# Patient Record
Sex: FEMALE | Race: WHITE | NOT HISPANIC OR LATINO | URBAN - METROPOLITAN AREA
[De-identification: names, ages, dates, MRNs, and addresses within clinical notes are randomized per-mention and may not be internally consistent; named-entity substitution may affect disease eponyms.]

---

## 2018-08-21 NOTE — ASU PATIENT PROFILE, ADULT - PMH
Abnormal serum cholesterol    Acquired cataract    Generalized OA    H/O: HTN (hypertension)    Mildly obese

## 2018-08-22 ENCOUNTER — OUTPATIENT (OUTPATIENT)
Dept: OUTPATIENT SERVICES | Facility: HOSPITAL | Age: 83
LOS: 1 days | Discharge: HOME | End: 2018-08-22

## 2018-08-22 VITALS
HEIGHT: 64 IN | SYSTOLIC BLOOD PRESSURE: 130 MMHG | OXYGEN SATURATION: 96 % | WEIGHT: 207.9 LBS | HEART RATE: 77 BPM | DIASTOLIC BLOOD PRESSURE: 57 MMHG | RESPIRATION RATE: 17 BRPM | TEMPERATURE: 96 F

## 2018-08-22 VITALS
RESPIRATION RATE: 18 BRPM | SYSTOLIC BLOOD PRESSURE: 129 MMHG | DIASTOLIC BLOOD PRESSURE: 56 MMHG | OXYGEN SATURATION: 96 % | HEART RATE: 66 BPM

## 2018-08-22 RX ORDER — ACETAMINOPHEN 500 MG
325 TABLET ORAL ONCE
Qty: 0 | Refills: 0 | Status: DISCONTINUED | OUTPATIENT
Start: 2018-08-22 | End: 2018-09-06

## 2018-08-22 RX ORDER — ONDANSETRON 8 MG/1
4 TABLET, FILM COATED ORAL ONCE
Qty: 0 | Refills: 0 | Status: DISCONTINUED | OUTPATIENT
Start: 2018-08-22 | End: 2018-09-06

## 2018-08-22 NOTE — PRE-ANESTHESIA EVALUATION ADULT - NSANTHOSAYNRD_GEN_A_CORE
No. SUSIE screening performed.  STOP BANG Legend: 0-2 = LOW Risk; 3-4 = INTERMEDIATE Risk; 5-8 = HIGH Risk

## 2018-08-28 DIAGNOSIS — M19.90 UNSPECIFIED OSTEOARTHRITIS, UNSPECIFIED SITE: ICD-10-CM

## 2018-08-28 DIAGNOSIS — I10 ESSENTIAL (PRIMARY) HYPERTENSION: ICD-10-CM

## 2018-08-28 DIAGNOSIS — E66.9 OBESITY, UNSPECIFIED: ICD-10-CM

## 2018-08-28 DIAGNOSIS — H26.9 UNSPECIFIED CATARACT: ICD-10-CM

## 2018-08-28 DIAGNOSIS — Z90.49 ACQUIRED ABSENCE OF OTHER SPECIFIED PARTS OF DIGESTIVE TRACT: ICD-10-CM

## 2018-08-28 DIAGNOSIS — Z80.0 FAMILY HISTORY OF MALIGNANT NEOPLASM OF DIGESTIVE ORGANS: ICD-10-CM

## 2018-08-28 DIAGNOSIS — H25.12 AGE-RELATED NUCLEAR CATARACT, LEFT EYE: ICD-10-CM

## 2019-09-18 PROBLEM — H26.9 UNSPECIFIED CATARACT: Chronic | Status: ACTIVE | Noted: 2018-08-22

## 2019-09-18 PROBLEM — Z86.79 PERSONAL HISTORY OF OTHER DISEASES OF THE CIRCULATORY SYSTEM: Chronic | Status: ACTIVE | Noted: 2018-08-22

## 2019-09-18 PROBLEM — M15.9 POLYOSTEOARTHRITIS, UNSPECIFIED: Chronic | Status: ACTIVE | Noted: 2018-08-22

## 2019-09-18 PROBLEM — E78.9 DISORDER OF LIPOPROTEIN METABOLISM, UNSPECIFIED: Chronic | Status: ACTIVE | Noted: 2018-08-22

## 2019-09-18 PROBLEM — E66.9 OBESITY, UNSPECIFIED: Chronic | Status: ACTIVE | Noted: 2018-08-22

## 2019-09-24 ENCOUNTER — TRANSCRIPTION ENCOUNTER (OUTPATIENT)
Age: 84
End: 2019-09-24

## 2019-09-24 ENCOUNTER — RESULT REVIEW (OUTPATIENT)
Age: 84
End: 2019-09-24

## 2019-09-24 ENCOUNTER — OUTPATIENT (OUTPATIENT)
Dept: OUTPATIENT SERVICES | Facility: HOSPITAL | Age: 84
LOS: 1 days | Discharge: HOME | End: 2019-09-24
Payer: MEDICARE

## 2019-09-24 VITALS — RESPIRATION RATE: 18 BRPM | SYSTOLIC BLOOD PRESSURE: 119 MMHG | HEART RATE: 70 BPM | DIASTOLIC BLOOD PRESSURE: 76 MMHG

## 2019-09-24 VITALS
HEART RATE: 80 BPM | WEIGHT: 164.91 LBS | RESPIRATION RATE: 18 BRPM | HEIGHT: 64 IN | SYSTOLIC BLOOD PRESSURE: 137 MMHG | TEMPERATURE: 98 F | DIASTOLIC BLOOD PRESSURE: 87 MMHG

## 2019-09-24 DIAGNOSIS — Z98.890 OTHER SPECIFIED POSTPROCEDURAL STATES: Chronic | ICD-10-CM

## 2019-09-24 DIAGNOSIS — Z90.49 ACQUIRED ABSENCE OF OTHER SPECIFIED PARTS OF DIGESTIVE TRACT: Chronic | ICD-10-CM

## 2019-09-24 DIAGNOSIS — Z98.49 CATARACT EXTRACTION STATUS, UNSPECIFIED EYE: Chronic | ICD-10-CM

## 2019-09-24 PROCEDURE — 88307 TISSUE EXAM BY PATHOLOGIST: CPT | Mod: 26

## 2019-09-24 PROCEDURE — 88173 CYTOPATH EVAL FNA REPORT: CPT | Mod: 26

## 2019-09-24 RX ORDER — DILTIAZEM HCL 120 MG
1 CAPSULE, EXT RELEASE 24 HR ORAL
Qty: 0 | Refills: 0 | DISCHARGE

## 2019-09-24 RX ORDER — ATORVASTATIN CALCIUM 80 MG/1
1 TABLET, FILM COATED ORAL
Qty: 0 | Refills: 0 | DISCHARGE

## 2019-09-24 RX ORDER — RIVAROXABAN 15 MG-20MG
1 KIT ORAL
Qty: 0 | Refills: 0 | DISCHARGE

## 2019-09-24 NOTE — ASU DISCHARGE PLAN (ADULT/PEDIATRIC) - CALL YOUR DOCTOR IF YOU HAVE ANY OF THE FOLLOWING:
Nausea and vomiting that does not stop/Fever greater than (need to indicate Fahrenheit or Celsius)/Inability to tolerate liquids or foods/Bleeding that does not stop

## 2019-09-24 NOTE — ASU PATIENT PROFILE, ADULT - PMH
Abnormal serum cholesterol    Acquired cataract    Afib    DM (diabetes mellitus)    Generalized OA    H/O: HTN (hypertension)    Mildly obese

## 2019-09-24 NOTE — ASU PATIENT PROFILE, ADULT - PSH
H/O hernia repair  abdomen  History of cataract surgery    History of cholecystectomy    History of colon resection

## 2019-09-25 LAB
NON-GYNECOLOGICAL CYTOLOGY STUDY: SIGNIFICANT CHANGE UP
NON-GYNECOLOGICAL CYTOLOGY STUDY: SIGNIFICANT CHANGE UP

## 2019-09-27 LAB — SURGICAL PATHOLOGY STUDY: SIGNIFICANT CHANGE UP

## 2019-09-30 DIAGNOSIS — K86.9 DISEASE OF PANCREAS, UNSPECIFIED: ICD-10-CM

## 2019-10-08 ENCOUNTER — OUTPATIENT (OUTPATIENT)
Dept: OUTPATIENT SERVICES | Facility: HOSPITAL | Age: 84
LOS: 1 days | Discharge: HOME | End: 2019-10-08
Payer: MEDICARE

## 2019-10-08 ENCOUNTER — TRANSCRIPTION ENCOUNTER (OUTPATIENT)
Age: 84
End: 2019-10-08

## 2019-10-08 ENCOUNTER — RESULT REVIEW (OUTPATIENT)
Age: 84
End: 2019-10-08

## 2019-10-08 VITALS
TEMPERATURE: 96 F | RESPIRATION RATE: 18 BRPM | WEIGHT: 160.06 LBS | HEART RATE: 63 BPM | DIASTOLIC BLOOD PRESSURE: 59 MMHG | SYSTOLIC BLOOD PRESSURE: 110 MMHG | HEIGHT: 63 IN

## 2019-10-08 VITALS — HEART RATE: 64 BPM | SYSTOLIC BLOOD PRESSURE: 112 MMHG | RESPIRATION RATE: 18 BRPM | DIASTOLIC BLOOD PRESSURE: 69 MMHG

## 2019-10-08 DIAGNOSIS — Z98.49 CATARACT EXTRACTION STATUS, UNSPECIFIED EYE: Chronic | ICD-10-CM

## 2019-10-08 DIAGNOSIS — Z90.89 ACQUIRED ABSENCE OF OTHER ORGANS: Chronic | ICD-10-CM

## 2019-10-08 DIAGNOSIS — Z98.890 OTHER SPECIFIED POSTPROCEDURAL STATES: Chronic | ICD-10-CM

## 2019-10-08 DIAGNOSIS — Z90.49 ACQUIRED ABSENCE OF OTHER SPECIFIED PARTS OF DIGESTIVE TRACT: Chronic | ICD-10-CM

## 2019-10-08 PROBLEM — E11.9 TYPE 2 DIABETES MELLITUS WITHOUT COMPLICATIONS: Chronic | Status: ACTIVE | Noted: 2019-09-24

## 2019-10-08 PROBLEM — I48.91 UNSPECIFIED ATRIAL FIBRILLATION: Chronic | Status: ACTIVE | Noted: 2019-09-24

## 2019-10-08 LAB — GLUCOSE BLDC GLUCOMTR-MCNC: 140 MG/DL — HIGH (ref 70–99)

## 2019-10-08 PROCEDURE — 88305 TISSUE EXAM BY PATHOLOGIST: CPT | Mod: 26

## 2019-10-08 NOTE — ASU DISCHARGE PLAN (ADULT/PEDIATRIC) - CALL YOUR DOCTOR IF YOU HAVE ANY OF THE FOLLOWING:
Fever greater than (need to indicate Fahrenheit or Celsius)/Unable to urinate/Inability to tolerate liquids or foods/Wound/Surgical Site with redness, or foul smelling discharge or pus/Numbness, tingling, color or temperature change to extremity/Excessive diarrhea/Nausea and vomiting that does not stop/Pain not relieved by Medications/Increased irritability or sluggishness/Bleeding that does not stop

## 2019-10-08 NOTE — ASU PATIENT PROFILE, ADULT - PMH
Abnormal serum cholesterol    Acquired cataract    Afib    Anxiety    DM (diabetes mellitus)    Generalized OA    H/O: HTN (hypertension)    Hypercholesteremia    Mildly obese

## 2019-10-08 NOTE — ASU PATIENT PROFILE, ADULT - PSH
H/O hernia repair  abdomen  History of cataract surgery    History of cholecystectomy    History of colon resection    History of tonsillectomy

## 2019-10-11 LAB — SURGICAL PATHOLOGY STUDY: SIGNIFICANT CHANGE UP

## 2019-10-15 DIAGNOSIS — I48.91 UNSPECIFIED ATRIAL FIBRILLATION: ICD-10-CM

## 2019-10-15 DIAGNOSIS — E66.9 OBESITY, UNSPECIFIED: ICD-10-CM

## 2019-10-15 DIAGNOSIS — E11.9 TYPE 2 DIABETES MELLITUS WITHOUT COMPLICATIONS: ICD-10-CM

## 2019-10-15 DIAGNOSIS — E78.00 PURE HYPERCHOLESTEROLEMIA, UNSPECIFIED: ICD-10-CM

## 2019-10-15 DIAGNOSIS — Z79.84 LONG TERM (CURRENT) USE OF ORAL HYPOGLYCEMIC DRUGS: ICD-10-CM

## 2019-10-15 DIAGNOSIS — K86.2 CYST OF PANCREAS: ICD-10-CM

## 2019-10-15 DIAGNOSIS — K29.50 UNSPECIFIED CHRONIC GASTRITIS WITHOUT BLEEDING: ICD-10-CM

## 2019-10-17 PROBLEM — E78.00 PURE HYPERCHOLESTEROLEMIA, UNSPECIFIED: Chronic | Status: ACTIVE | Noted: 2019-10-08

## 2019-10-17 PROBLEM — Z00.00 ENCOUNTER FOR PREVENTIVE HEALTH EXAMINATION: Status: ACTIVE | Noted: 2019-10-17

## 2019-10-17 PROBLEM — F41.9 ANXIETY DISORDER, UNSPECIFIED: Chronic | Status: ACTIVE | Noted: 2019-10-08

## 2019-10-20 NOTE — PHYSICAL EXAM
[Normal] : supple, no neck mass and thyroid not enlarged [Normal Neck Lymph Nodes] : normal neck lymph nodes  [Normal Supraclavicular Lymph Nodes] : normal supraclavicular lymph nodes [Normal] : grossly intact

## 2019-10-21 ENCOUNTER — APPOINTMENT (OUTPATIENT)
Dept: SURGERY | Facility: CLINIC | Age: 84
End: 2019-10-21
Payer: MEDICARE

## 2019-10-21 DIAGNOSIS — Z86.59 PERSONAL HISTORY OF OTHER MENTAL AND BEHAVIORAL DISORDERS: ICD-10-CM

## 2019-10-21 DIAGNOSIS — K86.89 OTHER SPECIFIED DISEASES OF PANCREAS: ICD-10-CM

## 2019-10-21 PROCEDURE — 99204 OFFICE O/P NEW MOD 45 MIN: CPT

## 2019-10-22 ENCOUNTER — OUTPATIENT (OUTPATIENT)
Dept: OUTPATIENT SERVICES | Facility: HOSPITAL | Age: 84
LOS: 1 days | Discharge: HOME | End: 2019-10-22
Payer: MEDICARE

## 2019-10-22 DIAGNOSIS — Z90.49 ACQUIRED ABSENCE OF OTHER SPECIFIED PARTS OF DIGESTIVE TRACT: Chronic | ICD-10-CM

## 2019-10-22 DIAGNOSIS — Z98.49 CATARACT EXTRACTION STATUS, UNSPECIFIED EYE: Chronic | ICD-10-CM

## 2019-10-22 DIAGNOSIS — Z98.890 OTHER SPECIFIED POSTPROCEDURAL STATES: Chronic | ICD-10-CM

## 2019-10-22 DIAGNOSIS — K86.89 OTHER SPECIFIED DISEASES OF PANCREAS: ICD-10-CM

## 2019-10-22 DIAGNOSIS — Z90.89 ACQUIRED ABSENCE OF OTHER ORGANS: Chronic | ICD-10-CM

## 2019-10-22 LAB — GLUCOSE BLDC GLUCOMTR-MCNC: 108 MG/DL — HIGH (ref 70–99)

## 2019-10-22 PROCEDURE — 78815 PET IMAGE W/CT SKULL-THIGH: CPT | Mod: 26,PI

## 2019-10-25 ENCOUNTER — OTHER (OUTPATIENT)
Age: 84
End: 2019-10-25

## 2019-10-30 ENCOUNTER — APPOINTMENT (OUTPATIENT)
Dept: GASTROENTEROLOGY | Facility: CLINIC | Age: 84
End: 2019-10-30
Payer: MEDICARE

## 2019-10-30 VITALS
HEART RATE: 90 BPM | WEIGHT: 157 LBS | HEIGHT: 63 IN | SYSTOLIC BLOOD PRESSURE: 129 MMHG | BODY MASS INDEX: 27.82 KG/M2 | DIASTOLIC BLOOD PRESSURE: 80 MMHG

## 2019-10-30 DIAGNOSIS — Z86.39 PERSONAL HISTORY OF OTHER ENDOCRINE, NUTRITIONAL AND METABOLIC DISEASE: ICD-10-CM

## 2019-10-30 DIAGNOSIS — Z86.79 PERSONAL HISTORY OF OTHER DISEASES OF THE CIRCULATORY SYSTEM: ICD-10-CM

## 2019-10-30 DIAGNOSIS — Z87.09 PERSONAL HISTORY OF OTHER DISEASES OF THE RESPIRATORY SYSTEM: ICD-10-CM

## 2019-10-30 DIAGNOSIS — Z85.038 PERSONAL HISTORY OF OTHER MALIGNANT NEOPLASM OF LARGE INTESTINE: ICD-10-CM

## 2019-10-30 DIAGNOSIS — M19.90 UNSPECIFIED OSTEOARTHRITIS, UNSPECIFIED SITE: ICD-10-CM

## 2019-10-30 DIAGNOSIS — Z86.69 PERSONAL HISTORY OF OTHER DISEASES OF THE NERVOUS SYSTEM AND SENSE ORGANS: ICD-10-CM

## 2019-10-30 DIAGNOSIS — R93.5 ABNORMAL FINDINGS ON DIAGNOSTIC IMAGING OF OTHER ABDOMINAL REGIONS, INCLUDING RETROPERITONEUM: ICD-10-CM

## 2019-10-30 DIAGNOSIS — Z86.718 PERSONAL HISTORY OF OTHER VENOUS THROMBOSIS AND EMBOLISM: ICD-10-CM

## 2019-10-30 PROCEDURE — 99204 OFFICE O/P NEW MOD 45 MIN: CPT

## 2019-10-30 RX ORDER — LATANOPROST/PF 0.005 %
DROPS OPHTHALMIC (EYE)
Refills: 0 | Status: ACTIVE | COMMUNITY

## 2019-10-30 RX ORDER — CHOLECALCIFEROL (VITAMIN D3) 25 MCG
TABLET ORAL
Refills: 0 | Status: ACTIVE | COMMUNITY

## 2019-10-30 RX ORDER — MULTIVIT-MIN/FA/LYCOPEN/LUTEIN .4-300-25
TABLET ORAL
Refills: 0 | Status: ACTIVE | COMMUNITY

## 2019-10-30 RX ORDER — HYDROCHLOROTHIAZIDE 12.5 MG/1
12.5 TABLET ORAL
Refills: 0 | Status: ACTIVE | COMMUNITY

## 2019-10-30 RX ORDER — FLUOXETINE HYDROCHLORIDE 20 MG/1
20 TABLET ORAL
Refills: 0 | Status: ACTIVE | COMMUNITY

## 2019-10-30 RX ORDER — ATORVASTATIN CALCIUM 20 MG/1
20 TABLET, FILM COATED ORAL
Refills: 0 | Status: ACTIVE | COMMUNITY

## 2019-10-30 RX ORDER — ACETAMINOPHEN 325 MG/1
TABLET, FILM COATED ORAL
Refills: 0 | Status: ACTIVE | COMMUNITY

## 2019-10-30 RX ORDER — METFORMIN HYDROCHLORIDE 500 MG/1
500 TABLET, COATED ORAL
Refills: 0 | Status: ACTIVE | COMMUNITY

## 2019-10-30 RX ORDER — ASPIRIN 81 MG
81 TABLET, DELAYED RELEASE (ENTERIC COATED) ORAL
Refills: 0 | Status: ACTIVE | COMMUNITY

## 2019-10-30 RX ORDER — DORZOLAMIDE HYDROCHLORIDE 20 MG/ML
2 SOLUTION OPHTHALMIC
Refills: 0 | Status: ACTIVE | COMMUNITY

## 2019-10-30 RX ORDER — FOSINOPRIL SODIUM 40 MG/1
40 TABLET ORAL
Refills: 0 | Status: ACTIVE | COMMUNITY

## 2019-10-30 RX ORDER — REPAGLINIDE 0.5 MG/1
0.5 TABLET ORAL
Refills: 0 | Status: ACTIVE | COMMUNITY

## 2019-10-30 RX ORDER — APIXABAN 5 MG/1
5 TABLET, FILM COATED ORAL
Refills: 0 | Status: ACTIVE | COMMUNITY

## 2019-10-30 NOTE — PHYSICAL EXAM
[General Appearance - Alert] : alert [General Appearance - In No Acute Distress] : in no acute distress [Sclera] : the sclera and conjunctiva were normal [PERRL With Normal Accommodation] : pupils were equal in size, round, and reactive to light [Extraocular Movements] : extraocular movements were intact [Outer Ear] : the ears and nose were normal in appearance [Oropharynx] : the oropharynx was normal [Neck Appearance] : the appearance of the neck was normal [Neck Cervical Mass (___cm)] : no neck mass was observed [Jugular Venous Distention Increased] : there was no jugular-venous distention [Thyroid Diffuse Enlargement] : the thyroid was not enlarged [Thyroid Nodule] : there were no palpable thyroid nodules [Auscultation Breath Sounds / Voice Sounds] : lungs were clear to auscultation bilaterally [Heart Rate And Rhythm] : heart rate was normal and rhythm regular [Heart Sounds] : normal S1 and S2 [Heart Sounds Gallop] : no gallops [Murmurs] : no murmurs [Heart Sounds Pericardial Friction Rub] : no pericardial rub [Bowel Sounds] : normal bowel sounds [Abdomen Soft] : soft [Abdomen Tenderness] : non-tender [Abdomen Mass (___ Cm)] : no abdominal mass palpated [Involuntary Movements] : no involuntary movements were seen [] : no rash [No Focal Deficits] : no focal deficits [Affect] : the affect was normal [Mood] : the mood was normal

## 2019-10-31 NOTE — ASSESSMENT
[FreeTextEntry1] : Patient is an 84-year-old female with past medical history of hypertension, atrial fibrillation, prior blood clots, referred from surgical oncology for evaluation of a pancreatic head mass.  Patient underwent to recent endoscopic ultrasounds one with FNA and the other with FNB with no yield for pathology.  Patient requires tissue sampling in order to establish care regimen.  She without any signs of obstruction such as nausea, vomiting, abdominal pain, jaundice, or upper GI symptoms.  Patient present with son whom was also present during last endoscopic ultrasound as well as evaluation by surgical oncology.  Concern exists as procedure is being requested quite rapidly.  Patient is actively on anticoagulation as well as antiplatelet agent.\par \par Prior EUS with FNA and FNB x 2 - no yield \par - Hold Eliquis one week\par - Hold ASA one week \par - Will arrange for EUS with sampling \par - Risk and benefit of procedure explained to patient

## 2019-11-02 PROBLEM — Z86.59 HISTORY OF DEPRESSION: Status: RESOLVED | Noted: 2019-11-02 | Resolved: 2019-11-02

## 2019-11-02 NOTE — HISTORY OF PRESENT ILLNESS
[de-identified] : 83 yo female patient with multiple medical problems, underwent EUS / FNA of a 4 x 5 cm mass in the head of the pancreas with benign results. She comes today for evaluation and treatment recommendations.\par \par Imaging with a large mass in he head of the pancreas encasing the PV. Multiple biopsies negative. PET positive lesion. Denies any other symptoms.

## 2019-11-02 NOTE — ASSESSMENT
[FreeTextEntry1] : 85 yo female patient with multiple medical problems, underwent EUS / FNA of a 4 x 5 cm mass in the head of the pancreas with benign results. She comes today for evaluation and treatment recommendations.\par \par Imaging with a large mass in he head of the pancreas encasing the PV. Multiple biopsies negative. PET positive lesion. Denies any other symptoms.\par \par Assessment and Plan:\par \par Pancreas mass, head, biopsies by EUS negative. encasing PV.\par Referred to Dr. Yeboah for EUS and FNA.\par D/w case with Dr. Vela.\par D/w Daughter.\par \par No surgical intervention offered.\par \par All the questions were answered to their satisfaction and I encouraged the patient to call my office at anytime if they had any questions. Plan of care fully discussed with the patient.

## 2019-11-02 NOTE — REASON FOR VISIT
[Initial Consultation] : an initial consultation for [Family Member] : family member [FreeTextEntry2] : pancreas mass, head.

## 2019-12-18 ENCOUNTER — INPATIENT (INPATIENT)
Facility: HOSPITAL | Age: 84
LOS: 8 days | Discharge: NURSING, REHAB OR LONG-TERM CARE | End: 2019-12-27
Attending: INTERNAL MEDICINE | Admitting: INTERNAL MEDICINE
Payer: MEDICARE

## 2019-12-18 VITALS
HEART RATE: 117 BPM | DIASTOLIC BLOOD PRESSURE: 92 MMHG | RESPIRATION RATE: 18 BRPM | TEMPERATURE: 98 F | OXYGEN SATURATION: 99 % | SYSTOLIC BLOOD PRESSURE: 127 MMHG

## 2019-12-18 DIAGNOSIS — Z98.890 OTHER SPECIFIED POSTPROCEDURAL STATES: Chronic | ICD-10-CM

## 2019-12-18 DIAGNOSIS — Z90.49 ACQUIRED ABSENCE OF OTHER SPECIFIED PARTS OF DIGESTIVE TRACT: Chronic | ICD-10-CM

## 2019-12-18 DIAGNOSIS — Z98.49 CATARACT EXTRACTION STATUS, UNSPECIFIED EYE: Chronic | ICD-10-CM

## 2019-12-18 DIAGNOSIS — Z90.89 ACQUIRED ABSENCE OF OTHER ORGANS: Chronic | ICD-10-CM

## 2019-12-18 LAB
ALBUMIN SERPL ELPH-MCNC: 3.2 G/DL — LOW (ref 3.5–5.2)
ALP SERPL-CCNC: 1416 U/L — HIGH (ref 30–115)
ALT FLD-CCNC: 213 U/L — HIGH (ref 0–41)
ANION GAP SERPL CALC-SCNC: 14 MMOL/L — SIGNIFICANT CHANGE UP (ref 7–14)
APTT BLD: 25.6 SEC — LOW (ref 27–39.2)
AST SERPL-CCNC: 500 U/L — HIGH (ref 0–41)
BASOPHILS # BLD AUTO: 0 K/UL — SIGNIFICANT CHANGE UP (ref 0–0.2)
BASOPHILS NFR BLD AUTO: 0 % — SIGNIFICANT CHANGE UP (ref 0–1)
BILIRUB DIRECT SERPL-MCNC: 5.4 MG/DL — HIGH (ref 0–0.2)
BILIRUB INDIRECT FLD-MCNC: 0.5 MG/DL — SIGNIFICANT CHANGE UP (ref 0.2–1.2)
BILIRUB SERPL-MCNC: 5.9 MG/DL — HIGH (ref 0.2–1.2)
BUN SERPL-MCNC: 15 MG/DL — SIGNIFICANT CHANGE UP (ref 10–20)
CALCIUM SERPL-MCNC: 9 MG/DL — SIGNIFICANT CHANGE UP (ref 8.5–10.1)
CHLORIDE SERPL-SCNC: 97 MMOL/L — LOW (ref 98–110)
CO2 SERPL-SCNC: 25 MMOL/L — SIGNIFICANT CHANGE UP (ref 17–32)
CREAT SERPL-MCNC: <0.5 MG/DL — LOW (ref 0.7–1.5)
EOSINOPHIL # BLD AUTO: 0 K/UL — SIGNIFICANT CHANGE UP (ref 0–0.7)
EOSINOPHIL NFR BLD AUTO: 0 % — SIGNIFICANT CHANGE UP (ref 0–8)
GLUCOSE SERPL-MCNC: 192 MG/DL — HIGH (ref 70–99)
HCT VFR BLD CALC: 36.7 % — LOW (ref 37–47)
HGB BLD-MCNC: 12.9 G/DL — SIGNIFICANT CHANGE UP (ref 12–16)
IMM GRANULOCYTES NFR BLD AUTO: 0.4 % — HIGH (ref 0.1–0.3)
INR BLD: 1.27 RATIO — SIGNIFICANT CHANGE UP (ref 0.65–1.3)
LACTATE SERPL-SCNC: 1.5 MMOL/L — SIGNIFICANT CHANGE UP (ref 0.7–2)
LIDOCAIN IGE QN: 6 U/L — LOW (ref 7–60)
LYMPHOCYTES # BLD AUTO: 0.68 K/UL — LOW (ref 1.2–3.4)
LYMPHOCYTES # BLD AUTO: 24.1 % — SIGNIFICANT CHANGE UP (ref 20.5–51.1)
MAGNESIUM SERPL-MCNC: 1.8 MG/DL — SIGNIFICANT CHANGE UP (ref 1.8–2.4)
MCHC RBC-ENTMCNC: 33.2 PG — HIGH (ref 27–31)
MCHC RBC-ENTMCNC: 35.1 G/DL — SIGNIFICANT CHANGE UP (ref 32–37)
MCV RBC AUTO: 94.3 FL — SIGNIFICANT CHANGE UP (ref 81–99)
MONOCYTES # BLD AUTO: 0.04 K/UL — LOW (ref 0.1–0.6)
MONOCYTES NFR BLD AUTO: 1.4 % — LOW (ref 1.7–9.3)
NEUTROPHILS # BLD AUTO: 2.09 K/UL — SIGNIFICANT CHANGE UP (ref 1.4–6.5)
NEUTROPHILS NFR BLD AUTO: 74.1 % — SIGNIFICANT CHANGE UP (ref 42.2–75.2)
NRBC # BLD: 0 /100 WBCS — SIGNIFICANT CHANGE UP (ref 0–0)
PLATELET # BLD AUTO: 221 K/UL — SIGNIFICANT CHANGE UP (ref 130–400)
POTASSIUM SERPL-MCNC: 4 MMOL/L — SIGNIFICANT CHANGE UP (ref 3.5–5)
POTASSIUM SERPL-SCNC: 4 MMOL/L — SIGNIFICANT CHANGE UP (ref 3.5–5)
PROT SERPL-MCNC: 5.7 G/DL — LOW (ref 6–8)
PROTHROM AB SERPL-ACNC: 14.6 SEC — HIGH (ref 9.95–12.87)
RBC # BLD: 3.89 M/UL — LOW (ref 4.2–5.4)
RBC # FLD: 15 % — HIGH (ref 11.5–14.5)
SODIUM SERPL-SCNC: 136 MMOL/L — SIGNIFICANT CHANGE UP (ref 135–146)
TROPONIN T SERPL-MCNC: <0.01 NG/ML — SIGNIFICANT CHANGE UP
WBC # BLD: 2.82 K/UL — LOW (ref 4.8–10.8)
WBC # FLD AUTO: 2.82 K/UL — LOW (ref 4.8–10.8)

## 2019-12-18 PROCEDURE — 93010 ELECTROCARDIOGRAM REPORT: CPT

## 2019-12-18 PROCEDURE — 99285 EMERGENCY DEPT VISIT HI MDM: CPT

## 2019-12-18 PROCEDURE — 71045 X-RAY EXAM CHEST 1 VIEW: CPT | Mod: 26

## 2019-12-18 PROCEDURE — 74177 CT ABD & PELVIS W/CONTRAST: CPT | Mod: 26

## 2019-12-18 RX ORDER — SODIUM CHLORIDE 9 MG/ML
1000 INJECTION INTRAMUSCULAR; INTRAVENOUS; SUBCUTANEOUS ONCE
Refills: 0 | Status: COMPLETED | OUTPATIENT
Start: 2019-12-18 | End: 2019-12-18

## 2019-12-18 RX ORDER — HYDROMORPHONE HYDROCHLORIDE 2 MG/ML
0.5 INJECTION INTRAMUSCULAR; INTRAVENOUS; SUBCUTANEOUS ONCE
Refills: 0 | Status: DISCONTINUED | OUTPATIENT
Start: 2019-12-18 | End: 2019-12-18

## 2019-12-18 RX ORDER — DILTIAZEM HCL 120 MG
60 CAPSULE, EXT RELEASE 24 HR ORAL ONCE
Refills: 0 | Status: COMPLETED | OUTPATIENT
Start: 2019-12-18 | End: 2019-12-18

## 2019-12-18 RX ORDER — MORPHINE SULFATE 50 MG/1
4 CAPSULE, EXTENDED RELEASE ORAL ONCE
Refills: 0 | Status: DISCONTINUED | OUTPATIENT
Start: 2019-12-18 | End: 2019-12-18

## 2019-12-18 RX ORDER — IOHEXOL 300 MG/ML
30 INJECTION, SOLUTION INTRAVENOUS ONCE
Refills: 0 | Status: COMPLETED | OUTPATIENT
Start: 2019-12-18 | End: 2019-12-18

## 2019-12-18 RX ORDER — PIPERACILLIN AND TAZOBACTAM 4; .5 G/20ML; G/20ML
3.38 INJECTION, POWDER, LYOPHILIZED, FOR SOLUTION INTRAVENOUS ONCE
Refills: 0 | Status: COMPLETED | OUTPATIENT
Start: 2019-12-18 | End: 2019-12-18

## 2019-12-18 RX ORDER — DILTIAZEM HCL 120 MG
10 CAPSULE, EXT RELEASE 24 HR ORAL ONCE
Refills: 0 | Status: COMPLETED | OUTPATIENT
Start: 2019-12-18 | End: 2019-12-18

## 2019-12-18 RX ORDER — ONDANSETRON 8 MG/1
4 TABLET, FILM COATED ORAL ONCE
Refills: 0 | Status: COMPLETED | OUTPATIENT
Start: 2019-12-18 | End: 2019-12-18

## 2019-12-18 RX ADMIN — HYDROMORPHONE HYDROCHLORIDE 0.5 MILLIGRAM(S): 2 INJECTION INTRAMUSCULAR; INTRAVENOUS; SUBCUTANEOUS at 21:36

## 2019-12-18 RX ADMIN — IOHEXOL 30 MILLILITER(S): 300 INJECTION, SOLUTION INTRAVENOUS at 16:00

## 2019-12-18 RX ADMIN — Medication 10 MILLIGRAM(S): at 21:37

## 2019-12-18 RX ADMIN — ONDANSETRON 4 MILLIGRAM(S): 8 TABLET, FILM COATED ORAL at 16:00

## 2019-12-18 RX ADMIN — PIPERACILLIN AND TAZOBACTAM 200 GRAM(S): 4; .5 INJECTION, POWDER, LYOPHILIZED, FOR SOLUTION INTRAVENOUS at 21:36

## 2019-12-18 RX ADMIN — Medication 60 MILLIGRAM(S): at 23:49

## 2019-12-18 RX ADMIN — MORPHINE SULFATE 4 MILLIGRAM(S): 50 CAPSULE, EXTENDED RELEASE ORAL at 16:00

## 2019-12-18 RX ADMIN — SODIUM CHLORIDE 1000 MILLILITER(S): 9 INJECTION INTRAMUSCULAR; INTRAVENOUS; SUBCUTANEOUS at 16:00

## 2019-12-18 NOTE — ED ADULT TRIAGE NOTE - CHIEF COMPLAINT QUOTE
Pt c/o abd pain. Pt diagnosed with pancreatic ca and started chemo on 12/12. Pt sent in by onc for abd pain and elevated liver enzymes.

## 2019-12-18 NOTE — ED PROVIDER NOTE - PHYSICAL EXAMINATION
CONST: Well appearing in NAD  EYES: Sclera and conjunctiva clear.  CARD: + tachycardic, irregularly irregular.   RESP: Equal BS B/L, No wheezes, rhonchi or rales. No distress  GI: Soft, + diffuse abd pain, no rebound or guarding.  non-distended.  MS: Normal ROM in all extremities. No edema of lower extremities, no calf pain, radial pulses 2+ bilaterally  SKIN: Warm, dry, no acute rashes. Good turgor  NEURO: A&Ox3, No focal deficits. Strength 5/5 with no sensory deficits. Steady gait

## 2019-12-18 NOTE — ED ADULT NURSE NOTE - OBJECTIVE STATEMENT
went to pmd for f/u blood work has pancreas cancer, states has abdomen pain, daughter states blood work called and said liver enzymes elevated to come to hospital for stent. pt denies v/f/d/chills. states decrease in po intake appetite

## 2019-12-18 NOTE — ED PROVIDER NOTE - CARE PLAN
Principal Discharge DX:	Transaminitis  Secondary Diagnosis:	Intractable abdominal pain  Secondary Diagnosis:	Atrial fibrillation with RVR Principal Discharge DX:	Transaminitis  Secondary Diagnosis:	Intractable abdominal pain  Secondary Diagnosis:	Atrial fibrillation with RVR  Secondary Diagnosis:	Pancreatic cancer

## 2019-12-18 NOTE — ED ADULT NURSE NOTE - PMH
Abnormal serum cholesterol    Acquired cataract    Afib    Anxiety    DM (diabetes mellitus)    Generalized OA    H/O: HTN (hypertension)    Hypercholesteremia    Mildly obese    Pancreatic cancer

## 2019-12-18 NOTE — ED PROVIDER NOTE - OBJECTIVE STATEMENT
84 y.o female w/ hx of MDD, colon CA s/p resection, pancreatic mass presents to the ED for evaluation of x 3 days.  States she was recently dx with pancreatic ca and started on chemo with last tx On Friday.  Over past 3 days worsening diffuse abd pain, moderate severity, exacerbated with PO intake, no alleviating factors, no radiation of pain.  Admits to nausea and NBNB emesis.  Denies diarrhea, urinary sxs, chest pain, dyspnea, fever, vaginal d/c or bleeding.  had blood work which showed elevated LFTs prompting visit to the ED.

## 2019-12-18 NOTE — ED PROVIDER NOTE - PROGRESS NOTE DETAILS
discussed case with BETY Dubon.  recommends NPO, zosyn, blood cx, IVF.  will see in the morning. On monitor, afib 120's-140's.  pt has h/o afib, is on cardizem.  will give iv dose. repeat 's after cardizem.

## 2019-12-18 NOTE — ED PROVIDER NOTE - ATTENDING CONTRIBUTION TO CARE
85 y/o female with /o HTN, DM, afib on eliquis, recently diagnosed with pancreatic CA, received 1st chemo 5 days ago, sent to ER by her onc, Dr. Vela for increased LFT's, possible biliary obstruction, possible need for stent placement.  Pt states she's been having abd pain for > 1 month 83 y/o female with /o HTN, DM, afib on eliquis, recently diagnosed with pancreatic CA, received 1st chemo 5 days ago, sent to ER by her onc, Dr. Vela for increased LFT's, possible biliary obstruction, possible need for stent placement.  Pt states she's been having abd pain for > 1 month, getting worse.  +N, vomited yesterday.  no diarrhea.  + generalized weakness.  + decreased PO intake. no f/c.  no cp/sob.  no ha/dizziness/loc. had h/o appt with onc today and labs showed increased LFT's.  PE - nad, nc, eomi, perrl, op - clear, mmm, cta b/l, no w/r/r, irreg rhythm, 's, abd - soft, + diffuse tenderness, no guarding/rebound, nabs, from x 4, A&O x 3, no focal neuro deficits.  -check labs, ct abd, admit.

## 2019-12-18 NOTE — ED PROVIDER NOTE - NS ED ROS FT
Constitutional: See HPI.  Eyes: No visual changes, eye pain or discharge.   ENMT: No hearing changes, pain, discharge or infections.   Cardiac: No SOB or edema. No chest pain with exertion.  Respiratory: No cough or respiratory distress.   GI: +N/V, + abd pain. No diarrhea  : No dysuria, frequency or burning. No Discharge  MS: No myalgia, muscle weakness, joint pain or back pain.  Neuro: No headache or weakness.   Skin: No skin rash.  Except as documented in the HPI, all other systems are negative.

## 2019-12-18 NOTE — ED PROVIDER NOTE - CLINICAL SUMMARY MEDICAL DECISION MAKING FREE TEXT BOX
pt with recently diagnosed pancreatic CA, just started on chemo in ER with 1 month worsening abd pain, now with elevated LFT's and bili.  CT scan - increased size of pancreatic head mass, + biliary dilation.  pt given ivf, iv abx, case d/w gi to eval for stent placement.  pt with h/o afib, 's-140's, given dose of iv cardizem - repeat 's on monitor.  pt admitted for continued evaluation and management.

## 2019-12-19 LAB
ALBUMIN SERPL ELPH-MCNC: 3.1 G/DL — LOW (ref 3.5–5.2)
ALP SERPL-CCNC: 1400 U/L — HIGH (ref 30–115)
ALT FLD-CCNC: 205 U/L — HIGH (ref 0–41)
ANION GAP SERPL CALC-SCNC: 16 MMOL/L — HIGH (ref 7–14)
APTT BLD: 26.3 SEC — LOW (ref 27–39.2)
APTT BLD: 37.1 SEC — SIGNIFICANT CHANGE UP (ref 27–39.2)
APTT BLD: 50.6 SEC — HIGH (ref 27–39.2)
AST SERPL-CCNC: 403 U/L — HIGH (ref 0–41)
BILIRUB SERPL-MCNC: 5.4 MG/DL — HIGH (ref 0.2–1.2)
BUN SERPL-MCNC: 12 MG/DL — SIGNIFICANT CHANGE UP (ref 10–20)
CALCIUM SERPL-MCNC: 8.5 MG/DL — SIGNIFICANT CHANGE UP (ref 8.5–10.1)
CHLORIDE SERPL-SCNC: 98 MMOL/L — SIGNIFICANT CHANGE UP (ref 98–110)
CO2 SERPL-SCNC: 22 MMOL/L — SIGNIFICANT CHANGE UP (ref 17–32)
CREAT SERPL-MCNC: <0.5 MG/DL — LOW (ref 0.7–1.5)
GLUCOSE BLDC GLUCOMTR-MCNC: 150 MG/DL — HIGH (ref 70–99)
GLUCOSE BLDC GLUCOMTR-MCNC: 156 MG/DL — HIGH (ref 70–99)
GLUCOSE BLDC GLUCOMTR-MCNC: 164 MG/DL — HIGH (ref 70–99)
GLUCOSE SERPL-MCNC: 192 MG/DL — HIGH (ref 70–99)
HCT VFR BLD CALC: 33.4 % — LOW (ref 37–47)
HGB BLD-MCNC: 11.7 G/DL — LOW (ref 12–16)
INR BLD: 1.31 RATIO — HIGH (ref 0.65–1.3)
MAGNESIUM SERPL-MCNC: 1.8 MG/DL — SIGNIFICANT CHANGE UP (ref 1.8–2.4)
MCHC RBC-ENTMCNC: 32.8 PG — HIGH (ref 27–31)
MCHC RBC-ENTMCNC: 35 G/DL — SIGNIFICANT CHANGE UP (ref 32–37)
MCV RBC AUTO: 93.6 FL — SIGNIFICANT CHANGE UP (ref 81–99)
NRBC # BLD: 0 /100 WBCS — SIGNIFICANT CHANGE UP (ref 0–0)
PLATELET # BLD AUTO: 215 K/UL — SIGNIFICANT CHANGE UP (ref 130–400)
POTASSIUM SERPL-MCNC: 3.9 MMOL/L — SIGNIFICANT CHANGE UP (ref 3.5–5)
POTASSIUM SERPL-SCNC: 3.9 MMOL/L — SIGNIFICANT CHANGE UP (ref 3.5–5)
PROT SERPL-MCNC: 5.2 G/DL — LOW (ref 6–8)
PROTHROM AB SERPL-ACNC: 15 SEC — HIGH (ref 9.95–12.87)
RBC # BLD: 3.57 M/UL — LOW (ref 4.2–5.4)
RBC # FLD: 15.2 % — HIGH (ref 11.5–14.5)
SODIUM SERPL-SCNC: 136 MMOL/L — SIGNIFICANT CHANGE UP (ref 135–146)
WBC # BLD: 3.95 K/UL — LOW (ref 4.8–10.8)
WBC # FLD AUTO: 3.95 K/UL — LOW (ref 4.8–10.8)

## 2019-12-19 PROCEDURE — 99221 1ST HOSP IP/OBS SF/LOW 40: CPT

## 2019-12-19 PROCEDURE — 99283 EMERGENCY DEPT VISIT LOW MDM: CPT

## 2019-12-19 RX ORDER — APIXABAN 2.5 MG/1
1 TABLET, FILM COATED ORAL
Qty: 0 | Refills: 0 | DISCHARGE

## 2019-12-19 RX ORDER — METFORMIN HYDROCHLORIDE 850 MG/1
1 TABLET ORAL
Qty: 0 | Refills: 0 | DISCHARGE

## 2019-12-19 RX ORDER — DILTIAZEM HCL 120 MG
120 CAPSULE, EXT RELEASE 24 HR ORAL THREE TIMES A DAY
Refills: 0 | Status: DISCONTINUED | OUTPATIENT
Start: 2019-12-19 | End: 2019-12-27

## 2019-12-19 RX ORDER — FLUTICASONE PROPIONATE 220 MCG
2 AEROSOL WITH ADAPTER (GRAM) INHALATION
Qty: 0 | Refills: 0 | DISCHARGE

## 2019-12-19 RX ORDER — PIPERACILLIN AND TAZOBACTAM 4; .5 G/20ML; G/20ML
3.38 INJECTION, POWDER, LYOPHILIZED, FOR SOLUTION INTRAVENOUS EVERY 8 HOURS
Refills: 0 | Status: DISCONTINUED | OUTPATIENT
Start: 2019-12-19 | End: 2019-12-23

## 2019-12-19 RX ORDER — REPAGLINIDE 1 MG/1
1 TABLET ORAL
Qty: 0 | Refills: 0 | DISCHARGE

## 2019-12-19 RX ORDER — SODIUM CHLORIDE 9 MG/ML
1000 INJECTION, SOLUTION INTRAVENOUS
Refills: 0 | Status: DISCONTINUED | OUTPATIENT
Start: 2019-12-19 | End: 2019-12-23

## 2019-12-19 RX ORDER — MORPHINE SULFATE 50 MG/1
2 CAPSULE, EXTENDED RELEASE ORAL EVERY 6 HOURS
Refills: 0 | Status: DISCONTINUED | OUTPATIENT
Start: 2019-12-19 | End: 2019-12-26

## 2019-12-19 RX ORDER — FOSINOPRIL SODIUM 10 MG/1
1 TABLET ORAL
Qty: 0 | Refills: 0 | DISCHARGE

## 2019-12-19 RX ORDER — DORZOLAMIDE HYDROCHLORIDE 20 MG/ML
1 SOLUTION/ DROPS OPHTHALMIC THREE TIMES A DAY
Refills: 0 | Status: DISCONTINUED | OUTPATIENT
Start: 2019-12-19 | End: 2019-12-27

## 2019-12-19 RX ORDER — LATANOPROST 0.05 MG/ML
1 SOLUTION/ DROPS OPHTHALMIC; TOPICAL
Qty: 0 | Refills: 0 | DISCHARGE

## 2019-12-19 RX ORDER — LATANOPROST 0.05 MG/ML
1 SOLUTION/ DROPS OPHTHALMIC; TOPICAL AT BEDTIME
Refills: 0 | Status: DISCONTINUED | OUTPATIENT
Start: 2019-12-19 | End: 2019-12-27

## 2019-12-19 RX ORDER — HEPARIN SODIUM 5000 [USP'U]/ML
900 INJECTION INTRAVENOUS; SUBCUTANEOUS
Qty: 25000 | Refills: 0 | Status: DISCONTINUED | OUTPATIENT
Start: 2019-12-19 | End: 2019-12-20

## 2019-12-19 RX ORDER — ATORVASTATIN CALCIUM 80 MG/1
20 TABLET, FILM COATED ORAL AT BEDTIME
Refills: 0 | Status: DISCONTINUED | OUTPATIENT
Start: 2019-12-19 | End: 2019-12-27

## 2019-12-19 RX ORDER — SODIUM CHLORIDE 9 MG/ML
1000 INJECTION INTRAMUSCULAR; INTRAVENOUS; SUBCUTANEOUS
Refills: 0 | Status: DISCONTINUED | OUTPATIENT
Start: 2019-12-19 | End: 2019-12-19

## 2019-12-19 RX ORDER — BRIMONIDINE TARTRATE 2 MG/MG
1 SOLUTION/ DROPS OPHTHALMIC
Qty: 0 | Refills: 0 | DISCHARGE

## 2019-12-19 RX ORDER — ACETAMINOPHEN 500 MG
1 TABLET ORAL
Qty: 0 | Refills: 0 | DISCHARGE

## 2019-12-19 RX ORDER — DILTIAZEM HCL 120 MG
1 CAPSULE, EXT RELEASE 24 HR ORAL
Qty: 0 | Refills: 0 | DISCHARGE

## 2019-12-19 RX ORDER — FLUOXETINE HCL 10 MG
1 CAPSULE ORAL
Qty: 0 | Refills: 0 | DISCHARGE

## 2019-12-19 RX ORDER — FLUOXETINE HCL 10 MG
20 CAPSULE ORAL DAILY
Refills: 0 | Status: DISCONTINUED | OUTPATIENT
Start: 2019-12-19 | End: 2019-12-27

## 2019-12-19 RX ORDER — LISINOPRIL 2.5 MG/1
40 TABLET ORAL DAILY
Refills: 0 | Status: DISCONTINUED | OUTPATIENT
Start: 2019-12-19 | End: 2019-12-27

## 2019-12-19 RX ORDER — APIXABAN 2.5 MG/1
5 TABLET, FILM COATED ORAL
Refills: 0 | Status: DISCONTINUED | OUTPATIENT
Start: 2019-12-19 | End: 2019-12-19

## 2019-12-19 RX ORDER — SENNA PLUS 8.6 MG/1
2 TABLET ORAL DAILY
Refills: 0 | Status: DISCONTINUED | OUTPATIENT
Start: 2019-12-19 | End: 2019-12-27

## 2019-12-19 RX ORDER — ATORVASTATIN CALCIUM 80 MG/1
1 TABLET, FILM COATED ORAL
Qty: 0 | Refills: 0 | DISCHARGE

## 2019-12-19 RX ORDER — POLYETHYLENE GLYCOL 3350 17 G/17G
17 POWDER, FOR SOLUTION ORAL ONCE
Refills: 0 | Status: COMPLETED | OUTPATIENT
Start: 2019-12-19 | End: 2019-12-19

## 2019-12-19 RX ORDER — DORZOLAMIDE HYDROCHLORIDE 20 MG/ML
1 SOLUTION/ DROPS OPHTHALMIC
Qty: 0 | Refills: 0 | DISCHARGE

## 2019-12-19 RX ADMIN — HYDROMORPHONE HYDROCHLORIDE 0.5 MILLIGRAM(S): 2 INJECTION INTRAMUSCULAR; INTRAVENOUS; SUBCUTANEOUS at 02:18

## 2019-12-19 RX ADMIN — HEPARIN SODIUM 11 UNIT(S)/HR: 5000 INJECTION INTRAVENOUS; SUBCUTANEOUS at 19:57

## 2019-12-19 RX ADMIN — MORPHINE SULFATE 2 MILLIGRAM(S): 50 CAPSULE, EXTENDED RELEASE ORAL at 17:49

## 2019-12-19 RX ADMIN — Medication 20 MILLIGRAM(S): at 12:53

## 2019-12-19 RX ADMIN — PIPERACILLIN AND TAZOBACTAM 25 GRAM(S): 4; .5 INJECTION, POWDER, LYOPHILIZED, FOR SOLUTION INTRAVENOUS at 05:22

## 2019-12-19 RX ADMIN — Medication 120 MILLIGRAM(S): at 05:25

## 2019-12-19 RX ADMIN — LATANOPROST 1 DROP(S): 0.05 SOLUTION/ DROPS OPHTHALMIC; TOPICAL at 22:19

## 2019-12-19 RX ADMIN — ATORVASTATIN CALCIUM 20 MILLIGRAM(S): 80 TABLET, FILM COATED ORAL at 22:17

## 2019-12-19 RX ADMIN — PIPERACILLIN AND TAZOBACTAM 25 GRAM(S): 4; .5 INJECTION, POWDER, LYOPHILIZED, FOR SOLUTION INTRAVENOUS at 15:08

## 2019-12-19 RX ADMIN — HEPARIN SODIUM 9 UNIT(S)/HR: 5000 INJECTION INTRAVENOUS; SUBCUTANEOUS at 05:23

## 2019-12-19 RX ADMIN — Medication 120 MILLIGRAM(S): at 22:18

## 2019-12-19 RX ADMIN — MORPHINE SULFATE 2 MILLIGRAM(S): 50 CAPSULE, EXTENDED RELEASE ORAL at 22:18

## 2019-12-19 RX ADMIN — DORZOLAMIDE HYDROCHLORIDE 1 DROP(S): 20 SOLUTION/ DROPS OPHTHALMIC at 16:40

## 2019-12-19 RX ADMIN — SODIUM CHLORIDE 100 MILLILITER(S): 9 INJECTION, SOLUTION INTRAVENOUS at 18:00

## 2019-12-19 RX ADMIN — DORZOLAMIDE HYDROCHLORIDE 1 DROP(S): 20 SOLUTION/ DROPS OPHTHALMIC at 05:23

## 2019-12-19 RX ADMIN — MORPHINE SULFATE 2 MILLIGRAM(S): 50 CAPSULE, EXTENDED RELEASE ORAL at 11:19

## 2019-12-19 RX ADMIN — LISINOPRIL 40 MILLIGRAM(S): 2.5 TABLET ORAL at 05:22

## 2019-12-19 RX ADMIN — PIPERACILLIN AND TAZOBACTAM 25 GRAM(S): 4; .5 INJECTION, POWDER, LYOPHILIZED, FOR SOLUTION INTRAVENOUS at 22:18

## 2019-12-19 RX ADMIN — Medication 120 MILLIGRAM(S): at 15:08

## 2019-12-19 RX ADMIN — DORZOLAMIDE HYDROCHLORIDE 1 DROP(S): 20 SOLUTION/ DROPS OPHTHALMIC at 22:18

## 2019-12-19 RX ADMIN — SODIUM CHLORIDE 75 MILLILITER(S): 9 INJECTION INTRAMUSCULAR; INTRAVENOUS; SUBCUTANEOUS at 02:31

## 2019-12-19 RX ADMIN — Medication 10 MILLIGRAM(S): at 11:19

## 2019-12-19 RX ADMIN — HEPARIN SODIUM 9 UNIT(S)/HR: 5000 INJECTION INTRAVENOUS; SUBCUTANEOUS at 02:29

## 2019-12-19 NOTE — H&P ADULT - HISTORY OF PRESENT ILLNESS
Patient is an 85 y/o F with PMH of Pancreatic cancer diagnosed last month on chemotherapy gemcitabine and abraxane s/p 1st dose on 12/12/19, A fib on eliquis, DM, hypertension, DLD, colon ca s/p resection presented to the hospital with abdominal pain for 1 week. Patient has been having crampy epigastric pain radiating to the back, 8/10 in intensity, constant, progressively worsening for 1 week. Also had multiple episodes of bilious vomiting, associated with decreased PO intake. C/o chills but no subjective fevers at home. Patient noticed 40lbs weight loss in the last 11 months. She was being worked up for pancreatic mass since May 2019 with 2-3 negative biopsy results but recent biopsy came back positive for pancreatic cancer. She was started on chemo, first dose received last Thursday by . She went to see  in her office today as the pain was worsening, she had blood work that showed elevated LFTs so she was sent to the hospital. Patient has family history of pancreatic cancer, in niece. Patient was a smoker for 30yrs, used to smoke 1/2pc/day but stopped 20yrs ago.  In ED, /92mm Hg, /min, 97.9F. LFTs showed , , Alkaline phosphatase 1416, total bilirubin 5.9, lipase 6. CT abdomen w and w/o contrast showed no evidence of intestinal obstruction, mrked intrahepatic bile duct dilatation and dilatation of the common hepatic duct down to the level of the pancreatic mass. Patient was in A fib with RVR, s/p 10mg cardizem IV and 60mg PO cardizem.

## 2019-12-19 NOTE — CONSULT NOTE ADULT - ASSESSMENT
sent to ER  BY dr recinos   for jaundica and abd pain  need ct scan abdomen to r/o obstruction   will follow     mikey simon MD   [phone]
ASSESSMENT  84y F admitted with TRANSAMINITIS;INTRACTABLE ABDOMINAL PAIN;ATRIAL FIBRILLATION WITH RVR    HPI:  Patient is an 83 y/o F with PMH of Pancreatic cancer diagnosed last month on chemotherapy gemcitabine and abraxane s/p 1st dose on 12/12/19, A fib on eliquis, DM, hypertension, DLD, colon ca s/p resection presented to the hospital with abdominal pain for 1 week. Patient has been having crampy epigastric pain radiating to the back, 8/10 in intensity, constant, progressively worsening for 1 week. Also had multiple episodes of bilious vomiting, associated with decreased PO intake. C/o chills but no subjective fevers at home. Patient noticed 40lbs weight loss in the last 11 months. She was being worked up for pancreatic mass since May 2019 with 2-3 negative biopsy results but recent biopsy came back positive for pancreatic cancer. She was started on chemo, first dose received last Thursday by Dr. Vela. She went to see Dr. Vela in her office today as the pain was worsening, she had blood work that showed elevated LFTs so she was sent to the hospital. Patient has family history of pancreatic cancer, in niece. Patient was a smoker for 30yrs, used to smoke 1/2pc/day but stopped 20yrs ago.  In ED, /92mm Hg, /min, 97.9F. LFTs showed , , Alkaline phosphatase 1416, total bilirubin 5.9, lipase 6. CT abdomen w and w/o contrast showed no evidence of intestinal obstruction, marked intrahepatic bile duct dilatation and dilatation of the common hepatic duct down to the level of the pancreatic mass. Patient was in A fib with RVR, s/p 10mg cardizem IV and 60mg PO cardizem.    PROBLEMS  1. SIRS (T<96.8F,  Pulse>90, wbc<4)    New problem with additional W/U   acute illness with systemic symptoms     2.  Suspected cholangitis in pt with pancreatic CA vs tumor related biliary obstruction  AP 1416,   CT: The previously noted mass involving the pancreatic head and body measuring 5.6 x 6.9 cm on the previous PET/CT scan, now measures 10.6 x 8.4 cm. Marked intrahepatic bile duct dilatation and dilatation of the common hepatic duct down to the level of the pancreatic mass.    On piperacillin/tazobactam IVPB.. 3.375 Gram(s) IV Intermittent every 8 hours    2. Malnutrition  40lbs weight loss   BMI<19  BMI (kg/m2): 18.9 (12-18-19 @ 16:18)    PLAN  - Await blood cultures, urine culture  - Continue Zosyn  - Repeat  wbc
Patient is an 83 y/o female with PMHxof Pancreatic cancer ( diagnosed last month on chemotherapy gemcitabine and abraxane s/p 1st dose on 12/12/19- Followed by Dr. Vela- had 2 prior EUS FNA with Dr. Hatch - than referred to [facility] for further evaluation) , A fib on eliquis, DM, hypertension, DLD, colon ca s/p prior resection presented to the hospital with abdominal pain for 1 week. Patient has been having crampy epigastric pain radiating to the back, 8/10 in intensity, constant, progressively worsening for 1 week. Also had multiple episodes of bilious vomiting, associated with decreased PO intake. She notes chills but no subjective fevers at home. Patient noticed 40lbs weight loss in the last 11 months. She was being worked up for Pancreatic mass since May 2019 with 2-3 negative biopsy results but recent biopsy came back positive for pancreatic cancer. She was started on chemo, first dose received last Thursday by . Patient notes to me no bowel movement for almost one week and she has never been constipated this long. She was seen initially in the ED and repeat imaging was obtained. There is a sizeable increase in the Pancreatic Mass ( Head and body). Overall does not appear clinically with Cholangitis with given vitals, WBC, and interview. She has Pancreatic Cancer and the mass has doubled in size since prior imaging. Clinically while on chemotherapy this would be palliative rather than curative. Patient on AC and ERCP for decompression would be very challenging given location and size of tumor.     Abdominal pain/ Constipation/ Pancreatic Cancer ( tumor 10.6cm at level of Head and body)  - Agree with fluids and ABX as per ID for now  - On AC- Heparin Gtt running ( was on Eliquis)  - Consider IR consult if Cholangitis develops  - Would suggest Relistor for constipation secondary to disease and Opiod use  - Consider getting a bedside abdominal plain film  - Will discuss case with Advanced Attending to see if Endoscopic intervention could be considered  - Overall poor prognosis given diagnosis and co morbidities

## 2019-12-19 NOTE — H&P ADULT - NSICDXPASTSURGICALHX_GEN_ALL_CORE_FT
PAST SURGICAL HISTORY:  H/O hernia repair abdomen    History of cataract surgery     History of cholecystectomy     History of colon resection     History of tonsillectomy

## 2019-12-19 NOTE — ED ADULT NURSE REASSESSMENT NOTE - NS ED NURSE REASSESS COMMENT FT1
pt is aaox3, nad, respirations easy and regular, skin is warm, dry, and normal in color, pt is resting and comfortable, no complaints at this time

## 2019-12-19 NOTE — H&P ADULT - ASSESSMENT
Patient is an 85 y/o F with PMH of Pancreatic cancer diagnosed last month on chemotherapy gemcitabine and abraxane s/p 1st dose on 12/12/19, A fib on eliquis, DM, hypertension, DLD, colon ca s/p resection presented to the hospital with abdominal pain, decreased PO intake and vomitings for 1 week. LFTs showed , , Alkaline phosphatase 1416, total bilirubin 5.9, lipase 6. CT abdomen w and w/o contrast showed dilated intrahepatic and common hepatic duct to the level of the pancreatic mass.     ASSESSMENT AND PLAN:    #Sepsis 2/2 Acute cholangitis  -h/o pancreatic cancer recent diagnosis with elevated LFTs, low WBC on admission with chills at home   -CT abdomen showed mass involving the pancreatic head and body measuring 5.6 x 6.9 cm on the previous PET/CT scan, now measuring 10.6 x 8.4 cm. Marked intrahepatic bile duct dilatation and dilatation of the common hepatic duct down to the level of the pancreatic mass  -s/p LR bolus in ER  -Started on Zosyn 3.75 q6h, IV fluids NS @75cc/hr   -GI consult placed  -NPO for possible ERCP with stent placement    #Atrial fibrillation  -Patient did not take any medications at home due to vomiting  -A fib with RVR in ED. s/p cardizem 10mg IV, 60mg PO  -c/w cardizem 120mg TID PO. Hold eliquis for ERCP  -Started on heparin gtt for the procedure   -Monitor on telemetry    #Pancreatic cancer  -On gemcitabine and abraxane  -s/p 1 dose on 12/19. Next dose next week  -Hem onc following,  outpatient    #Hypertension  -c/w lisinopril, cardizem    #DM   -Monitor FS and start on insulin if FS > 200    #DLD  -c/w atorvastatin    #diet: NPO  #DVT ppx: SCD   #GI ppx: protonix  #Dispo:Acute Patient is an 83 y/o F with PMH of Pancreatic cancer diagnosed last month on chemotherapy gemcitabine and abraxane s/p 1st dose on 12/12/19, A fib on eliquis, DM, hypertension, DLD, colon ca s/p resection presented to the hospital with abdominal pain, decreased PO intake and vomitings for 1 week. LFTs showed , , Alkaline phosphatase 1416, total bilirubin 5.9, lipase 6. CT abdomen w and w/o contrast showed dilated intrahepatic and common hepatic duct to the level of the pancreatic mass.     ASSESSMENT AND PLAN:    #Sepsis 2/2 Acute cholangitis  -h/o pancreatic cancer recent diagnosis with elevated LFTs, low WBC on admission with chills at home   -CT abdomen showed mass involving the pancreatic head and body measuring 5.6 x 6.9 cm on the previous PET/CT scan, now measuring 10.6 x 8.4 cm. Marked intrahepatic bile duct dilatation and dilatation of the common hepatic duct down to the level of the pancreatic mass  -s/p LR bolus in ER. Blood cultures ordered  -Started on Zosyn 3.75 q6h, IV fluids NS @75cc/hr   -GI consult placed  -NPO for possible ERCP with stent placement    #Atrial fibrillation  -Patient did not take any medications at home due to vomiting  -A fib with RVR in ED. s/p cardizem 10mg IV, 60mg PO  -c/w cardizem 120mg TID PO. Hold eliquis for ERCP  -Started on heparin gtt for the procedure   -Monitor on telemetry    #Pancreatic cancer  -On gemcitabine and abraxane  -s/p 1 dose on 12/19. Next dose next week  -Hem onc following,  outpatient    #Hypertension  -c/w lisinopril, cardizem    #DM   -Monitor FS and start on insulin if FS > 200    #DLD  -c/w atorvastatin    #diet: NPO  #DVT ppx: SCD   #GI ppx: protonix  #Dispo:Acute Patient is an 85 y/o F with PMH of Pancreatic cancer diagnosed last month on chemotherapy gemcitabine and abraxane s/p 1st dose on 12/12/19, A fib on eliquis, DM, hypertension, DLD, colon ca s/p resection presented to the hospital with abdominal pain, decreased PO intake and vomitings for 1 week. LFTs showed , , Alkaline phosphatase 1416, total bilirubin 5.9, lipase 6. CT abdomen w and w/o contrast showed dilated intrahepatic and common hepatic duct to the level of the pancreatic mass.     ASSESSMENT AND PLAN:    #Sepsis 2/2 Acute cholangitis  -h/o pancreatic cancer recent diagnosis with elevated LFTs, low WBC on admission with chills at home   -Physical exam showed diffuse tenderness more in epigastric region  -CT abdomen showed mass involving the pancreatic head and body measuring 5.6 x 6.9 cm on the previous PET/CT scan, now measuring 10.6 x 8.4 cm. Marked intrahepatic bile duct dilatation and dilatation of the common hepatic duct down to the level of the pancreatic mass  -s/p LR bolus in ER. Blood cultures ordered  -Started on Zosyn 3.75 q6h, IV fluids NS @75cc/hr   -Morphine PRN for pain  -GI consult placed  -NPO for possible ERCP with stent placement    #Atrial fibrillation  -Patient did not take any medications at home due to vomiting  -A fib with RVR in ED. s/p cardizem 10mg IV, 60mg PO  -c/w cardizem 120mg TID PO. Hold eliquis for ERCP  -Started on heparin gtt for the procedure   -Monitor on telemetry    #Pancreatic cancer  -On gemcitabine and abraxane  -s/p 1 dose on 12/19. Next dose next week  -Hem onc following,  outpatient    #Hypertension  -c/w lisinopril, cardizem    #DM   -Monitor FS and start on insulin if FS > 200    #DLD  -c/w atorvastatin    #diet: NPO  #DVT ppx: SCD   #GI ppx: protonix  #Dispo:Acute

## 2019-12-19 NOTE — H&P ADULT - NSHPREVIEWOFSYSTEMS_GEN_ALL_CORE
CONSTITUTIONAL: No weakness, fevers or chills, no weight loss   EYES/ENT: No visual changes;  No vertigo or throat pain   NECK: No pain or stiffness  RESPIRATORY: No cough, wheezing, hemoptysis; No shortness of breath  CARDIOVASCULAR: No chest pain or palpitations  GASTROINTESTINAL: + abdominal pain. + nausea, vomiting, No hematemesis; No diarrhea or constipation. No melena or hematochezia.  GENITOURINARY: No dysuria, frequency or hematuria  NEUROLOGICAL: No numbness or weakness  All other review of systems is negative unless indicated above.

## 2019-12-19 NOTE — PROGRESS NOTE ADULT - ASSESSMENT
started on antibiotics  procedure by IR tomorrow to alleviate the obstruction for palliation   pt also needs aport or pic line for chemo   pl ask IR if they could dfo that also   will follow

## 2019-12-19 NOTE — PROGRESS NOTE ADULT - SUBJECTIVE AND OBJECTIVE BOX
newly diagnosed pancreatic cancer unresectable  1cycle of chemo last weeki  admitted with worsening of LFTS and abd pain  pt seen by GI,id AND IR  CT scan abdomen done, sev biliary dilatation obstruction from the mass

## 2019-12-19 NOTE — PROGRESS NOTE ADULT - SUBJECTIVE AND OBJECTIVE BOX
INTERVENTIONAL RADIOLOGY CONSULT:     Procedure Requested: PTC    HPI:  Patient is an 85 y/o F with PMH of Pancreatic cancer diagnosed last month on chemotherapy gemcitabine and abraxane s/p 1st dose on 12/12/19, A fib on eliquis, DM, hypertension, DLD, colon ca s/p resection presented to the hospital with abdominal pain for 1 week. Patient has been having crampy epigastric pain radiating to the back, 8/10 in intensity, constant, progressively worsening for 1 week. Also had multiple episodes of bilious vomiting, associated with decreased PO intake. C/o chills but no subjective fevers at home. Patient noticed 40lbs weight loss in the last 11 months. She was being worked up for pancreatic mass since May 2019 with 2-3 negative biopsy results but recent biopsy came back positive for pancreatic cancer. She was started on chemo, first dose received last Thursday by . She went to see  in her office today as the pain was worsening, she had blood work that showed elevated LFTs so she was sent to the hospital. Patient has family history of pancreatic cancer, in niece. Patient was a smoker for 30yrs, used to smoke 1/2pc/day but stopped 20yrs ago.  In ED, /92mm Hg, /min, 97.9F. LFTs showed , , Alkaline phosphatase 1416, total bilirubin 5.9, lipase 6. CT abdomen w and w/o contrast showed no evidence of intestinal obstruction, mrked intrahepatic bile duct dilatation and dilatation of the common hepatic duct down to the level of the pancreatic mass. Patient was in A fib with RVR, s/p 10mg cardizem IV and 60mg PO cardizem. (19 Dec 2019 00:04)      PAST MEDICAL & SURGICAL HISTORY:  Pancreatic cancer  Anxiety  Hypercholesteremia  Afib  DM (diabetes mellitus)  Acquired cataract  Abnormal serum cholesterol  Generalized OA  Mildly obese  H/O: HTN (hypertension)  History of tonsillectomy  History of cataract surgery  History of colon resection  History of cholecystectomy  H/O hernia repair: abdomen      MEDICATIONS  (STANDING):  atorvastatin 20 milliGRAM(s) Oral at bedtime  diltiazem    Tablet 120 milliGRAM(s) Oral three times a day  dorzolamide 2% Ophthalmic Solution 1 Drop(s) Left EYE three times a day  FLUoxetine 20 milliGRAM(s) Oral daily  heparin  Infusion 900 Unit(s)/Hr (9 mL/Hr) IV Continuous <Continuous>  latanoprost 0.005% Ophthalmic Solution 1 Drop(s) Right EYE at bedtime  lisinopril 40 milliGRAM(s) Oral daily  piperacillin/tazobactam IVPB.. 3.375 Gram(s) IV Intermittent every 8 hours  polyethylene glycol 3350 17 Gram(s) Oral once  senna 2 Tablet(s) Oral daily  sodium chloride 0.9%. 1000 milliLiter(s) (75 mL/Hr) IV Continuous <Continuous>    MEDICATIONS  (PRN):  morphine  - Injectable 2 milliGRAM(s) IV Push every 6 hours PRN Severe Pain (7 - 10)      Allergies    No Known Allergies    Intolerances        Social History:   Smoking: Yes [ ]  No [ ]   ______pk yrs  ETOH  Yes [ ]  No [ ]  Social [ ]  DRUGS:  Yes [ ]  No [ ]  if so what______________    FAMILY HISTORY:      Physical Exam:   Vital Signs Last 24 Hrs  T(C): 35.9 (19 Dec 2019 07:50), Max: 36.7 (19 Dec 2019 05:26)  T(F): 96.6 (19 Dec 2019 07:50), Max: 98 (19 Dec 2019 05:26)  HR: 99 (19 Dec 2019 07:50) (99 - 116)  BP: 110/60 (19 Dec 2019 07:50) (110/60 - 126/75)  BP(mean): --  RR: 18 (19 Dec 2019 07:50) (18 - 18)  SpO2: 97% (19 Dec 2019 07:50) (97% - 98%)    General:   NAD, A/Ox3  Lungs:  regular, non labored   Cardiovascular:   regular  Abdomen:  soft NTND      Labs:                         11.7   3.95  )-----------( 215      ( 19 Dec 2019 05:48 )             33.4     12-19    136  |  98  |  12  ----------------------------<  192<H>  3.9   |  22  |  <0.5<L>    Ca    8.5      19 Dec 2019 05:48  Mg     1.8     12-19    TPro  5.2<L>  /  Alb  3.1<L>  /  TBili  5.4<H>  /  DBili  x   /  AST  403<H>  /  ALT  205<H>  /  AlkPhos  1400<H>  12-19    PT/INR - ( 19 Dec 2019 05:48 )   PT: 15.00 sec;   INR: 1.31 ratio         PTT - ( 19 Dec 2019 05:48 )  PTT:37.1 sec    Pertinent labs:                      11.7   3.95  )-----------( 215      ( 19 Dec 2019 05:48 )             33.4       12-19    136  |  98  |  12  ----------------------------<  192<H>  3.9   |  22  |  <0.5<L>    Ca    8.5      19 Dec 2019 05:48  Mg     1.8     12-19    TPro  5.2<L>  /  Alb  3.1<L>  /  TBili  5.4<H>  /  DBili  x   /  AST  403<H>  /  ALT  205<H>  /  AlkPhos  1400<H>  12-19      PT/INR - ( 19 Dec 2019 05:48 )   PT: 15.00 sec;   INR: 1.31 ratio         PTT - ( 19 Dec 2019 05:48 )  PTT:37.1 sec    Radiology & Additional Studies:     Radiology imaging reviewed.       ASSESSMENT/ PLAN:   85 yo F with pancreatic mass and biliary obstruction  Discussed with primary, GI unlikely to be able to cross ampullary mass;  Plan for PTC tomorrow in IR under conscious sedation.   NPO PMN      Risks, benefits, and alternatives to treatment discussed. All questions answered with understanding.    Thank you for the courtesy of this consult, please call [phone] with any further questions. INTERVENTIONAL RADIOLOGY CONSULT:     Procedure Requested: PTC    HPI:  Patient is an 85 y/o F with PMH of Pancreatic cancer diagnosed last month on chemotherapy gemcitabine and abraxane s/p 1st dose on 12/12/19, A fib on eliquis, DM, hypertension, DLD, colon ca s/p resection presented to the hospital with abdominal pain for 1 week. Patient has been having crampy epigastric pain radiating to the back, 8/10 in intensity, constant, progressively worsening for 1 week. Also had multiple episodes of bilious vomiting, associated with decreased PO intake. C/o chills but no subjective fevers at home. Patient noticed 40lbs weight loss in the last 11 months. She was being worked up for pancreatic mass since May 2019 with 2-3 negative biopsy results but recent biopsy came back positive for pancreatic cancer. She was started on chemo, first dose received last Thursday by . She went to see  in her office today as the pain was worsening, she had blood work that showed elevated LFTs so she was sent to the hospital. Patient has family history of pancreatic cancer, in niece. Patient was a smoker for 30yrs, used to smoke 1/2pc/day but stopped 20yrs ago.  In ED, /92mm Hg, /min, 97.9F. LFTs showed , , Alkaline phosphatase 1416, total bilirubin 5.9, lipase 6. CT abdomen w and w/o contrast showed no evidence of intestinal obstruction, mrked intrahepatic bile duct dilatation and dilatation of the common hepatic duct down to the level of the pancreatic mass. Patient was in A fib with RVR, s/p 10mg cardizem IV and 60mg PO cardizem. (19 Dec 2019 00:04)      PAST MEDICAL & SURGICAL HISTORY:  Pancreatic cancer  Anxiety  Hypercholesteremia  Afib  DM (diabetes mellitus)  Acquired cataract  Abnormal serum cholesterol  Generalized OA  Mildly obese  H/O: HTN (hypertension)  History of tonsillectomy  History of cataract surgery  History of colon resection  History of cholecystectomy  H/O hernia repair: abdomen      MEDICATIONS  (STANDING):  atorvastatin 20 milliGRAM(s) Oral at bedtime  diltiazem    Tablet 120 milliGRAM(s) Oral three times a day  dorzolamide 2% Ophthalmic Solution 1 Drop(s) Left EYE three times a day  FLUoxetine 20 milliGRAM(s) Oral daily  heparin  Infusion 900 Unit(s)/Hr (9 mL/Hr) IV Continuous <Continuous>  latanoprost 0.005% Ophthalmic Solution 1 Drop(s) Right EYE at bedtime  lisinopril 40 milliGRAM(s) Oral daily  piperacillin/tazobactam IVPB.. 3.375 Gram(s) IV Intermittent every 8 hours  polyethylene glycol 3350 17 Gram(s) Oral once  senna 2 Tablet(s) Oral daily  sodium chloride 0.9%. 1000 milliLiter(s) (75 mL/Hr) IV Continuous <Continuous>    MEDICATIONS  (PRN):  morphine  - Injectable 2 milliGRAM(s) IV Push every 6 hours PRN Severe Pain (7 - 10)      Allergies    No Known Allergies        Physical Exam:   Vital Signs Last 24 Hrs  T(C): 35.9 (19 Dec 2019 07:50), Max: 36.7 (19 Dec 2019 05:26)  T(F): 96.6 (19 Dec 2019 07:50), Max: 98 (19 Dec 2019 05:26)  HR: 99 (19 Dec 2019 07:50) (99 - 116)  BP: 110/60 (19 Dec 2019 07:50) (110/60 - 126/75)  BP(mean): --  RR: 18 (19 Dec 2019 07:50) (18 - 18)  SpO2: 97% (19 Dec 2019 07:50) (97% - 98%)    General:   NAD, A/Ox3  Lungs:  regular, non labored   Cardiovascular:   regular  Abdomen:  soft NTND      Labs:                         11.7   3.95  )-----------( 215      ( 19 Dec 2019 05:48 )             33.4     12-19    136  |  98  |  12  ----------------------------<  192<H>  3.9   |  22  |  <0.5<L>    Ca    8.5      19 Dec 2019 05:48  Mg     1.8     12-19    TPro  5.2<L>  /  Alb  3.1<L>  /  TBili  5.4<H>  /  DBili  x   /  AST  403<H>  /  ALT  205<H>  /  AlkPhos  1400<H>  12-19    PT/INR - ( 19 Dec 2019 05:48 )   PT: 15.00 sec;   INR: 1.31 ratio         PTT - ( 19 Dec 2019 05:48 )  PTT:37.1 sec    Pertinent labs:                      11.7   3.95  )-----------( 215      ( 19 Dec 2019 05:48 )             33.4       12-19    136  |  98  |  12  ----------------------------<  192<H>  3.9   |  22  |  <0.5<L>    Ca    8.5      19 Dec 2019 05:48  Mg     1.8     12-19    TPro  5.2<L>  /  Alb  3.1<L>  /  TBili  5.4<H>  /  DBili  x   /  AST  403<H>  /  ALT  205<H>  /  AlkPhos  1400<H>  12-19      PT/INR - ( 19 Dec 2019 05:48 )   PT: 15.00 sec;   INR: 1.31 ratio         PTT - ( 19 Dec 2019 05:48 )  PTT:37.1 sec    Radiology & Additional Studies:     Radiology imaging reviewed.       ASSESSMENT/ PLAN:   85 yo F with pancreatic mass and biliary obstruction  Discussed with primary and GI, GI unlikely to be able to cross ampullary mass;  Plan for PTC tomorrow in IR under conscious sedation.   NPO PMN      Risks, benefits, and alternatives to treatment discussed. All questions answered with understanding.    Thank you for the courtesy of this consult, please call [phone] with any further questions.

## 2019-12-19 NOTE — CONSULT NOTE ADULT - ATTENDING COMMENTS
85 y/o F with pancreatic cancer and biliary obstruction who presents with abd pain and constipation. Imaging shows elevated LFTs and dilated bile duct from mass obstruction. Pt has no s/sxs infection (afebrile, no leukocytosis).     Would benefit from decompression - either with ERCP or IR drainage. If done with ERCP will need to be off a/c for 2 days for sphincterotomy.

## 2019-12-19 NOTE — H&P ADULT - NSHPPHYSICALEXAM_GEN_ALL_CORE
PHYSICAL EXAM:  GENERAL: NAD, lying in bed comfortably  HEAD:  Atraumatic, Normocephalic  EYES: EOMI, PERRLA, conjunctiva and sclera clear  ENT: Moist mucous membranes  NECK: Supple, No JVD  CHEST/LUNG: Clear to auscultation bilaterally; No rales, rhonchi, wheezing, or rubs. Unlabored respirations  HEART: Regular rate and rhythm; No murmurs, rubs, or gallops  ABDOMEN: Bowel sounds present; Soft, tenderness + epigastric region, Nondistended. No hepatomegaly. Umbilical hernia +  EXTREMITIES:  2+ Peripheral Pulses, brisk capillary refill. No clubbing, cyanosis, or edema  NERVOUS SYSTEM:  Alert & Oriented X3, speech clear. No deficits   MSK: FROM all 4 extremities, full and equal strength  SKIN: No rashes or lesions

## 2019-12-19 NOTE — H&P ADULT - NSHPLABSRESULTS_GEN_ALL_CORE
Complete Blood Count + Automated Diff (12.18.19 @ 16:10)    WBC Count: 2.82 K/uL    RBC Count: 3.89 M/uL    Hemoglobin: 12.9 g/dL    Hematocrit: 36.7 %    Mean Cell Volume: 94.3 fL    Mean Cell Hemoglobin: 33.2 pg    Mean Cell Hemoglobin Conc: 35.1 g/dL    Red Cell Distrib Width: 15.0 %    Platelet Count - Automated: 221 K/uL    Auto Neutrophil #: 2.09 K/uL    Auto Lymphocyte #: 0.68 K/uL    Auto Monocyte #: 0.04 K/uL    Auto Eosinophil #: 0.00 K/uL    Auto Basophil #: 0.00 K/uL    Auto Neutrophil %: 74.1: Differential percentages must be correlated with absolute numbers for  clinical significance. %    Auto Lymphocyte %: 24.1 %    Auto Monocyte %: 1.4 %    Auto Eosinophil %: 0.0 %    Auto Basophil %: 0.0 %    Auto Immature Granulocyte %: 0.4 %    Nucleated RBC: 0 /100 WBCs    Basic Metabolic Panel (12.18.19 @ 16:10)    Sodium, Serum: 136 mmol/L    Potassium, Serum: 4.0 mmol/L    Chloride, Serum: 97 mmol/L    Carbon Dioxide, Serum: 25 mmol/L    Anion Gap, Serum: 14 mmol/L    Blood Urea Nitrogen, Serum: 15 mg/dL    Creatinine, Serum: <0.5: Icteric. Interpret with caution mg/dL    Glucose, Serum: 192 mg/dL    Calcium, Total Serum: 9.0 mg/dL    eGFR if Non : 97: Interpretative comment  The units for eGFR are mL/min/1.73M2 (normalized body surface area). The  eGFR is calculated from a serum creatinine using the CKD-EPI equation.  Other variables required for calculation are race, age and sex. Among  patients with chronic kidney disease (CKD), the eGFR is useful in  determining the stage of disease according to KDOQI CKD classification.  All eGFR results are reported numerically with the following  interpretation.          GFR                    With                 Without     (ml/min/1.73 m2)    Kidney Damage       Kidney Damage        >= 90                    Stage 1                     Normal        60-89                    Stage 2                     Decreased GFR        30-59     Stage 3                     Stage 3        15-29                    Stage 4                     Stage 4        < 15                      Stage 5                     Stage 5      < from: CT Abdomen and Pelvis w/ Oral Cont and w/ IV Cont (12.18.19 @ 17:57) >    IMPRESSION: The previously noted mass involving the pancreatic head and body measuring 5.6 x 6.9 cm on the previous PET/CT scan, now measures 10.6 x 8.4 cm. Marked intrahepatic bile duct dilatation and dilatation of the common hepatic duct down to the level of the pancreatic mass.      < end of copied text >    < from: NM PET/CT Onc FDG Skull to Thigh, Inital (10.22.19 @ 13:49) >    IMPRESSION:   1.  Large 8.0 x 5.6 cm FDG avid pancreatic head/body mass is compatible   with biopsy-proven neoplasm (max SUV 17.2).    2.  No definite evidence of FDG avid metastatic disease in the chest,   abdomen or pelvis.      < end of copied text >

## 2019-12-19 NOTE — H&P ADULT - ATTENDING COMMENTS
Patient seen and examined independently. Agree with resident note/ history / physical exam and plan of care with following exceptions/additions/updates. Case discussed with house-staff, nursing and patient/pt decision maker.     Vital Signs Last 24 Hrs  T(C): 35.9 (19 Dec 2019 07:50), Max: 36.7 (19 Dec 2019 05:26)  T(F): 96.6 (19 Dec 2019 07:50), Max: 98 (19 Dec 2019 05:26)  HR: 99 (19 Dec 2019 07:50) (99 - 116)  BP: 110/60 (19 Dec 2019 07:50) (110/60 - 126/75)  BP(mean): --  RR: 18 (19 Dec 2019 07:50) (18 - 18)  SpO2: 97% (19 Dec 2019 07:50) (97% - 98%)    has no pain, n/v or fever.     Physical exam:   constitutional NAD, AAOX3, Respiratory  lungs CTA, CVS heart RRR, GI: abdomen Soft NT, ND, BS+, skin: intact, jaundiced.   neuro exam non focal.     obstructive jaundice, plan is ERCP. with cholangitis.     #Progress Note Handoff  Pending (specify):  ERCP   Family discussion: plan dw pt, full code. pt is not ready to discuss goals of care at this point.   Disposition: Home

## 2019-12-19 NOTE — CONSULT NOTE ADULT - SUBJECTIVE AND OBJECTIVE BOX
s/p ist cycle of chemo  GEM AND ABRAXANE  LAST WEEK   CANCER LOCALLY ADVANCED AND NOT RESECTABLE
Patient is an 83 y/o female with PMHxof Pancreatic cancer ( diagnosed last month on chemotherapy gemcitabine and abraxane s/p 1st dose on 12/12/19- Followed by Dr. Vela- had 2 prior EUS FNA with Dr. Hatch - than referred to [facility] for further evaluation) , A fib on eliquis, DM, hypertension, DLD, colon ca s/p prior resection presented to the hospital with abdominal pain for 1 week. Patient has been having crampy epigastric pain radiating to the back, 8/10 in intensity, constant, progressively worsening for 1 week. Also had multiple episodes of bilious vomiting, associated with decreased PO intake. She notes chills but no subjective fevers at home. Patient noticed 40lbs weight loss in the last 11 months. She was being worked up for Pancreatic mass since May 2019 with 2-3 negative biopsy results but recent biopsy came back positive for pancreatic cancer. She was started on chemo, first dose received last Thursday by . Patient notes to me no bowel movement for almost one week and she has never been constipated this long. She was seen initially in the ED and repeat imaging was obtained. There is a sizeable increase in the Pancreatic Mass ( Head and body).       PAST MEDICAL & SURGICAL HISTORY:  Pancreatic cancer  Anxiety  Hypercholesteremia  Afib  DM (diabetes mellitus)  Acquired cataract  Abnormal serum cholesterol  Generalized OA  Mildly obese  H/O: HTN (hypertension)  History of tonsillectomy  History of cataract surgery  History of colon resection  History of cholecystectomy  H/O hernia repair: abdomen    Social History  Denies Current Tobacco use  Denies Current ETOH use  Denies Current Illicit Drug use       Family Hx:  Father: Non Contributory   Mother: Non Contributory      MEDICATIONS  (STANDING):  atorvastatin 20 milliGRAM(s) Oral at bedtime  diltiazem    Tablet 120 milliGRAM(s) Oral three times a day  dorzolamide 2% Ophthalmic Solution 1 Drop(s) Left EYE three times a day  FLUoxetine 20 milliGRAM(s) Oral daily  heparin  Infusion 900 Unit(s)/Hr (9 mL/Hr) IV Continuous <Continuous>  latanoprost 0.005% Ophthalmic Solution 1 Drop(s) Right EYE at bedtime  lisinopril 40 milliGRAM(s) Oral daily  piperacillin/tazobactam IVPB.. 3.375 Gram(s) IV Intermittent every 8 hours  polyethylene glycol 3350 17 Gram(s) Oral once  senna 2 Tablet(s) Oral daily  sodium chloride 0.9%. 1000 milliLiter(s) (75 mL/Hr) IV Continuous <Continuous>    MEDICATIONS  (PRN):  morphine  - Injectable 2 milliGRAM(s) IV Push every 6 hours PRN Severe Pain (7 - 10)      Allergies  No Known Allergies      Review of Systems  General:  See HPI  HEENT: Denies Trouble Swallowing ,Denies  Sore Throat , Denies Change in hearing/vision/speech ,Denies Dizziness    Cardio: Denies  Chest Pain , Palpitations    Respiratory: Denies worsening of SOB, Denies Cough  Abdomen: See detailed HPI  Neuro: Denies Headache Denies Dizziness, Denies Paresthesias  MSK: Denies pain in Bones/Joints/Muscles   Psych: Patient denies depression, denies suicidal or homicidal ideations  Integ: Patient Denies rash, or new skin lesions     Vital Signs:  T(F): 96.6 (19 Dec 2019 07:50), Max: 98 (19 Dec 2019 05:26)  HR: 99 (19 Dec 2019 07:50) (99 - 117)  BP: 110/60 (19 Dec 2019 07:50) (110/60 - 127/92)  RR: 18 (19 Dec 2019 07:50) (18 - 18)  SpO2: 97% (19 Dec 2019 07:50) (97% - 99%)  Physical Exam  Gen: NAD  HEENT: NC/AT, Mucosal Membranes Dry  Cardio: S1/S2 Irrgeular  Resp: Anterior CTA B/L  Abdomen: distended, diffuse tenderness on deep palpation, scars noted  Neuro: AAOx3  Extremities: FROM x 4    LABS:                        11.7   3.95  )-----------( 215      ( 19 Dec 2019 05:48 )             33.4       12-19    136  |  98  |  12  ----------------------------<  192<H>  3.9   |  22  |  <0.5<L>    Ca    8.5      19 Dec 2019 05:48  Mg     1.8     12-19    TPro  5.2<L>  /  Alb  3.1<L>  /  TBili  5.4<H>  /  DBili  x   /  AST  403<H>  /  ALT  205<H>  /  AlkPhos  1400<H>  12-19    PT/INR - ( 19 Dec 2019 05:48 )   PT: 15.00 sec;   INR: 1.31 ratio         PTT - ( 19 Dec 2019 05:48 )  PTT:37.1 sec      RADIOLOGY & ADDITIONAL STUDIES:  CT Abdomen and Pelvis w/ Oral Cont and w/ IV Cont 12.18.19  IMPRESSION: The previously noted mass involving the pancreatic head and body measuring 5.6 x 6.9 cm on the previous PET/CT scan, now measures 10.6 x 8.4 cm. Marked intrahepatic bile duct dilatation and dilatation of the common hepatic duct down to the level of the pancreatic mass.
ZHAO KUMAR  84y, Female  Allergy: No Known Allergies      CHIEF COMPLAINT: Abdominal pain (19 Dec 2019 00:04)      HPI:  Patient is an 85 y/o F with PMH of Pancreatic cancer diagnosed last month on chemotherapy gemcitabine and abraxane s/p 1st dose on 12/12/19, A fib on eliquis, DM, hypertension, DLD, colon ca s/p resection presented to the hospital with abdominal pain for 1 week. Patient has been having crampy epigastric pain radiating to the back, 8/10 in intensity, constant, progressively worsening for 1 week. Also had multiple episodes of bilious vomiting, associated with decreased PO intake. C/o chills but no subjective fevers at home. Patient noticed 40lbs weight loss in the last 11 months. She was being worked up for pancreatic mass since May 2019 with 2-3 negative biopsy results but recent biopsy came back positive for pancreatic cancer. She was started on chemo, first dose received last Thursday by . She went to see  in her office today as the pain was worsening, she had blood work that showed elevated LFTs so she was sent to the hospital. Patient has family history of pancreatic cancer, in niece. Patient was a smoker for 30yrs, used to smoke 1/2pc/day but stopped 20yrs ago.  In ED, /92mm Hg, /min, 97.9F. LFTs showed , , Alkaline phosphatase 1416, total bilirubin 5.9, lipase 6. CT abdomen w and w/o contrast showed no evidence of intestinal obstruction, mrked intrahepatic bile duct dilatation and dilatation of the common hepatic duct down to the level of the pancreatic mass. Patient was in A fib with RVR, s/p 10mg cardizem IV and 60mg PO cardizem. (19 Dec 2019 00:04)    FAMILY HISTORY:    PAST MEDICAL & SURGICAL HISTORY:  Pancreatic cancer  Anxiety  Hypercholesteremia  Afib  DM (diabetes mellitus)  Acquired cataract  Abnormal serum cholesterol  Generalized OA  Mildly obese  H/O: HTN (hypertension)  History of tonsillectomy  History of cataract surgery  History of colon resection  History of cholecystectomy  H/O hernia repair: abdomen      Substance Use ( x ) never used  (  ) IVDU (  ) Other:  Tobacco Usage:  (   ) never smoked   (x   ) former smoker   (   ) current smoker   Alcohol Usage: (   ) social  (   ) daily use ( x  ) denies  Sexual History: na      ROS  General: Denies fevers, chills, nightsweats, weight loss  HEENT: Denies headache, rhinorrhea, sore throat, eye pain  CV: Denies CP, palpitations  PULM: Denies SOB, cough  GI: Denies abdominal pain, diarrhea, pancreatic cancer  : Denies dysuria, hematuria  MSK: Denies arthralgias  SKIN: Denies rash   NEURO: Denies paresthesias, weakness  PSYCH: Denies depression    VITALS:  T(F): 96.6, Max: 98 (12-19-19 @ 05:26)  HR: 99  BP: 110/60  RR: 18Vital Signs Last 24 Hrs  T(C): 35.9 (19 Dec 2019 07:50), Max: 36.7 (19 Dec 2019 05:26)  T(F): 96.6 (19 Dec 2019 07:50), Max: 98 (19 Dec 2019 05:26)  HR: 99 (19 Dec 2019 07:50) (99 - 117)  BP: 110/60 (19 Dec 2019 07:50) (110/60 - 127/92)  BP(mean): --  RR: 18 (19 Dec 2019 07:50) (18 - 18)  SpO2: 97% (19 Dec 2019 07:50) (97% - 99%)    PHYSICAL EXAM:  Gen: NAD, resting in bed  HEENT: Normocephalic, atraumatic  Neck: supple, no lymphadenopathy  CV: Regular rate & regular rhythm  Lungs: decreased BS at bases, no fremitus  Abdomen: Soft, BS present  Ext: Warm, well perfused  Neuro: non focal, awake  Skin: no rash, no erythema    TESTS & MEASUREMENTS:                        12.9   2.82  )-----------( 221      ( 18 Dec 2019 16:10 )             36.7     12-18    136  |  97<L>  |  15  ----------------------------<  192<H>  4.0   |  25  |  <0.5<L>    Ca    9.0      18 Dec 2019 16:10  Mg     1.8     12-18    TPro  5.7<L>  /  Alb  3.2<L>  /  TBili  5.9<H>  /  DBili  5.4<H>  /  AST  500<H>  /  ALT  213<H>  /  AlkPhos  1416<H>  12-18    eGFR if Non African American: 97 mL/min/1.73M2 (12-18-19 @ 16:10)  eGFR if : 112 mL/min/1.73M2 (12-18-19 @ 16:10)    LIVER FUNCTIONS - ( 18 Dec 2019 16:10 )  Alb: 3.2 g/dL / Pro: 5.7 g/dL / ALK PHOS: 1416 U/L / ALT: 213 U/L / AST: 500 U/L / GGT: x                 Lactate, Blood: 1.5 mmol/L (12-18-19 @ 16:10)      INFECTIOUS DISEASES TESTING      RADIOLOGY & ADDITIONAL TESTS:  I have personally reviewed the last Chest xray  CXR      CT  CT Abdomen and Pelvis w/ Oral Cont and w/ IV Cont:   EXAM:  CT ABDOMEN AND PELVIS OC IC            PROCEDURE DATE:  12/18/2019            INTERPRETATION:  CLINICAL STATEMENT: Diffuse abdominal pain; jaundice; pancreatic cancer    TECHNIQUE: Contiguous axial CT images were obtained from the lower chest to the pubic symphysis with oral and intravenous contrast.   Reformatted images in the coronal and sagittal planes were acquired.    COMPARISON CT: PET/CT dated 10/22/2019    OTHER STUDIES USED FOR CORRELATION: None.         FINDINGS    LOWER CHEST:The lung bases are clear. No pleural effusion.     HEPATIOBILIARY: There is marked intrahepatic bile duct dilatation and dilatation of the common hepatic duct down to the level of the large mass arising from the head and body of the pancreas.  The gallbladder is not identified.    SPLEEN: Unremarkable.        PANCREAS: The previously noted mass involving the pancreatic head and body measuring 5.6 x 6.9 cm on the previous PET/CT scan, now measures 10.6 x 8.4 cm and displays inhomogeneous contrast enhancement. If adenopathy is noted in the peripancreatic region. The pancreatic duct is dilated in the region of the tail.    The extrahepatic portal vein and splenic vein are not identified, compatible with compression and/or invasion by the pancreatic mass. The bifurcation of the portal vein in the hepatic hilum is visualized but is of small caliber, and may be filling through collaterals.      ADRENAL GLANDS: Unremarkable.        KIDNEYS: No evidence of hydronephrosis, calcified stones, or solid mass.    ABDOMINOPELVIC NODES: Unremarkable.    PELVIC ORGANS: No evidence of pelvic mass, lymphadenopathy, or fluid collection..        PERITONEUM/MESENTERY/BOWEL: An abdominal wall hernia is noted in the right side of the abdomen. Surgical clips,compatible with previous hernia repair, are noted. No evidence of obstruction or ascites within the abdomen or pelvis.     BONES/SOFT TISSUES: Degenerative changes of the spine are noted.        OTHER:   Vascular calcifications are noted with no evidence of abdominal aortic aneurysm.      IMPRESSION: The previously noted mass involving the pancreatic head and body measuring 5.6 x 6.9 cm on the previous PET/CT scan, now measures 10.6 x 8.4 cm. Marked intrahepatic bile duct dilatation and dilatation of the common hepatic duct down to the level of the pancreatic mass.                    DEBBIE BECKER M.D., ATTENDING RADIOLOGIST  This document has been electronically signed. Dec 18 2019  6:34PM             (12-18-19 @ 17:57)      CARDIOLOGY TESTING  12 Lead ECG:   Ventricular Rate 122 BPM    Atrial Rate 122 BPM    QRS Duration 84 ms    Q-T Interval 312 ms    QTC Calculation(Bezet) 444 ms    R Axis 57 degrees    T Axis 60 degrees    Diagnosis Line Atrial fibrillation with rapid ventricular response  ST & T wave abnormality, consider anterolateral ischemia  Abnormal ECG    Confirmed by Jacky Gallegos ([id number]) on 12/18/2019 7:02:49 PM (12-18-19 @ 15:23)      MEDICATIONS  atorvastatin 20  diltiazem    Tablet 120  dorzolamide 2% Ophthalmic Solution 1  FLUoxetine 20  heparin  Infusion 900  latanoprost 0.005% Ophthalmic Solution 1  lisinopril 40  piperacillin/tazobactam IVPB.. 3.375  sodium chloride 0.9%. 1000      ANTIBIOTICS:  piperacillin/tazobactam IVPB.. 3.375 Gram(s) IV Intermittent every 8 hours      All available historical data has been reviewed

## 2019-12-19 NOTE — H&P ADULT - NSICDXPASTMEDICALHX_GEN_ALL_CORE_FT
PAST MEDICAL HISTORY:  Abnormal serum cholesterol     Acquired cataract     Afib     Anxiety     DM (diabetes mellitus)     Generalized OA     H/O: HTN (hypertension)     Hypercholesteremia     Mildly obese     Pancreatic cancer

## 2019-12-20 ENCOUNTER — RESULT REVIEW (OUTPATIENT)
Age: 84
End: 2019-12-20

## 2019-12-20 LAB
ALBUMIN SERPL ELPH-MCNC: 2.8 G/DL — LOW (ref 3.5–5.2)
ALP SERPL-CCNC: 1123 U/L — HIGH (ref 30–115)
ALT FLD-CCNC: 130 U/L — HIGH (ref 0–41)
ANION GAP SERPL CALC-SCNC: 16 MMOL/L — HIGH (ref 7–14)
APTT BLD: 56.3 SEC — HIGH (ref 27–39.2)
APTT BLD: 59.9 SEC — HIGH (ref 27–39.2)
AST SERPL-CCNC: 152 U/L — HIGH (ref 0–41)
BILIRUB SERPL-MCNC: 2.6 MG/DL — HIGH (ref 0.2–1.2)
BUN SERPL-MCNC: 8 MG/DL — LOW (ref 10–20)
CALCIUM SERPL-MCNC: 8.1 MG/DL — LOW (ref 8.5–10.1)
CHLORIDE SERPL-SCNC: 100 MMOL/L — SIGNIFICANT CHANGE UP (ref 98–110)
CO2 SERPL-SCNC: 21 MMOL/L — SIGNIFICANT CHANGE UP (ref 17–32)
CREAT SERPL-MCNC: <0.5 MG/DL — LOW (ref 0.7–1.5)
GLUCOSE BLDC GLUCOMTR-MCNC: 165 MG/DL — HIGH (ref 70–99)
GLUCOSE BLDC GLUCOMTR-MCNC: 173 MG/DL — HIGH (ref 70–99)
GLUCOSE BLDC GLUCOMTR-MCNC: 204 MG/DL — HIGH (ref 70–99)
GLUCOSE SERPL-MCNC: 210 MG/DL — HIGH (ref 70–99)
HCT VFR BLD CALC: 32.4 % — LOW (ref 37–47)
HGB BLD-MCNC: 11.2 G/DL — LOW (ref 12–16)
MCHC RBC-ENTMCNC: 32.3 PG — HIGH (ref 27–31)
MCHC RBC-ENTMCNC: 34.6 G/DL — SIGNIFICANT CHANGE UP (ref 32–37)
MCV RBC AUTO: 93.4 FL — SIGNIFICANT CHANGE UP (ref 81–99)
NRBC # BLD: 0 /100 WBCS — SIGNIFICANT CHANGE UP (ref 0–0)
PLATELET # BLD AUTO: 153 K/UL — SIGNIFICANT CHANGE UP (ref 130–400)
POTASSIUM SERPL-MCNC: 3.2 MMOL/L — LOW (ref 3.5–5)
POTASSIUM SERPL-SCNC: 3.2 MMOL/L — LOW (ref 3.5–5)
PROT SERPL-MCNC: 4.9 G/DL — LOW (ref 6–8)
RBC # BLD: 3.47 M/UL — LOW (ref 4.2–5.4)
RBC # FLD: 15.2 % — HIGH (ref 11.5–14.5)
SODIUM SERPL-SCNC: 137 MMOL/L — SIGNIFICANT CHANGE UP (ref 135–146)
WBC # BLD: 4.8 K/UL — SIGNIFICANT CHANGE UP (ref 4.8–10.8)
WBC # FLD AUTO: 4.8 K/UL — SIGNIFICANT CHANGE UP (ref 4.8–10.8)

## 2019-12-20 PROCEDURE — 47543 ENDOLUMINAL BX BILIARY TREE: CPT

## 2019-12-20 PROCEDURE — 99233 SBSQ HOSP IP/OBS HIGH 50: CPT

## 2019-12-20 PROCEDURE — 47534 PLMT BILIARY DRAINAGE CATH: CPT

## 2019-12-20 PROCEDURE — 88305 TISSUE EXAM BY PATHOLOGIST: CPT | Mod: 26

## 2019-12-20 RX ORDER — CHLORHEXIDINE GLUCONATE 213 G/1000ML
1 SOLUTION TOPICAL
Refills: 0 | Status: DISCONTINUED | OUTPATIENT
Start: 2019-12-20 | End: 2019-12-27

## 2019-12-20 RX ORDER — HEPARIN SODIUM 5000 [USP'U]/ML
1200 INJECTION INTRAVENOUS; SUBCUTANEOUS
Qty: 25000 | Refills: 0 | Status: DISCONTINUED | OUTPATIENT
Start: 2019-12-20 | End: 2019-12-21

## 2019-12-20 RX ORDER — INFLUENZA VIRUS VACCINE 15; 15; 15; 15 UG/.5ML; UG/.5ML; UG/.5ML; UG/.5ML
0.5 SUSPENSION INTRAMUSCULAR ONCE
Refills: 0 | Status: COMPLETED | OUTPATIENT
Start: 2019-12-20 | End: 2019-12-20

## 2019-12-20 RX ADMIN — PIPERACILLIN AND TAZOBACTAM 25 GRAM(S): 4; .5 INJECTION, POWDER, LYOPHILIZED, FOR SOLUTION INTRAVENOUS at 13:21

## 2019-12-20 RX ADMIN — DORZOLAMIDE HYDROCHLORIDE 1 DROP(S): 20 SOLUTION/ DROPS OPHTHALMIC at 21:25

## 2019-12-20 RX ADMIN — PIPERACILLIN AND TAZOBACTAM 25 GRAM(S): 4; .5 INJECTION, POWDER, LYOPHILIZED, FOR SOLUTION INTRAVENOUS at 21:25

## 2019-12-20 RX ADMIN — SODIUM CHLORIDE 100 MILLILITER(S): 9 INJECTION, SOLUTION INTRAVENOUS at 18:01

## 2019-12-20 RX ADMIN — Medication 120 MILLIGRAM(S): at 05:22

## 2019-12-20 RX ADMIN — Medication 20 MILLIGRAM(S): at 13:21

## 2019-12-20 RX ADMIN — PIPERACILLIN AND TAZOBACTAM 25 GRAM(S): 4; .5 INJECTION, POWDER, LYOPHILIZED, FOR SOLUTION INTRAVENOUS at 05:21

## 2019-12-20 RX ADMIN — DORZOLAMIDE HYDROCHLORIDE 1 DROP(S): 20 SOLUTION/ DROPS OPHTHALMIC at 13:21

## 2019-12-20 RX ADMIN — HEPARIN SODIUM 1200 UNIT(S)/HR: 5000 INJECTION INTRAVENOUS; SUBCUTANEOUS at 18:43

## 2019-12-20 RX ADMIN — DORZOLAMIDE HYDROCHLORIDE 1 DROP(S): 20 SOLUTION/ DROPS OPHTHALMIC at 05:22

## 2019-12-20 RX ADMIN — HEPARIN SODIUM 12 UNIT(S)/HR: 5000 INJECTION INTRAVENOUS; SUBCUTANEOUS at 05:20

## 2019-12-20 RX ADMIN — LISINOPRIL 40 MILLIGRAM(S): 2.5 TABLET ORAL at 05:22

## 2019-12-20 RX ADMIN — ATORVASTATIN CALCIUM 20 MILLIGRAM(S): 80 TABLET, FILM COATED ORAL at 21:25

## 2019-12-20 RX ADMIN — CHLORHEXIDINE GLUCONATE 1 APPLICATION(S): 213 SOLUTION TOPICAL at 05:22

## 2019-12-20 RX ADMIN — Medication 120 MILLIGRAM(S): at 21:25

## 2019-12-20 RX ADMIN — SENNA PLUS 2 TABLET(S): 8.6 TABLET ORAL at 13:21

## 2019-12-20 RX ADMIN — MORPHINE SULFATE 2 MILLIGRAM(S): 50 CAPSULE, EXTENDED RELEASE ORAL at 22:08

## 2019-12-20 RX ADMIN — MORPHINE SULFATE 2 MILLIGRAM(S): 50 CAPSULE, EXTENDED RELEASE ORAL at 21:38

## 2019-12-20 RX ADMIN — LATANOPROST 1 DROP(S): 0.05 SOLUTION/ DROPS OPHTHALMIC; TOPICAL at 21:27

## 2019-12-20 NOTE — PROGRESS NOTE ADULT - ASSESSMENT
83 y/o F with pancreatic cancer and biliary obstruction who presents with abd pain and constipation. Imaging shows elevated LFTs and dilated bile duct from mass obstruction. Pt has no s/sxs infection (afebrile, no leukocytosis).     -Pancreatic ca with biliary obstruction s/p IR guided PTC, monitor cbc and lfts, monitor PTC outpatient, c/w abx 83 y/o F with pancreatic cancer and biliary obstruction who presents with abd pain and constipation. Imaging shows elevated LFTs and dilated bile duct from mass obstruction. Pt has no s/sxs infection (afebrile, no leukocytosis).     -Pancreatic ca with biliary obstruction s/p IR guided internal external biliary drain wit brush biopsy 12/20/19, monitor cbc and lfts, monitor drain outpatient, c/w abx, follow up brush result 83 y/o F with pancreatic cancer and biliary obstruction who presents with abd pain and constipation. Imaging shows elevated LFTs and dilated bile duct from mass obstruction. Pt has no s/sxs infection (afebrile, no leukocytosis).     -Pancreatic ca with biliary obstruction s/p IR guided internal external biliary drain wit brush biopsy 12/20/19, monitor cbc and lfts, monitor drain outpatient, c/w abx, follow up brush result     Patient not seen by attending as she was down at IR

## 2019-12-20 NOTE — PROGRESS NOTE ADULT - SUBJECTIVE AND OBJECTIVE BOX
ADVANCED stage pancreatic cancer  obstructive jaundice and had worsening of LFTS  today had ext billiary drain done for palliation by IR   pt tolerated procedure v well   no pain,no vomiting   pt awake and alert   wants to pursue active chemo   abdomen soft nn tender

## 2019-12-20 NOTE — PROGRESS NOTE ADULT - ASSESSMENT
mitted with TRANSAMINITIS;INTRACTABLE ABDOMINAL PAIN;ATRIAL FIBRILLATION WITH RVR    HPI:  Patient is an 85 y/o F with PMH of Pancreatic cancer diagnosed last month on chemotherapy gemcitabine and abraxane s/p 1st dose on 12/12/19, A fib on eliquis, DM, hypertension, DLD, colon ca s/p resection presented to the hospital with abdominal pain for 1 week. Patient has been having crampy epigastric pain radiating to the back, 8/10 in intensity, constant, progressively worsening for 1 week. Also had multiple episodes of bilious vomiting, associated with decreased PO intake. C/o chills but no subjective fevers at home. Patient noticed 40lbs weight loss in the last 11 months. She was being worked up for pancreatic mass since May 2019 with 2-3 negative biopsy results but recent biopsy came back positive for pancreatic cancer. She was started on chemo, first dose received last Thursday by Dr. Vela. She went to see Dr. Vela in her office today as the pain was worsening, she had blood work that showed elevated LFTs so she was sent to the hospital. Patient has family history of pancreatic cancer, in niece. Patient was a smoker for 30yrs, used to smoke 1/2pc/day but stopped 20yrs ago.  In ED, /92mm Hg, /min, 97.9F. LFTs showed , , Alkaline phosphatase 1416, total bilirubin 5.9, lipase 6. CT abdomen w and w/o contrast showed no evidence of intestinal obstruction, marked intrahepatic bile duct dilatation and dilatation of the common hepatic duct down to the level of the pancreatic mass. Patient was in A fib with RVR, s/p 10mg cardizem IV and 60mg PO cardizem.    PROBLEMS  1. SIRS (T<96.8F,  Pulse>90, wbc<4)    New problem with additional W/U   acute illness with systemic symptoms     2.  Suspected cholangitis in pt with pancreatic CA vs tumor related biliary obstruction  AP 1416,  to 1123/152 (improving)  CT: The previously noted mass involving the pancreatic head and body measuring 5.6 x 6.9 cm on the previous PET/CT scan, now measures 10.6 x 8.4 cm. Marked intrahepatic bile duct dilatation and dilatation of the common hepatic duct down to the level of the pancreatic mass.    On piperacillin/tazobactam IVPB.. 3.375 Gram(s) IV Intermittent every 8 hours    3. Malnutrition  40lbs weight loss   BMI<19  BMI (kg/m2): 18.9 (12-18-19 @ 16:18)    4. Leukopenia improved    PLAN  - Await blood culture  Send urine culture  - Continue Zosyn  PTC, biliary catheter placement, and brush biopsy today by IR

## 2019-12-20 NOTE — PROGRESS NOTE ADULT - SUBJECTIVE AND OBJECTIVE BOX
INTERVAL HPI/OVERNIGHT EVENTS:    83 y/o F with pancreatic cancer and biliary obstruction who presents with abd pain and constipation. Imaging shows elevated LFTs and dilated bile duct from mass obstruction. Pt has no s/sxs infection (afebrile, no leukocytosis).      patient wenty to IR for PTC     PAST MEDICAL & SURGICAL HISTORY:  Pancreatic cancer  Anxiety  Hypercholesteremia  Afib  DM (diabetes mellitus)  Acquired cataract  Abnormal serum cholesterol  Generalized OA  Mildly obese  H/O: HTN (hypertension)  History of tonsillectomy  History of cataract surgery  History of colon resection  History of cholecystectomy  H/O hernia repair: abdomen      Home Medications:  atorvastatin 20 mg oral tablet: 1 tab(s) orally once a day (19 Dec 2019 01:13)  dilTIAZem 120 mg oral tablet: 1 tab(s) orally 3 times a day (19 Dec 2019 01:13)  dorzolamide 2% ophthalmic solution: 1 drop(s) to each affected eye 3 times a day (19 Dec 2019 01:13)  Eliquis 5 mg oral tablet: 1 tab(s) orally 2 times a day (19 Dec 2019 01:13)  FLUoxetine 20 mg oral capsule: 1 cap(s) orally once a day (19 Dec 2019 01:13)  fosinopril 40 mg oral tablet: 1 tab(s) orally once a day (19 Dec 2019 01:13)  latanoprost 0.005% ophthalmic solution: 1 drop(s) to each affected eye once a day (in the evening) (19 Dec 2019 01:13)  metFORMIN 500 mg oral tablet: 1 tab(s) orally 2 times a day (19 Dec 2019 01:13)  repaglinide 0.5 mg oral tablet: 1 tab(s) orally 2 times a day (before meals) (19 Dec 2019 01:13)      MEDICATIONS  (STANDING):  atorvastatin 20 milliGRAM(s) Oral at bedtime  chlorhexidine 4% Liquid 1 Application(s) Topical <User Schedule>  dextrose 5% + sodium chloride 0.45%. 1000 milliLiter(s) (100 mL/Hr) IV Continuous <Continuous>  diltiazem    Tablet 120 milliGRAM(s) Oral three times a day  dorzolamide 2% Ophthalmic Solution 1 Drop(s) Left EYE three times a day  FLUoxetine 20 milliGRAM(s) Oral daily  heparin  Infusion 900 Unit(s)/Hr (12 mL/Hr) IV Continuous <Continuous>  influenza   Vaccine 0.5 milliLiter(s) IntraMuscular once  latanoprost 0.005% Ophthalmic Solution 1 Drop(s) Right EYE at bedtime  lisinopril 40 milliGRAM(s) Oral daily  piperacillin/tazobactam IVPB.. 3.375 Gram(s) IV Intermittent every 8 hours  senna 2 Tablet(s) Oral daily    MEDICATIONS  (PRN):  morphine  - Injectable 2 milliGRAM(s) IV Push every 6 hours PRN Severe Pain (7 - 10)      Allergies    No Known Allergies    Intolerances        Review of systems  General: mild fatigue   Skin: no new rash   Ophtalmologic: no new visual changes   Respiratory: no new shortness of breath   Cardiovascular: no new chest pain   Gastrointestinal: as per H&P   Genitourinary: no new dysuria   Neurological: no new weakness   otherwise as described above     PHYSICAL EXAM:   Vital Signs:  Vital Signs Last 24 Hrs  T(C): 35.6 (20 Dec 2019 12:46), Max: 36.1 (19 Dec 2019 16:09)  T(F): 96 (20 Dec 2019 12:46), Max: 97 (19 Dec 2019 16:09)  HR: 91 (20 Dec 2019 12:46) (74 - 106)  BP: 127/63 (20 Dec 2019 12:46) (102/57 - 127/63)  BP(mean): --  RR: 18 (20 Dec 2019 12:46) (17 - 18)  SpO2: 96% (20 Dec 2019 08:33) (94% - 98%)  Daily Height in cm: 160.02 (20 Dec 2019 08:33)    Daily Weight in k.2 (19 Dec 2019 23:54)    GENERAL:  no distress  SKIN: no new changes   HEENT:  NC/AT,  anicteric  CHEST:   no new increased effort, breath sounds clear  HEART:  Regular rhythm  ABDOMEN:  Soft, positive bowel sounds, non-tender, no distension, rigidity, rebound, guarding, ascites   EXTREMITIES:  no new cyanosis  PSYCHIATRIC: no change       LABS:                        11.2   4.80  )-----------( 153      ( 20 Dec 2019 04:30 )             32.4       Hemoglobin: 11.2 g/dL (19 @ 04:30)  Hemoglobin: 11.7 g/dL (19 @ 05:48)  Hemoglobin: 12.9 g/dL (19 @ 16:10)          137  |  100  |  8<L>  ----------------------------<  210<H>  3.2<L>   |  21  |  <0.5<L>    Ca    8.1<L>      20 Dec 2019 04:30  Mg     1.8         TPro  4.9<L>  /  Alb  2.8<L>  /  TBili  2.6<H>  /  DBili  x   /  AST  152<H>  /  ALT  130<H>  /  AlkPhos  1123<H>        INR: 1.31 ratio (19 @ 05:48)  INR: 1.27 ratio (19 @ 16:10)      Alanine Aminotransferase (ALT/SGPT): 130 U/L (19 @ 04:30)  Alkaline Phosphatase, Serum: 1123 U/L (19 @ 04:30)  Aspartate Aminotransferase (AST/SGOT): 152 U/L (19 @ 04:30)  Aspartate Aminotransferase (AST/SGOT): 403 U/L (19 @ 05:48)  Alanine Aminotransferase (ALT/SGPT): 205 U/L (19 @ 05:48)  Alkaline Phosphatase, Serum: 1400 U/L (19 @ 05:48)  Alkaline Phosphatase, Serum: 1416 U/L (19 @ 16:10)  Bilirubin Direct, Serum: 5.4 mg/dL (19 @ 16:10)  Aspartate Aminotransferase (AST/SGOT): 500 U/L (19 @ 16:10)  Alanine Aminotransferase (ALT/SGPT): 213 U/L (19 @ 16:10)            RADIOLOGY & ADDITIONAL TESTS: INTERVAL HPI/OVERNIGHT EVENTS:    85 y/o F with pancreatic cancer and biliary obstruction who presents with abd pain and constipation. Imaging shows elevated LFTs and dilated bile duct from mass obstruction. Pt has no s/sxs infection (afebrile, no leukocytosis).      patient went to IR for ext internal biliary drain and brush biopsy, patient feels significantly better, denies abd pain       PAST MEDICAL & SURGICAL HISTORY:  Pancreatic cancer  Anxiety  Hypercholesteremia  Afib  DM (diabetes mellitus)  Acquired cataract  Abnormal serum cholesterol  Generalized OA  Mildly obese  H/O: HTN (hypertension)  History of tonsillectomy  History of cataract surgery  History of colon resection  History of cholecystectomy  H/O hernia repair: abdomen      Home Medications:  atorvastatin 20 mg oral tablet: 1 tab(s) orally once a day (19 Dec 2019 01:13)  dilTIAZem 120 mg oral tablet: 1 tab(s) orally 3 times a day (19 Dec 2019 01:13)  dorzolamide 2% ophthalmic solution: 1 drop(s) to each affected eye 3 times a day (19 Dec 2019 01:13)  Eliquis 5 mg oral tablet: 1 tab(s) orally 2 times a day (19 Dec 2019 01:13)  FLUoxetine 20 mg oral capsule: 1 cap(s) orally once a day (19 Dec 2019 01:13)  fosinopril 40 mg oral tablet: 1 tab(s) orally once a day (19 Dec 2019 01:13)  latanoprost 0.005% ophthalmic solution: 1 drop(s) to each affected eye once a day (in the evening) (19 Dec 2019 01:13)  metFORMIN 500 mg oral tablet: 1 tab(s) orally 2 times a day (19 Dec 2019 01:13)  repaglinide 0.5 mg oral tablet: 1 tab(s) orally 2 times a day (before meals) (19 Dec 2019 01:13)      MEDICATIONS  (STANDING):  atorvastatin 20 milliGRAM(s) Oral at bedtime  chlorhexidine 4% Liquid 1 Application(s) Topical <User Schedule>  dextrose 5% + sodium chloride 0.45%. 1000 milliLiter(s) (100 mL/Hr) IV Continuous <Continuous>  diltiazem    Tablet 120 milliGRAM(s) Oral three times a day  dorzolamide 2% Ophthalmic Solution 1 Drop(s) Left EYE three times a day  FLUoxetine 20 milliGRAM(s) Oral daily  heparin  Infusion 900 Unit(s)/Hr (12 mL/Hr) IV Continuous <Continuous>  influenza   Vaccine 0.5 milliLiter(s) IntraMuscular once  latanoprost 0.005% Ophthalmic Solution 1 Drop(s) Right EYE at bedtime  lisinopril 40 milliGRAM(s) Oral daily  piperacillin/tazobactam IVPB.. 3.375 Gram(s) IV Intermittent every 8 hours  senna 2 Tablet(s) Oral daily    MEDICATIONS  (PRN):  morphine  - Injectable 2 milliGRAM(s) IV Push every 6 hours PRN Severe Pain (7 - 10)      Allergies    No Known Allergies    Intolerances        Review of systems  General: mild fatigue   Skin: no new rash   Ophtalmologic: no new visual changes   Respiratory: no new shortness of breath   Cardiovascular: no new chest pain   Gastrointestinal: as per H&P   Genitourinary: no new dysuria   Neurological: no new weakness   otherwise as described above     PHYSICAL EXAM:   Vital Signs:  Vital Signs Last 24 Hrs  T(C): 35.6 (20 Dec 2019 12:46), Max: 36.1 (19 Dec 2019 16:09)  T(F): 96 (20 Dec 2019 12:46), Max: 97 (19 Dec 2019 16:09)  HR: 91 (20 Dec 2019 12:46) (74 - 106)  BP: 127/63 (20 Dec 2019 12:46) (102/57 - 127/63)  BP(mean): --  RR: 18 (20 Dec 2019 12:46) (17 - 18)  SpO2: 96% (20 Dec 2019 08:33) (94% - 98%)  Daily Height in cm: 160.02 (20 Dec 2019 08:33)    Daily Weight in k.2 (19 Dec 2019 23:54)    GENERAL:  no distress  SKIN: no new changes   HEENT:  NC/AT,  anicteric  CHEST:   no new increased effort, breath sounds clear  HEART:  Regular rhythm  ABDOMEN:  Soft, positive bowel sounds, right biliary drain, non tender, no distension, rigidity, rebound, guarding, ascites   EXTREMITIES:  no new cyanosis  PSYCHIATRIC: no change       LABS:                        11.2   4.80  )-----------( 153      ( 20 Dec 2019 04:30 )             32.4       Hemoglobin: 11.2 g/dL (19 @ 04:30)  Hemoglobin: 11.7 g/dL (19 @ 05:48)  Hemoglobin: 12.9 g/dL (19 @ 16:10)          137  |  100  |  8<L>  ----------------------------<  210<H>  3.2<L>   |  21  |  <0.5<L>    Ca    8.1<L>      20 Dec 2019 04:30  Mg     1.8         TPro  4.9<L>  /  Alb  2.8<L>  /  TBili  2.6<H>  /  DBili  x   /  AST  152<H>  /  ALT  130<H>  /  AlkPhos  1123<H>        INR: 1.31 ratio (19 @ 05:48)  INR: 1.27 ratio (19 @ 16:10)      Alanine Aminotransferase (ALT/SGPT): 130 U/L (19 @ 04:30)  Alkaline Phosphatase, Serum: 1123 U/L (19 @ 04:30)  Aspartate Aminotransferase (AST/SGOT): 152 U/L (19 @ 04:30)  Aspartate Aminotransferase (AST/SGOT): 403 U/L (19 @ 05:48)  Alanine Aminotransferase (ALT/SGPT): 205 U/L (19 @ 05:48)  Alkaline Phosphatase, Serum: 1400 U/L (19 @ 05:48)  Alkaline Phosphatase, Serum: 1416 U/L (19 @ 16:10)  Bilirubin Direct, Serum: 5.4 mg/dL (19 @ 16:10)  Aspartate Aminotransferase (AST/SGOT): 500 U/L (19 @ 16:10)  Alanine Aminotransferase (ALT/SGPT): 213 U/L (19 @ 16:10)            RADIOLOGY & ADDITIONAL TESTS:

## 2019-12-20 NOTE — PROGRESS NOTE ADULT - SUBJECTIVE AND OBJECTIVE BOX
ZHAO KUMAR  84y, Female  Allergy: No Known Allergies      CHIEF COMPLAINT: Abdominal pain (20 Dec 2019 10:16)      INTERVAL EVENTS/HPI  - No acute events overnight  - T(F): , Max: 97 (12-19-19 @ 16:09)  - Denies any worsening symptoms  - Tolerating medication  - WBC Count: 4.80 K/uL (12-20-19 @ 04:30)      ROS  General: Denies fevers, chills, nightsweats, weight loss  HEENT: Denies headache, rhinorrhea, sore throat, eye pain  CV: Denies CP, palpitations  PULM: Denies SOB, cough  GI: Denies abdominal pain, diarrhea  : Denies dysuria, hematuria  MSK: Denies arthralgias  SKIN: Denies rash   NEURO: Denies paresthesias, weakness  PSYCH: Denies depression    FH non-contributory   Social Hx non-contributory    VITALS:  T(F): 96, Max: 97 (12-19-19 @ 16:09)  HR: 104  BP: 119/58  RR: 17Vital Signs Last 24 Hrs  T(C): 35.6 (20 Dec 2019 08:33), Max: 36.1 (19 Dec 2019 16:09)  T(F): 96 (20 Dec 2019 06:24), Max: 97 (19 Dec 2019 16:09)  HR: 104 (20 Dec 2019 08:33) (74 - 106)  BP: 119/58 (20 Dec 2019 08:33) (102/57 - 119/58)  BP(mean): --  RR: 17 (20 Dec 2019 08:33) (17 - 18)  SpO2: 96% (20 Dec 2019 08:33) (94% - 98%)    PHYSICAL EXAM:  Gen: NAD, resting in bed  HEENT: Normocephalic, atraumatic  Neck: supple, no lymphadenopathy  CV: Regular rate & regular rhythm  Lungs: decreased BS at bases, no fremitus  Abdomen: Soft, BS present  Ext: Warm, well perfused  Neuro: non focal, awake  Skin: no rash, no erythema      TESTS & MEASUREMENTS:                        11.2   4.80  )-----------( 153      ( 20 Dec 2019 04:30 )             32.4     12-20    137  |  100  |  8<L>  ----------------------------<  210<H>  3.2<L>   |  21  |  <0.5<L>    Ca    8.1<L>      20 Dec 2019 04:30  Mg     1.8     12-19    TPro  4.9<L>  /  Alb  2.8<L>  /  TBili  2.6<H>  /  DBili  x   /  AST  152<H>  /  ALT  130<H>  /  AlkPhos  1123<H>  12-20    eGFR if Non : 107 mL/min/1.73M2 (12-20-19 @ 04:30)  eGFR if African American: 124 mL/min/1.73M2 (12-20-19 @ 04:30)    LIVER FUNCTIONS - ( 20 Dec 2019 04:30 )  Alb: 2.8 g/dL / Pro: 4.9 g/dL / ALK PHOS: 1123 U/L / ALT: 130 U/L / AST: 152 U/L / GGT: x                 Lactate, Blood: 1.5 mmol/L (12-18-19 @ 16:10)      INFECTIOUS DISEASES TESTING      RADIOLOGY & ADDITIONAL TESTS:  I have personally reviewed the last Chest xray  CXR  Xray Chest 1 View AP/PA:   EXAM:  XR CHEST FRONTAL 1V            PROCEDURE DATE:  12/18/2019            INTERPRETATION:  Clinical History / Reason for exam: Cough.    Comparison : Chest radiograph 1/7/2012.    Technique/Positioning: AP view the chest..    Findings:    Support devices: None.    Cardiac/mediastinum/hilum: Unremarkable.    Lung parenchyma/Pleura: Within normal limits.    Skeleton/soft tissues: Unremarkable.    Impression:      No radiographic evidence of acute cardiopulmonary disease.                  FERMIN MAYORGA M.D., ATTENDING RADIOLOGIST  This document has been electronically signed. Dec 19 2019  8:53AM             (12-18-19 @ 16:11)      CT  CT Abdomen and Pelvis w/ Oral Cont and w/ IV Cont:   EXAM:  CT ABDOMEN AND PELVIS OC IC            PROCEDURE DATE:  12/18/2019            INTERPRETATION:  CLINICAL STATEMENT: Diffuse abdominal pain; jaundice; pancreatic cancer    TECHNIQUE: Contiguous axial CT images were obtained from the lower chest to the pubic symphysis with oral and intravenous contrast.   Reformatted images in the coronal and sagittal planes were acquired.    COMPARISON CT: PET/CT dated 10/22/2019    OTHER STUDIES USED FOR CORRELATION: None.         FINDINGS    LOWER CHEST:The lung bases are clear. No pleural effusion.     HEPATIOBILIARY: There is marked intrahepatic bile duct dilatation and dilatation of the common hepatic duct down to the level of the large mass arising from the head and body of the pancreas.  The gallbladder is not identified.    SPLEEN: Unremarkable.        PANCREAS: The previously noted mass involving the pancreatic head and body measuring 5.6 x 6.9 cm on the previous PET/CT scan, now measures 10.6 x 8.4 cm and displays inhomogeneous contrast enhancement. If adenopathy is noted in the peripancreatic region. The pancreatic duct is dilated in the region of the tail.    The extrahepatic portal vein and splenic vein are not identified, compatible with compression and/or invasion by the pancreatic mass. The bifurcation of the portal vein in the hepatic hilum is visualized but is of small caliber, and may be filling through collaterals.      ADRENAL GLANDS: Unremarkable.        KIDNEYS: No evidence of hydronephrosis, calcified stones, or solid mass.    ABDOMINOPELVIC NODES: Unremarkable.    PELVIC ORGANS: No evidence of pelvic mass, lymphadenopathy, or fluid collection..        PERITONEUM/MESENTERY/BOWEL: An abdominal wall hernia is noted in the right side of the abdomen. Surgical clips,compatible with previous hernia repair, are noted. No evidence of obstruction or ascites within the abdomen or pelvis.     BONES/SOFT TISSUES: Degenerative changes of the spine are noted.        OTHER:   Vascular calcifications are noted with no evidence of abdominal aortic aneurysm.      IMPRESSION: The previously noted mass involving the pancreatic head and body measuring 5.6 x 6.9 cm on the previous PET/CT scan, now measures 10.6 x 8.4 cm. Marked intrahepatic bile duct dilatation and dilatation of the common hepatic duct down to the level of the pancreatic mass.                    DEBBIE BECKER M.D., ATTENDING RADIOLOGIST  This document has been electronically signed. Dec 18 2019  6:34PM             (12-18-19 @ 17:57)      CARDIOLOGY TESTING  12 Lead ECG:   Ventricular Rate 122 BPM    Atrial Rate 122 BPM    QRS Duration 84 ms    Q-T Interval 312 ms    QTC Calculation(Bezet) 444 ms    R Axis 57 degrees    T Axis 60 degrees    Diagnosis Line Atrial fibrillation with rapid ventricular response  ST & T wave abnormality, consider anterolateral ischemia  Abnormal ECG    Confirmed by Jacky Gallegos ([id number]) on 12/18/2019 7:02:49 PM (12-18-19 @ 15:23)      MEDICATIONS  atorvastatin 20  chlorhexidine 4% Liquid 1  dextrose 5% + sodium chloride 0.45%. 1000  diltiazem    Tablet 120  dorzolamide 2% Ophthalmic Solution 1  FLUoxetine 20  heparin  Infusion 900  influenza   Vaccine 0.5  latanoprost 0.005% Ophthalmic Solution 1  lisinopril 40  piperacillin/tazobactam IVPB.. 3.375  senna 2      ANTIBIOTICS:  piperacillin/tazobactam IVPB.. 3.375 Gram(s) IV Intermittent every 8 hours      All available historical data has been reviewed

## 2019-12-20 NOTE — PROGRESS NOTE ADULT - SUBJECTIVE AND OBJECTIVE BOX
Interventional Radiology Follow- Up Note    84F with pancreatic mass and biliary obstruction. Feels well overall this morning. Endorses mild pruritis. Otherwise no specific complaints at this time.    Vitals: T(F): 96 (12-20-19 @ 06:24), Max: 97 (12-19-19 @ 16:09)  HR: 104 (12-20-19 @ 08:33) (74 - 106)  BP: 119/58 (12-20-19 @ 08:33) (102/57 - 119/58)  RR: 17 (12-20-19 @ 08:33) (17 - 18)  SpO2: 96% (12-20-19 @ 08:33) (94% - 98%)  Wt(kg): --    LABS:                        11.2   4.80  )-----------( 153      ( 20 Dec 2019 04:30 )             32.4     12-20    137  |  100  |  8<L>  ----------------------------<  210<H>  3.2<L>   |  21  |  <0.5<L>    Ca    8.1<L>      20 Dec 2019 04:30  Mg     1.8     12-19    TPro  4.9<L>  /  Alb  2.8<L>  /  TBili  2.6<H>  /  DBili  x   /  AST  152<H>  /  ALT  130<H>  /  AlkPhos  1123<H>  12-20    PT/INR - ( 19 Dec 2019 05:48 )   PT: 15.00 sec;   INR: 1.31 ratio         PTT - ( 20 Dec 2019 04:30 )  PTT:59.9 sec      PHYSICAL EXAM:  General: Nontoxic, pleasant, conversational, mildly jaundiced, in NAD  CV: normal pulse. extremities warm and well-perfused  Lung: breathing comfortably on room air  Abdomen: soft, nontender, nondistended  Extremities: no pedal edema or calf tenderness noted     Assessment and Plan  84F with a large pancreatic head mass and biliary obstruction. Clinically nontoxic and hemodynamically stable.   - PTC and brush biopsy today     Please call Interventional Radiology [phone] with any questions, concerns, or issues regarding above. Interventional Radiology Follow- Up Note    84F with pancreatic mass and biliary obstruction. Feels well overall this morning. Endorses mild pruritis. Otherwise no specific complaints at this time.    Vitals: T(F): 96 (12-20-19 @ 06:24), Max: 97 (12-19-19 @ 16:09)  HR: 104 (12-20-19 @ 08:33) (74 - 106)  BP: 119/58 (12-20-19 @ 08:33) (102/57 - 119/58)  RR: 17 (12-20-19 @ 08:33) (17 - 18)  SpO2: 96% (12-20-19 @ 08:33) (94% - 98%)  Wt(kg): --    LABS:                        11.2   4.80  )-----------( 153      ( 20 Dec 2019 04:30 )             32.4     12-20    137  |  100  |  8<L>  ----------------------------<  210<H>  3.2<L>   |  21  |  <0.5<L>    Ca    8.1<L>      20 Dec 2019 04:30  Mg     1.8     12-19    TPro  4.9<L>  /  Alb  2.8<L>  /  TBili  2.6<H>  /  DBili  x   /  AST  152<H>  /  ALT  130<H>  /  AlkPhos  1123<H>  12-20    PT/INR - ( 19 Dec 2019 05:48 )   PT: 15.00 sec;   INR: 1.31 ratio         PTT - ( 20 Dec 2019 04:30 )  PTT:59.9 sec      PHYSICAL EXAM:  General: Nontoxic, pleasant, conversational, mildly jaundiced, in NAD  CV: normal pulse. extremities warm and well-perfused  Lung: breathing comfortably on room air  Abdomen: soft, nontender, nondistended  Extremities: no pedal edema or calf tenderness noted     Assessment and Plan  84F with a large pancreatic head mass and biliary obstruction. Clinically nontoxic and hemodynamically stable.   - PTC, biliary catheter placement, and brush biopsy today     Please call Interventional Radiology [phone] with any questions, concerns, or issues regarding above.

## 2019-12-20 NOTE — PROGRESS NOTE ADULT - SUBJECTIVE AND OBJECTIVE BOX
HOSPITALIST ATTENDING NOTE    STACY, VIRGINIA  84y Female  [id number]    INTERVAL HPI/OVERNIGHT EVENTS: sp PTC today with relief of pain    T(C): 35.6 (12-20-19 @ 12:46), Max: 36.1 (12-19-19 @ 16:09)  HR: 91 (12-20-19 @ 12:46) (74 - 106)  BP: 127/63 (12-20-19 @ 12:46) (102/57 - 127/63)  RR: 18 (12-20-19 @ 12:46) (17 - 18)  SpO2: 96% (12-20-19 @ 08:33) (94% - 96%)  Wt(kg): --        nad  aaox3  elderly  left perorb ecchy  [id number]  ctabl  +PTC    Consultant(s) Notes Reviewed:  [x ] YES  [ ] NO  Care Discussed with Consultants/Other Providers/ Housestaff [ x] YES  [ ] NO    LABS:                        11.2   4.80  )-----------( 153      ( 20 Dec 2019 04:30 )             32.4     12-20    137  |  100  |  8<L>  ----------------------------<  210<H>  3.2<L>   |  21  |  <0.5<L>    Ca    8.1<L>      20 Dec 2019 04:30  Mg     1.8     12-19    TPro  4.9<L>  /  Alb  2.8<L>  /  TBili  2.6<H>  /  DBili  x   /  AST  152<H>  /  ALT  130<H>  /  AlkPhos  1123<H>  12-20          RADIOLOGY & ADDITIONAL TESTS:    Imaging or report Personally Reviewed:  [ ] YES  [ ] NO    Case discussed with resident    Care discussed with pt/family      HEALTH ISSUES - PROBLEM Dx:

## 2019-12-20 NOTE — PROGRESS NOTE ADULT - ASSESSMENT
pt on antibiotics for cholangitis  lfts to be monitered closely   need picc line or port before discharge for chemo  will follow     mikey simon MD  [phone]

## 2019-12-20 NOTE — PRE-ANESTHESIA EVALUATION ADULT - NSANTHADDINFOFT_GEN_ALL_CORE
General anesthesia. discussed with the patients daughter and the patient all the risks, benefits, alternatives, complications. all questions answered. willing to proceed

## 2019-12-20 NOTE — PROGRESS NOTE ADULT - SUBJECTIVE AND OBJECTIVE BOX
INTERVENTIONAL RADIOLOGY BRIEF-OPERATIVE NOTE    Procedure: PTC with external-internal biliary drain and brush biopsy    Pre-Op Diagnosis: biliary obstruction    Post-Op Diagnosis: none    Attending: Amari Clay MD  Resident: Brett Dash MD    Anesthesia (type):  [x] General Anesthesia  [ ] Sedation  [ ] Spinal Anesthesia  [x] Local/Regional    Contrast: None    Estimated Blood Loss: Minimal, < 5 cc    Condition:   [ ] Critical  [ ] Serious  [ ] Fair   [x] Good    Findings:   - Percutaneous transhepatic cholangiogram demonstrated significant bilobar intrahepatic biliary ductal dilatation and severe narrowing at the proximal common hepatic duct.   - Brush biopsy at site of severe ductal narrowing performed.   - Placement of an 8F external-internal biliary drain.    Specimens Removed: none.    Implants: none.    Complications: none immediate.    Disposition and Plan of Care:   - Return to previous level of care.   - External biliary drain to gravity, monitor output. Transient increase in bilirubinemia is expected post-procedure. Will follow up for possible internalization in the future.   - Follow up brush biopsy.    Please call Interventional Radiology [phone] with any questions, concerns, or issues. INTERVENTIONAL RADIOLOGY BRIEF-OPERATIVE NOTE    Procedure: PTC with 8Fr external-internal biliary drain and brush biopsy    Pre-Op Diagnosis: biliary obstruction    Post-Op Diagnosis: none    Attending: Amari Clay MD  Resident: Brett Dash MD    Anesthesia (type):  [x] General Anesthesia  [ ] Sedation  [ ] Spinal Anesthesia  [x] Local/Regional    Contrast: None    Estimated Blood Loss: Minimal, < 5 cc    Condition:   [ ] Critical  [ ] Serious  [ ] Fair   [x] Good    Findings:   - Percutaneous transhepatic cholangiogram demonstrated significant bilobar intrahepatic biliary ductal dilatation and severe narrowing at the proximal common hepatic duct.   - Brush biopsy at site of severe ductal narrowing performed.   - Placement of an 8F external-internal biliary drain.    Specimens Removed: 20ml bile- sent for c/s and cytology.    Implants: none.    Complications: none immediate.    Disposition and Plan of Care:   - Return to previous level of care.   - External biliary drain to gravity, monitor output. Transient increase in bilirubinemia is expected post-procedure. Will follow up for possible internalization in the future.   - Follow up brush biopsy.    Please call Interventional Radiology [phone] with any questions, concerns, or issues.

## 2019-12-20 NOTE — PROGRESS NOTE ADULT - ASSESSMENT
Patient is an 83 y/o F with PMH of Pancreatic cancer diagnosed last month on chemotherapy gemcitabine and abraxane s/p 1st dose on 12/12/19, A fib on eliquis, DM, hypertension, DLD, colon ca s/p resection presented to the hospital with abdominal pain for 1 week. Patient has been having crampy epigastric pain radiating to the back, 8/10 in intensity, constant, progressively worsening for 1 week. Also had multiple episodes of bilious vomiting, associated with decreased PO intake. C/o chills but no subjective fevers at home. Patient noticed 40lbs weight loss in the last 11 months. She was being worked up for pancreatic mass since May 2019 with 2-3 negative biopsy results but recent biopsy came back positive for pancreatic cancer. She was started on chemo, first dose received last Thursday by . She went to see  in her office today as the pain was worsening, she had blood work that showed elevated LFTs so she was sent to the hospital. Patient has family history of pancreatic cancer, in niece. Patient was a smoker for 30yrs, used to smoke 1/2pc/day but stopped 20yrs ago.  In ED, /92mm Hg, /min, 97.9F. LFTs showed , , Alkaline phosphatase 1416, total bilirubin 5.9, lipase 6. CT abdomen w and w/o contrast showed no evidence of intestinal obstruction, mrked intrahepatic bile duct dilatation and dilatation of the common hepatic duct down to the level of the pancreatic mass. Patient was in A fib with RVR, s/p 10mg cardizem IV and 60mg PO cardizem.      #pancreatic CA sp 1 round of chemo - will need port or picc  #obstructive jaundice due to mass - sp PTC - monitor lfts  #suspected cholangitis - on zosyn per IR, follow up cultures  #afib rate controlled on IV heparin - monitro PTT 50 -80      discussed with son at bedside - aware of tx and plan  GOC discussion done - full code - still wants all active treatment  aware of poor prognosis

## 2019-12-20 NOTE — MEDICAL STUDENT PROGRESS NOTE(EDUCATION) - SUBJECTIVE AND OBJECTIVE BOX
ZHAO KUMAR 84y Female  MRN#: [id number]   Hospital Day: 2d    SUBJECTIVE  Patient is a 84y old Female w/ pmhx of pancreatic CA dx last month on chemotherapy (gemcitabine and abraxane), a.fib on eliquis, DM, HTN, DLD, and colon CA s/p resection who presents with a chief complaint of over 1 week duration worsening, crampy, RUQ, 8/10, constant, RUQ and epigastric abd pain that radiated into her back with associated bilious vomiting, decreased PO intake, and chill, but no fevers. The patient went to see her oncologist, Dr. Vela two days ago and was informed that blood tests showed elevated LFTs. She was sent to the ED and it was discovered that she had AST-500, and ALT- 213, alk phos - 1416, direct bili - 5.4 (total 5.9), and lipase at 6. CT showed no intestinal obstruction, but marked intrahepatic bile duct dilation, and dilation of the common hepatic duct down to the level of the 10.6 x 8.4 cm mass. Pt was also in a.fib with RVR with a HR of 122. She was given 10 mg cardizem IV and 60 mg PO cardizem.     She is currently admitted to medicine with the primary diagnosis of Transaminitis 2/2 to obstruction by pancreatic mass. Today is hospital day 2.     INTERVAL HPI AND OVERNIGHT EVENTS:  Patient was examined and seen at bedside. This morning she is resting comfortably in bed and reports no issues or overnight events. Reports diarrhea last night, but says it has since resolved and denies any other complaints.  Patient was made NPO at midnight for PTC w/ IR this morning.     REVIEW OF SYMPTOMS:  CONSTITUTIONAL: No weakness, fevers or chills; No headaches  EYES: No visual changes, eye pain, or discharge  ENT: No vertigo; No ear pain or change in hearing; No sore throat or difficulty swallowing  NECK: No pain or stiffness  RESPIRATORY: No cough, wheezing, or hemoptysis; No shortness of breath  CARDIOVASCULAR: No chest pain or palpitations  GASTROINTESTINAL: + RUQ abdominal/epigastric pain; No nausea, vomiting, or hematemesis; + diarrhea   GENITOURINARY: No dysuria, frequency or hematuria  MUSCULOSKELETAL: No joint pain, no muscle pain, no weakness  NEUROLOGICAL: No numbness or weakness  SKIN: No itching or rashes    OBJECTIVE  PAST MEDICAL & SURGICAL HISTORY  Pancreatic cancer  Anxiety  Hypercholesteremia  Afib  DM (diabetes mellitus)  Acquired cataract  Abnormal serum cholesterol  Generalized OA  Mildly obese  H/O: HTN (hypertension)  History of tonsillectomy  History of cataract surgery  History of colon resection  History of cholecystectomy  H/O hernia repair: abdomen    ALLERGIES:  No Known Allergies    MEDICATIONS:  STANDING MEDICATIONS  atorvastatin 20 milliGRAM(s) Oral at bedtime  chlorhexidine 4% Liquid 1 Application(s) Topical <User Schedule>  dextrose 5% + sodium chloride 0.45%. 1000 milliLiter(s) IV Continuous <Continuous>  diltiazem    Tablet 120 milliGRAM(s) Oral three times a day  dorzolamide 2% Ophthalmic Solution 1 Drop(s) Left EYE three times a day  FLUoxetine 20 milliGRAM(s) Oral daily  heparin  Infusion 900 Unit(s)/Hr IV Continuous <Continuous>  influenza   Vaccine 0.5 milliLiter(s) IntraMuscular once  latanoprost 0.005% Ophthalmic Solution 1 Drop(s) Right EYE at bedtime  lisinopril 40 milliGRAM(s) Oral daily  piperacillin/tazobactam IVPB.. 3.375 Gram(s) IV Intermittent every 8 hours  senna 2 Tablet(s) Oral daily    PRN MEDICATIONS  morphine  - Injectable 2 milliGRAM(s) IV Push every 6 hours PRN      VITAL SIGNS: Last 24 Hours  T(C): 35.6 (20 Dec 2019 08:33), Max: 36.1 (19 Dec 2019 16:09)  T(F): 96 (20 Dec 2019 06:24), Max: 97 (19 Dec 2019 16:09)  HR: 104 (20 Dec 2019 08:33) (74 - 106)  BP: 119/58 (20 Dec 2019 08:33) (102/57 - 119/58)  BP(mean): --  RR: 17 (20 Dec 2019 08:33) (17 - 18)  SpO2: 96% (20 Dec 2019 08:33) (94% - 98%)    LABS:                        11.2   4.80  )-----------( 153      ( 20 Dec 2019 04:30 )             32.4     12-19    136  |  98  |  12  ----------------------------<  192<H>  3.9   |  22  |  <0.5<L>    Ca    8.5      19 Dec 2019 05:48  Mg     1.8     12-19    TPro  5.2<L>  /  Alb  3.1<L>  /  TBili  5.4<H>  /  DBili  x   /  AST  403<H>  /  ALT  205<H>  /  AlkPhos  1400<H>  12-19    PT/INR - ( 19 Dec 2019 05:48 )   PT: 15.00 sec;   INR: 1.31 ratio         PTT - ( 20 Dec 2019 04:30 )  PTT:59.9 sec          CARDIAC MARKERS ( 18 Dec 2019 22:30 )  x     / <0.01 ng/mL / x     / x     / x          RADIOLOGY:  12/18 CT Abd/Pelvis - IMPRESSION: The previously noted mass involving the pancreatic head and body measuring 5.6 x 6.9 cm on the previous PET/CT scan, now measures 10.6 x 8.4 cm. Marked intrahepatic bile duct dilatation and dilatation of the common hepatic duct down to the level of the pancreatic mass.    PHYSICAL EXAM:  CONSTITUTIONAL: No acute distress, well-developed, well-groomed, AAOx3  HEAD: Atraumatic, normocephalic  EYES: EOM intact, conjunctiva and sclera clear, no sclera icterus  ENT: Supple, no masses, no JVD; moist mucous membranes  PULMONARY: Clear to auscultation bilaterally; no wheezes, rales, or rhonchi  CARDIOVASCULAR: Regular rate and rhythm; no murmurs, rubs, or gallops  GASTROINTESTINAL: Soft, mildly TTP at RUQ, but all other quadrants non-tender, non-distended; bowel sounds present. Incision scars noted  MUSCULOSKELETAL: 2+ peripheral pulses; no clubbing, no cyanosis, no edema  NEUROLOGY: non-focal  SKIN: No rashes or lesions; warm and dry    ASSESSMENT & PLAN  ASSESSMENT AND PLAN:  Patient is a 84y old Female w/ pmhx of pancreatic CA dx last month on chemotherapy (gemcitabine and abraxane), a.fib on eliquis, DM, HTN, DLD, and colon CA s/p resection who presents with a chief complaint of over 1 week duration of worsening, crampy, RUQ, 8/10, constant, RUQ and epigastric abd pain that radiated into her back with associated bilious vomiting, decreased PO intake, and chill, but no fevers. She is currently admitted to medicine with the primary diagnosis of Transaminitis 2/2 to obstruction by pancreatic mass.    #Transaminitis 2/2 to obstruction  - pancreatic cancer diagnosis last month with elevated LFTs at outpt onc office  - LFTs(12/18 in ED), AST - 500 , ALT - 213, alk phos - 1416, improved today (12/20)  AST - 152 , ALT - 130, alk phos - 1123  -CT abdomen showed 10.6 x 8.4 cm mass involving the 10.6 x 8.4 cm pancreatic head and body w/marked intrahepatic bile duct dilatation and dilatation of the common hepatic duct down to the level of the pancreatic mass  -Sent for PTC w/ IR this morning  - Low WBC, but wnl today - 4.80   -Pending blood cultures  -Started on Zosyn 3.75 q6h, and IV fluids NS @75cc/hr   -c/w Morphine PRN for pain  -GI rec bedside abd plain film    #Atrial fibrillation  -c/w cardizem 120mg TID PO  - IR stopped Heparin gtt, will restart following procedure  - Monitor w/telemetry    #Pancreatic cancer  -On gemcitabine and abraxane  - Rec for picc or port to be placed with IR  -s/p 1 cycle of chemo on 12/12  -Hem onc following,  outpatient    #Hypertension  -c/w lisinopril, cardizem    #DM   -Monitor FS and start on insulin if FS > 200    #DLD  -c/w atorvastatin    #diet: NPO  #DVT ppx: SCD   #GI ppx: protonix  #Dispo: D/C following PTC w/ outpt f/u and chemo with Dr. Vela ZHAO KUMAR 84y Female  MRN#: [id number]   Hospital Day: 2d    SUBJECTIVE  Patient is a 84y old Female w/ pmhx of pancreatic CA dx last month on chemotherapy (gemcitabine and abraxane), a.fib on eliquis, DM, HTN, DLD, and colon CA s/p resection who presents with a chief complaint of over 1 week duration worsening, crampy, RUQ, 8/10, constant, RUQ and epigastric abd pain that radiated into her back with associated bilious vomiting, decreased PO intake, and chill, but no fevers. The patient went to see her oncologist, Dr. Vela two days ago and was informed that blood tests showed elevated LFTs. She was sent to the ED and it was discovered that she had AST-500, and ALT- 213, alk phos - 1416, direct bili - 5.4 (total 5.9), and lipase at 6. CT showed no intestinal obstruction, but marked intrahepatic bile duct dilation, and dilation of the common hepatic duct down to the level of the 10.6 x 8.4 cm mass. Pt was also in a.fib with RVR with a HR of 122. She was given 10 mg cardizem IV and 60 mg PO cardizem.     She is currently admitted to medicine with the primary diagnosis of Transaminitis 2/2 to obstruction by pancreatic mass. Today is hospital day 2.     INTERVAL HPI AND OVERNIGHT EVENTS:  Patient was examined and seen at bedside. This morning she is resting comfortably in bed and reports no issues or overnight events. Reports diarrhea last night, but says it has since resolved and denies any other complaints.  Patient was made NPO at midnight for PTC w/ IR this morning.     REVIEW OF SYMPTOMS:  CONSTITUTIONAL: No weakness, fevers or chills; No headaches  EYES: No visual changes, eye pain, or discharge  ENT: No vertigo; No ear pain or change in hearing; No sore throat or difficulty swallowing  NECK: No pain or stiffness  RESPIRATORY: No cough, wheezing, or hemoptysis; No shortness of breath  CARDIOVASCULAR: No chest pain or palpitations  GASTROINTESTINAL: + RUQ abdominal/epigastric pain; No nausea, vomiting, or hematemesis; + diarrhea   GENITOURINARY: No dysuria, frequency or hematuria  MUSCULOSKELETAL: No joint pain, no muscle pain, no weakness  NEUROLOGICAL: No numbness or weakness  SKIN: No itching or rashes    OBJECTIVE  PAST MEDICAL & SURGICAL HISTORY  Pancreatic cancer  Anxiety  Hypercholesteremia  Afib  DM (diabetes mellitus)  Acquired cataract  Abnormal serum cholesterol  Generalized OA  Mildly obese  H/O: HTN (hypertension)  History of tonsillectomy  History of cataract surgery  History of colon resection  History of cholecystectomy  H/O hernia repair: abdomen    ALLERGIES:  No Known Allergies    MEDICATIONS:  STANDING MEDICATIONS  atorvastatin 20 milliGRAM(s) Oral at bedtime  chlorhexidine 4% Liquid 1 Application(s) Topical <User Schedule>  dextrose 5% + sodium chloride 0.45%. 1000 milliLiter(s) IV Continuous <Continuous>  diltiazem    Tablet 120 milliGRAM(s) Oral three times a day  dorzolamide 2% Ophthalmic Solution 1 Drop(s) Left EYE three times a day  FLUoxetine 20 milliGRAM(s) Oral daily  heparin  Infusion 900 Unit(s)/Hr IV Continuous <Continuous>  influenza   Vaccine 0.5 milliLiter(s) IntraMuscular once  latanoprost 0.005% Ophthalmic Solution 1 Drop(s) Right EYE at bedtime  lisinopril 40 milliGRAM(s) Oral daily  piperacillin/tazobactam IVPB.. 3.375 Gram(s) IV Intermittent every 8 hours  senna 2 Tablet(s) Oral daily    PRN MEDICATIONS  morphine  - Injectable 2 milliGRAM(s) IV Push every 6 hours PRN      VITAL SIGNS: Last 24 Hours  T(C): 35.6 (20 Dec 2019 08:33), Max: 36.1 (19 Dec 2019 16:09)  T(F): 96 (20 Dec 2019 06:24), Max: 97 (19 Dec 2019 16:09)  HR: 104 (20 Dec 2019 08:33) (74 - 106)  BP: 119/58 (20 Dec 2019 08:33) (102/57 - 119/58)  BP(mean): --  RR: 17 (20 Dec 2019 08:33) (17 - 18)  SpO2: 96% (20 Dec 2019 08:33) (94% - 98%)    LABS:                        11.2   4.80  )-----------( 153      ( 20 Dec 2019 04:30 )             32.4     12-19    136  |  98  |  12  ----------------------------<  192<H>  3.9   |  22  |  <0.5<L>    Ca    8.5      19 Dec 2019 05:48  Mg     1.8     12-19    TPro  5.2<L>  /  Alb  3.1<L>  /  TBili  5.4<H>  /  DBili  x   /  AST  403<H>  /  ALT  205<H>  /  AlkPhos  1400<H>  12-19    PT/INR - ( 19 Dec 2019 05:48 )   PT: 15.00 sec;   INR: 1.31 ratio         PTT - ( 20 Dec 2019 04:30 )  PTT:59.9 sec          CARDIAC MARKERS ( 18 Dec 2019 22:30 )  x     / <0.01 ng/mL / x     / x     / x          RADIOLOGY:  12/18 CT Abd/Pelvis - IMPRESSION: The previously noted mass involving the pancreatic head and body measuring 5.6 x 6.9 cm on the previous PET/CT scan, now measures 10.6 x 8.4 cm. Marked intrahepatic bile duct dilatation and dilatation of the common hepatic duct down to the level of the pancreatic mass.    PHYSICAL EXAM:  CONSTITUTIONAL: No acute distress, well-developed, well-groomed, AAOx3  HEAD: Atraumatic, normocephalic  EYES: EOM intact, conjunctiva and sclera clear, no sclera icterus  ENT: Supple, no masses, no JVD; moist mucous membranes  PULMONARY: Clear to auscultation bilaterally; no wheezes, rales, or rhonchi  CARDIOVASCULAR: Regular rate and rhythm; no murmurs, rubs, or gallops  GASTROINTESTINAL: Soft, mildly TTP at RUQ, but all other quadrants non-tender, non-distended; bowel sounds present. Incision scars noted  MUSCULOSKELETAL: 2+ peripheral pulses; no clubbing, no cyanosis, no edema  NEUROLOGY: non-focal  SKIN: No rashes or lesions; warm and dry    ASSESSMENT & PLAN  ASSESSMENT AND PLAN:  Patient is a 84y old Female w/ pmhx of pancreatic CA dx last month on chemotherapy (gemcitabine and abraxane), a.fib on eliquis, DM, HTN, DLD, and colon CA s/p resection who presents with a chief complaint of over 1 week duration of worsening, crampy, RUQ, 8/10, constant, RUQ and epigastric abd pain that radiated into her back with associated bilious vomiting, decreased PO intake, and chill, but no fevers. She is currently admitted to medicine with the primary diagnosis of Transaminitis 2/2 to obstruction by pancreatic mass.    #Transaminitis 2/2 to obstruction  - pancreatic cancer diagnosis last month with elevated LFTs at outpt onc office  - LFTs(12/18 in ED), AST - 500 , ALT - 213, alk phos - 1416, improved today (12/20)  AST - 152 , ALT - 130, alk phos - 1123  -CT abdomen showed 10.6 x 8.4 cm mass involving the 10.6 x 8.4 cm pancreatic head and body w/marked intrahepatic bile duct dilatation and dilatation of the common hepatic duct down to the level of the pancreatic mass  -PTC w/ IR this morning, biliary catheter placement, and brush biopsy   -c/w Morphine PRN for pain  -GI rec bedside abd plain film    #) Suspected sepsis - SIRS criteria met  - Low WBC, but wnl today - 4.80   -Pending blood cultures  -Started on Zosyn 3.75 q6h, and IV fluids NS @75cc/hr     #Atrial fibrillation  -c/w cardizem 120mg TID PO  - Heparin gtt  - Monitor w/telemetry    #Pancreatic cancer  -On gemcitabine and abraxane  - Rec for picc or port to be placed with IR  -s/p 1 cycle of chemo on 12/12  -Hem onc following,  outpatient    #Hypertension  -c/w lisinopril, cardizem    #DM   -Monitor FS and start on insulin if FS > 200    #DLD  -c/w atorvastatin    #diet: NPO  #DVT ppx: SCD   #GI ppx: none  #Dispo: D/C following PTC w/ outpt f/u and chemo with Dr. Vela ZHAO KUMAR 84y Female  MRN#: [id number]   Hospital Day: 2d    SUBJECTIVE  Patient is a 84y old Female w/ pmhx of pancreatic CA dx last month on chemotherapy (gemcitabine and abraxane), a.fib on eliquis, DM, HTN, DLD, and colon CA s/p resection who presents with a chief complaint of over 1 week duration worsening, crampy, RUQ, 8/10, constant, RUQ and epigastric abd pain that radiated into her back with associated bilious vomiting, decreased PO intake, and chill, but no fevers. The patient went to see her oncologist, Dr. Vela two days ago and was informed that blood tests showed elevated LFTs. She was sent to the ED and it was discovered that she had AST-500, and ALT- 213, alk phos - 1416, direct bili - 5.4 (total 5.9), and lipase at 6. CT showed no intestinal obstruction, but marked intrahepatic bile duct dilation, and dilation of the common hepatic duct down to the level of the 10.6 x 8.4 cm mass. Pt was also in a.fib with RVR with a HR of 122. She was given 10 mg cardizem IV and 60 mg PO cardizem.     She is currently admitted to medicine with the primary diagnosis of Transaminitis 2/2 to obstruction by pancreatic mass. Today is hospital day 2.     INTERVAL HPI AND OVERNIGHT EVENTS:  Patient was examined and seen at bedside. This morning she is resting comfortably in bed and reports no issues or overnight events. Reports diarrhea last night, but says it has since resolved and denies any other complaints.  Patient was made NPO at midnight for PTC w/ IR this morning.     REVIEW OF SYMPTOMS:  CONSTITUTIONAL: No weakness, fevers or chills; No headaches  EYES: No visual changes, eye pain, or discharge  ENT: No vertigo; No ear pain or change in hearing; No sore throat or difficulty swallowing  NECK: No pain or stiffness  RESPIRATORY: No cough, wheezing, or hemoptysis; No shortness of breath  CARDIOVASCULAR: No chest pain or palpitations  GASTROINTESTINAL: + RUQ abdominal/epigastric pain; No nausea, vomiting, or hematemesis; + diarrhea   GENITOURINARY: No dysuria, frequency or hematuria  MUSCULOSKELETAL: No joint pain, no muscle pain, no weakness  NEUROLOGICAL: No numbness or weakness  SKIN: No itching or rashes    OBJECTIVE  PAST MEDICAL & SURGICAL HISTORY  Pancreatic cancer  Anxiety  Hypercholesteremia  Afib  DM (diabetes mellitus)  Acquired cataract  Abnormal serum cholesterol  Generalized OA  Mildly obese  H/O: HTN (hypertension)  History of tonsillectomy  History of cataract surgery  History of colon resection  History of cholecystectomy  H/O hernia repair: abdomen    ALLERGIES:  No Known Allergies    MEDICATIONS:  STANDING MEDICATIONS  atorvastatin 20 milliGRAM(s) Oral at bedtime  chlorhexidine 4% Liquid 1 Application(s) Topical <User Schedule>  dextrose 5% + sodium chloride 0.45%. 1000 milliLiter(s) IV Continuous <Continuous>  diltiazem    Tablet 120 milliGRAM(s) Oral three times a day  dorzolamide 2% Ophthalmic Solution 1 Drop(s) Left EYE three times a day  FLUoxetine 20 milliGRAM(s) Oral daily  heparin  Infusion 900 Unit(s)/Hr IV Continuous <Continuous>  influenza   Vaccine 0.5 milliLiter(s) IntraMuscular once  latanoprost 0.005% Ophthalmic Solution 1 Drop(s) Right EYE at bedtime  lisinopril 40 milliGRAM(s) Oral daily  piperacillin/tazobactam IVPB.. 3.375 Gram(s) IV Intermittent every 8 hours  senna 2 Tablet(s) Oral daily    PRN MEDICATIONS  morphine  - Injectable 2 milliGRAM(s) IV Push every 6 hours PRN      VITAL SIGNS: Last 24 Hours  T(C): 35.6 (20 Dec 2019 08:33), Max: 36.1 (19 Dec 2019 16:09)  T(F): 96 (20 Dec 2019 06:24), Max: 97 (19 Dec 2019 16:09)  HR: 104 (20 Dec 2019 08:33) (74 - 106)  BP: 119/58 (20 Dec 2019 08:33) (102/57 - 119/58)  BP(mean): --  RR: 17 (20 Dec 2019 08:33) (17 - 18)  SpO2: 96% (20 Dec 2019 08:33) (94% - 98%)    LABS:                        11.2   4.80  )-----------( 153      ( 20 Dec 2019 04:30 )             32.4     12-19    136  |  98  |  12  ----------------------------<  192<H>  3.9   |  22  |  <0.5<L>    Ca    8.5      19 Dec 2019 05:48  Mg     1.8     12-19    TPro  5.2<L>  /  Alb  3.1<L>  /  TBili  5.4<H>  /  DBili  x   /  AST  403<H>  /  ALT  205<H>  /  AlkPhos  1400<H>  12-19    PT/INR - ( 19 Dec 2019 05:48 )   PT: 15.00 sec;   INR: 1.31 ratio         PTT - ( 20 Dec 2019 04:30 )  PTT:59.9 sec          CARDIAC MARKERS ( 18 Dec 2019 22:30 )  x     / <0.01 ng/mL / x     / x     / x          RADIOLOGY:  12/18 CT Abd/Pelvis - IMPRESSION: The previously noted mass involving the pancreatic head and body measuring 5.6 x 6.9 cm on the previous PET/CT scan, now measures 10.6 x 8.4 cm. Marked intrahepatic bile duct dilatation and dilatation of the common hepatic duct down to the level of the pancreatic mass.    PHYSICAL EXAM:  CONSTITUTIONAL: No acute distress, well-developed, well-groomed, AAOx3  HEAD: Atraumatic, normocephalic  EYES: EOM intact, conjunctiva and sclera clear, no sclera icterus  ENT: Supple, no masses, no JVD; moist mucous membranes  PULMONARY: Clear to auscultation bilaterally; no wheezes, rales, or rhonchi  CARDIOVASCULAR: Regular rate and rhythm; no murmurs, rubs, or gallops  GASTROINTESTINAL: Soft, mildly TTP at RUQ, but all other quadrants non-tender, non-distended; bowel sounds present. Incision scars noted  MUSCULOSKELETAL: 2+ peripheral pulses; no clubbing, no cyanosis, no edema  NEUROLOGY: non-focal  SKIN: No rashes or lesions; warm and dry    ASSESSMENT & PLAN  ASSESSMENT AND PLAN:  Patient is a 84y old Female w/ pmhx of pancreatic CA dx last month on chemotherapy (gemcitabine and abraxane), a.fib on eliquis, DM, HTN, DLD, and colon CA s/p resection who presents with a chief complaint of over 1 week duration of worsening, crampy, RUQ, 8/10, constant, RUQ and epigastric abd pain that radiated into her back with associated bilious vomiting, decreased PO intake, and chill, but no fevers. She is currently admitted to medicine with the primary diagnosis of Transaminitis 2/2 to obstruction by pancreatic mass.    #Transaminitis 2/2 to obstruction  - pancreatic cancer diagnosis last month with elevated LFTs at outpt onc office  - LFTs(12/18 in ED), AST - 500 , ALT - 213, alk phos - 1416, improved today (12/20)  AST - 152 , ALT - 130, alk phos - 1123  -CT abdomen showed 10.6 x 8.4 cm mass involving the 10.6 x 8.4 cm pancreatic head and body w/marked intrahepatic bile duct dilatation and dilatation of the common hepatic duct down to the level of the pancreatic mass  -PTC w/ IR this morning, biliary catheter placement, and brush biopsy   -c/w Morphine PRN for pain  -GI rec bedside abd plain film    #) Suspected sepsis likely 2/2 to cholangitis   - SIRS criteria met on admission  - Currently afebrile, WBC - 4.80 today, vitals wnl  - Still pending blood cultures  -Per ID: c/w Zosyn 3.75 q6h, and IV fluids NS @75cc/hr     #Atrial fibrillation  -c/w cardizem 120mg TID PO  - Heparin gtt  - Monitor w/telemetry    #Pancreatic cancer  -On gemcitabine and abraxane  - Rec for picc or port to be placed with IR  -s/p 1 cycle of chemo on 12/12  -Hem onc following,  outpatient    #Hypertension  -c/w lisinopril, cardizem    #DM   -Monitor FS and start on insulin if FS > 200    #DLD  -c/w atorvastatin    #diet: NPO  #DVT ppx: SCD   #GI ppx: none  #Dispo: D/C following PTC w/ outpt f/u and chemo with Dr. Vela ZHAO KUMAR 84y Female  MRN#: [id number]   Hospital Day: 2d    SUBJECTIVE  Patient is a 84y old Female w/ pmhx of pancreatic CA dx last month on chemotherapy (gemcitabine and abraxane), a.fib on eliquis, DM, HTN, DLD, and colon CA s/p resection who presents with a chief complaint of over 1 week duration worsening, crampy, RUQ, 8/10, constant, RUQ and epigastric abd pain that radiated into her back with associated bilious vomiting, decreased PO intake, and chill, but no fevers. The patient went to see her oncologist, Dr. Vela two days ago and was informed that blood tests showed elevated LFTs. She was sent to the ED and it was discovered that she had AST-500, and ALT- 213, alk phos - 1416, direct bili - 5.4 (total 5.9), and lipase at 6. CT showed no intestinal obstruction, but marked intrahepatic bile duct dilation, and dilation of the common hepatic duct down to the level of the 10.6 x 8.4 cm mass. Pt was also in a.fib with RVR with a HR of 122. She was given 10 mg cardizem IV and 60 mg PO cardizem.     She is currently admitted to medicine with the primary diagnosis of Transaminitis 2/2 to obstruction by pancreatic mass. Today is hospital day 2.     INTERVAL HPI AND OVERNIGHT EVENTS:  Patient was examined and seen at bedside. This morning she is resting comfortably in bed and reports no issues or overnight events. Reports diarrhea last night, but says it has since resolved and denies any other complaints.  Patient was made NPO at midnight for PTC w/ IR this morning.     REVIEW OF SYMPTOMS:  CONSTITUTIONAL: No weakness, fevers or chills; No headaches  EYES: No visual changes, eye pain, or discharge  ENT: No vertigo; No ear pain or change in hearing; No sore throat or difficulty swallowing  NECK: No pain or stiffness  RESPIRATORY: No cough, wheezing, or hemoptysis; No shortness of breath  CARDIOVASCULAR: No chest pain or palpitations  GASTROINTESTINAL: + RUQ abdominal/epigastric pain; No nausea, vomiting, or hematemesis; + diarrhea   GENITOURINARY: No dysuria, frequency or hematuria  MUSCULOSKELETAL: No joint pain, no muscle pain, no weakness  NEUROLOGICAL: No numbness or weakness  SKIN: No itching or rashes    OBJECTIVE  PAST MEDICAL & SURGICAL HISTORY  Pancreatic cancer  Anxiety  Hypercholesteremia  Afib  DM (diabetes mellitus)  Acquired cataract  Abnormal serum cholesterol  Generalized OA  Mildly obese  H/O: HTN (hypertension)  History of tonsillectomy  History of cataract surgery  History of colon resection  History of cholecystectomy  H/O hernia repair: abdomen    ALLERGIES:  No Known Allergies    MEDICATIONS:  STANDING MEDICATIONS  atorvastatin 20 milliGRAM(s) Oral at bedtime  chlorhexidine 4% Liquid 1 Application(s) Topical <User Schedule>  dextrose 5% + sodium chloride 0.45%. 1000 milliLiter(s) IV Continuous <Continuous>  diltiazem    Tablet 120 milliGRAM(s) Oral three times a day  dorzolamide 2% Ophthalmic Solution 1 Drop(s) Left EYE three times a day  FLUoxetine 20 milliGRAM(s) Oral daily  heparin  Infusion 900 Unit(s)/Hr IV Continuous <Continuous>  influenza   Vaccine 0.5 milliLiter(s) IntraMuscular once  latanoprost 0.005% Ophthalmic Solution 1 Drop(s) Right EYE at bedtime  lisinopril 40 milliGRAM(s) Oral daily  piperacillin/tazobactam IVPB.. 3.375 Gram(s) IV Intermittent every 8 hours  senna 2 Tablet(s) Oral daily    PRN MEDICATIONS  morphine  - Injectable 2 milliGRAM(s) IV Push every 6 hours PRN      VITAL SIGNS: Last 24 Hours  T(C): 35.6 (20 Dec 2019 08:33), Max: 36.1 (19 Dec 2019 16:09)  T(F): 96 (20 Dec 2019 06:24), Max: 97 (19 Dec 2019 16:09)  HR: 104 (20 Dec 2019 08:33) (74 - 106)  BP: 119/58 (20 Dec 2019 08:33) (102/57 - 119/58)  BP(mean): --  RR: 17 (20 Dec 2019 08:33) (17 - 18)  SpO2: 96% (20 Dec 2019 08:33) (94% - 98%)    LABS:                        11.2   4.80  )-----------( 153      ( 20 Dec 2019 04:30 )             32.4     12-19    136  |  98  |  12  ----------------------------<  192<H>  3.9   |  22  |  <0.5<L>    Ca    8.5      19 Dec 2019 05:48  Mg     1.8     12-19    TPro  5.2<L>  /  Alb  3.1<L>  /  TBili  5.4<H>  /  DBili  x   /  AST  403<H>  /  ALT  205<H>  /  AlkPhos  1400<H>  12-19    PT/INR - ( 19 Dec 2019 05:48 )   PT: 15.00 sec;   INR: 1.31 ratio         PTT - ( 20 Dec 2019 04:30 )  PTT:59.9 sec          CARDIAC MARKERS ( 18 Dec 2019 22:30 )  x     / <0.01 ng/mL / x     / x     / x          RADIOLOGY:  12/18 CT Abd/Pelvis - IMPRESSION: The previously noted mass involving the pancreatic head and body measuring 5.6 x 6.9 cm on the previous PET/CT scan, now measures 10.6 x 8.4 cm. Marked intrahepatic bile duct dilatation and dilatation of the common hepatic duct down to the level of the pancreatic mass.    PHYSICAL EXAM:  CONSTITUTIONAL: No acute distress, well-developed, well-groomed, AAOx3  HEAD: Atraumatic, normocephalic  EYES: EOM intact, conjunctiva and sclera clear, no sclera icterus  ENT: Supple, no masses, no JVD; moist mucous membranes  PULMONARY: Clear to auscultation bilaterally; no wheezes, rales, or rhonchi  CARDIOVASCULAR: Regular rate and rhythm; no murmurs, rubs, or gallops  GASTROINTESTINAL: Soft, mildly TTP at RUQ, but all other quadrants non-tender, non-distended; bowel sounds present. Incision scars noted  MUSCULOSKELETAL: 2+ peripheral pulses; no clubbing, no cyanosis, no edema  NEUROLOGY: non-focal  SKIN: No rashes or lesions; warm and dry    ASSESSMENT & PLAN  ASSESSMENT AND PLAN:  Patient is a 84y old Female w/ pmhx of pancreatic CA dx last month on chemotherapy (gemcitabine and abraxane), a.fib on eliquis, DM, HTN, DLD, and colon CA s/p resection who presents with a chief complaint of over 1 week duration of worsening, crampy, RUQ, 8/10, constant, RUQ and epigastric abd pain that radiated into her back with associated bilious vomiting, decreased PO intake, and chill, but no fevers. She is currently admitted to medicine with the primary diagnosis of Transaminitis 2/2 to obstruction by pancreatic mass.    Pending/update: IR to follow, PTT for hep drip, UA, picc vs port    #Transaminitis 2/2 to obstruction  - pancreatic cancer diagnosis last month with elevated LFTs at outpt onc office  - LFTs(12/18 in ED), AST - 500 , ALT - 213, alk phos - 1416, improved today (12/20)  AST - 152 , ALT - 130, alk phos - 1123  -CT abdomen showed 10.6 x 8.4 cm mass involving the 10.6 x 8.4 cm pancreatic head and body w/marked intrahepatic bile duct dilatation and dilatation of the common hepatic duct down to the level of the pancreatic mass  -Patient is s/p PTC w/ IR  biliary catheter placement, has external drain and s/p brush biopsy   - Fu brush biopsy result   -c/w Morphine PRN for pain  - IR is still following the patient for possibly internalization in the future  - Transient increase in bilirubinemia is expected post-procedure.  - patient might get another procedure in future, thus will keep the patient on heparin drip    #) Suspected cholangitis in pt with pancreatic CA vs tumor related biliary obstruction   - SIRS criteria met on admission  - Currently afebrile, WBC - 4.80 today, vitals wnl  - pending blood cultures, UA  -Per ID: c/w Zosyn 3.75 q8h, and IV fluids NS @75cc/hr     #Atrial fibrillation  -c/w cardizem 120mg TID PO  - Heparin gtt goal 60-90    #Pancreatic cancer  -On gemcitabine and Abraxane  - Rec for picc or port to be placed with IR, pending H/O note to exactly say if they need picc line or chemoport?? IR can do either  -s/p 1 cycle of chemo on 12/12  -Hem onc following,    #Hypertension  -c/w lisinopril, cardizem    #DM   -Monitor FS and start on insulin if FS > 200    #DLD  -c/w atorvastatin    #diet: NPO  #DVT ppx: SCD   #GI ppx: none  #Dispo: D/C following PTC w/ outpt f/u and chemo with Dr. Vela

## 2019-12-21 LAB
APTT BLD: 67.5 SEC — HIGH (ref 27–39.2)
APTT BLD: 69.7 SEC — HIGH (ref 27–39.2)
GLUCOSE BLDC GLUCOMTR-MCNC: 187 MG/DL — HIGH (ref 70–99)
GLUCOSE BLDC GLUCOMTR-MCNC: 193 MG/DL — HIGH (ref 70–99)
GLUCOSE BLDC GLUCOMTR-MCNC: 198 MG/DL — HIGH (ref 70–99)
GLUCOSE BLDC GLUCOMTR-MCNC: 200 MG/DL — HIGH (ref 70–99)
INR BLD: 1.26 RATIO — SIGNIFICANT CHANGE UP (ref 0.65–1.3)
PROTHROM AB SERPL-ACNC: 14.5 SEC — HIGH (ref 9.95–12.87)

## 2019-12-21 PROCEDURE — 99233 SBSQ HOSP IP/OBS HIGH 50: CPT

## 2019-12-21 RX ORDER — HEPARIN SODIUM 5000 [USP'U]/ML
1100 INJECTION INTRAVENOUS; SUBCUTANEOUS
Qty: 25000 | Refills: 0 | Status: DISCONTINUED | OUTPATIENT
Start: 2019-12-21 | End: 2019-12-24

## 2019-12-21 RX ADMIN — CHLORHEXIDINE GLUCONATE 1 APPLICATION(S): 213 SOLUTION TOPICAL at 05:32

## 2019-12-21 RX ADMIN — Medication 120 MILLIGRAM(S): at 05:33

## 2019-12-21 RX ADMIN — SENNA PLUS 2 TABLET(S): 8.6 TABLET ORAL at 11:21

## 2019-12-21 RX ADMIN — MORPHINE SULFATE 2 MILLIGRAM(S): 50 CAPSULE, EXTENDED RELEASE ORAL at 21:20

## 2019-12-21 RX ADMIN — MORPHINE SULFATE 2 MILLIGRAM(S): 50 CAPSULE, EXTENDED RELEASE ORAL at 05:35

## 2019-12-21 RX ADMIN — DORZOLAMIDE HYDROCHLORIDE 1 DROP(S): 20 SOLUTION/ DROPS OPHTHALMIC at 13:49

## 2019-12-21 RX ADMIN — DORZOLAMIDE HYDROCHLORIDE 1 DROP(S): 20 SOLUTION/ DROPS OPHTHALMIC at 05:33

## 2019-12-21 RX ADMIN — PIPERACILLIN AND TAZOBACTAM 25 GRAM(S): 4; .5 INJECTION, POWDER, LYOPHILIZED, FOR SOLUTION INTRAVENOUS at 05:33

## 2019-12-21 RX ADMIN — HEPARIN SODIUM 11 UNIT(S)/HR: 5000 INJECTION INTRAVENOUS; SUBCUTANEOUS at 11:23

## 2019-12-21 RX ADMIN — PIPERACILLIN AND TAZOBACTAM 25 GRAM(S): 4; .5 INJECTION, POWDER, LYOPHILIZED, FOR SOLUTION INTRAVENOUS at 13:49

## 2019-12-21 RX ADMIN — SODIUM CHLORIDE 100 MILLILITER(S): 9 INJECTION, SOLUTION INTRAVENOUS at 21:25

## 2019-12-21 RX ADMIN — MORPHINE SULFATE 2 MILLIGRAM(S): 50 CAPSULE, EXTENDED RELEASE ORAL at 12:19

## 2019-12-21 RX ADMIN — PIPERACILLIN AND TAZOBACTAM 25 GRAM(S): 4; .5 INJECTION, POWDER, LYOPHILIZED, FOR SOLUTION INTRAVENOUS at 21:19

## 2019-12-21 RX ADMIN — Medication 120 MILLIGRAM(S): at 21:19

## 2019-12-21 RX ADMIN — MORPHINE SULFATE 2 MILLIGRAM(S): 50 CAPSULE, EXTENDED RELEASE ORAL at 12:04

## 2019-12-21 RX ADMIN — MORPHINE SULFATE 2 MILLIGRAM(S): 50 CAPSULE, EXTENDED RELEASE ORAL at 21:35

## 2019-12-21 RX ADMIN — ATORVASTATIN CALCIUM 20 MILLIGRAM(S): 80 TABLET, FILM COATED ORAL at 21:19

## 2019-12-21 RX ADMIN — LISINOPRIL 40 MILLIGRAM(S): 2.5 TABLET ORAL at 05:33

## 2019-12-21 RX ADMIN — MORPHINE SULFATE 2 MILLIGRAM(S): 50 CAPSULE, EXTENDED RELEASE ORAL at 06:05

## 2019-12-21 RX ADMIN — Medication 120 MILLIGRAM(S): at 13:50

## 2019-12-21 RX ADMIN — Medication 20 MILLIGRAM(S): at 11:21

## 2019-12-21 RX ADMIN — DORZOLAMIDE HYDROCHLORIDE 1 DROP(S): 20 SOLUTION/ DROPS OPHTHALMIC at 21:19

## 2019-12-21 RX ADMIN — SODIUM CHLORIDE 100 MILLILITER(S): 9 INJECTION, SOLUTION INTRAVENOUS at 13:55

## 2019-12-21 RX ADMIN — LATANOPROST 1 DROP(S): 0.05 SOLUTION/ DROPS OPHTHALMIC; TOPICAL at 22:47

## 2019-12-21 NOTE — PROGRESS NOTE ADULT - ASSESSMENT
continue  present managment   picc line on monday   discussed with residents and pt ,    mikey simon MD   [phone]

## 2019-12-21 NOTE — PROGRESS NOTE ADULT - ASSESSMENT
Patient is a 84y old Female w/ pmhx of pancreatic CA dx last month on chemotherapy (gemcitabine and abraxane), a.fib on eliquis, DM, HTN, DLD, and colon CA s/p resection who presents with a chief complaint of over 1 week duration of worsening, crampy, RUQ, 8/10, constant, RUQ and epigastric abd pain that radiated into her back with associated bilious vomiting, decreased PO intake, and chill, but no fevers. She is currently admitted to medicine with the primary diagnosis of Transaminitis 2/2 to obstruction by pancreatic mass. patient is s/p Placement of an 8F external-internal biliary drain on 12/20/19    Pending/update: IR to follow, PTT for hep drip, UA, picc vs port    #Transaminitis 2/2 to obstruction  - pancreatic cancer diagnosis last month with elevated LFTs at outpt onc office  - Trend LFTS  -CT abdomen showed 10.6 x 8.4 cm mass involving the 10.6 x 8.4 cm pancreatic head and body w/marked intrahepatic bile duct dilatation and dilatation of the common hepatic duct down to the level of the pancreatic mass  -Patient is s/p PTC w/ IR  biliary catheter placement, has external drain and s/p brush biopsy on 12/20/19  - Fu brush biopsy result   -c/w Morphine PRN for pain  - IR is still following the patient for possibly internalization in the future  - Transient increase in bilirubinemia is expected post-procedure.  - patient might get another procedure in future, thus will keep the patient on heparin drip    #) Suspected cholangitis in pt with pancreatic CA vs tumor related biliary obstruction   - SIRS criteria met on admission  - Currently afebrile, WBC - 4.80 today, vitals wnl  - pending blood cultures, UA  -Per ID: c/w Zosyn 3.75 q8h, and IV fluids NS @75cc/hr     #Atrial fibrillation  -c/w cardizem 120mg TID PO  - Heparin gtt goal 60-90    #Pancreatic cancer  -On gemcitabine and Abraxane  - Rec for picc or port to be placed with IR, pending H/O note to exactly say if they need picc line or chemoport?? IR can do either  -s/p 1 cycle of chemo on 12/12  -Hem onc following,    #Hypertension  -c/w lisinopril, cardizem    #DM   -Monitor FS and start on insulin if FS > 200    #DLD  -c/w atorvastatin    #diet: full liquids  #DVT ppx: SCD   #GI ppx: none  #Dispo: needs picc line vs chemoport Patient is a 84y old Female w/ pmhx of pancreatic CA dx last month on chemotherapy (gemcitabine and abraxane), a.fib on eliquis, DM, HTN, DLD, and colon CA s/p resection who presents with a chief complaint of over 1 week duration of worsening, crampy, RUQ, 8/10, constant, RUQ and epigastric abd pain that radiated into her back with associated bilious vomiting, decreased PO intake, and chill, but no fevers. She is currently admitted to medicine with the primary diagnosis of Transaminitis 2/2 to obstruction by pancreatic mass. patient is s/p Placement of an 8F external-internal biliary drain on 12/20/19    Pending/update: IR to follow, PTT for hep drip, UA, picc vs port    #Transaminitis 2/2 to obstruction  - pancreatic cancer diagnosis last month with elevated LFTs at outpt onc office  - Trend LFTS  -CT abdomen showed 10.6 x 8.4 cm mass involving the 10.6 x 8.4 cm pancreatic head and body w/marked intrahepatic bile duct dilatation and dilatation of the common hepatic duct down to the level of the pancreatic mass  -Patient is s/p PTC w/ IR  biliary catheter placement, has external drain and s/p brush biopsy on 12/20/19  - Fu brush biopsy result   -c/w Morphine PRN for pain  - IR is still following the patient for possibly internalization in the future  - Transient increase in bilirubinemia is expected post-procedure.  - patient might get another procedure in future, thus will keep the patient on heparin drip    #) Suspected cholangitis in pt with pancreatic CA vs tumor related biliary obstruction   - SIRS criteria met on admission  - Currently afebrile, vitals wnl  - pending blood cultures,   -Per ID: c/w Zosyn 3.75 q8h, and IV fluids NS @75cc/hr     #Atrial fibrillation  -c/w cardizem 120mg TID PO  - Heparin gtt goal 60-90    #Pancreatic cancer  -On gemcitabine and Abraxane  - Rec for picc or port to be placed with IR, pending H/O note to exactly say if they need picc line or chemoport?? IR can do either  - H/O wants picc line currently, contacted IR depending on schedule patient will get it monday or tuesday.  -s/p 1 cycle of chemo on 12/12  -Hem onc following,    #Hypertension  -c/w lisinopril, cardizem    #DM   -Monitor FS and start on insulin if FS > 200    #DLD  -c/w atorvastatin    #diet: full liquids  #DVT ppx: SCD   #GI ppx: none  #Dispo: needs picc line vs chemoport

## 2019-12-21 NOTE — PROGRESS NOTE ADULT - SUBJECTIVE AND OBJECTIVE BOX
locally advanced unresectable pancreatic cancer  newly diagnosed sp one cycle of chemo  gem and abraxane   now admitted for worsening LFTS   HAS PERCUTANEOUS DRAINAGE done by IR   LFTS improving slowly   on antibiotics   today took clear liquids ,no pain no vomiting   daughter at bedside

## 2019-12-21 NOTE — PROGRESS NOTE ADULT - ATTENDING COMMENTS
Patient seen and examined independently. I personally had a face-to-face encounter with the patient, examined the patient myself and reviewed the plan of care with the housestaff. Agree with Resident's note but my note supersedes the resident's note in matters hereby listed.     S : No CP/SOB  Other ROS neg.    Corroborate with earlier exam.  Lungs CTAB. Right upper abdo drain noted. serosanguinous discharge.  Labs/Rad : Reviewed.     84y old Female w/ pmhx of pancreatic CA dx last month on chemotherapy (gemcitabine and abraxane), a.fib on eliquis, DM, HTN, DLD, and colon CA s/p resection who presents 1 week duration of worsening, crampy, RUQ pain  with associated bilious vomiting, decreased PO intake, and chill, but no fevers. She is currently admitted to medicine with the primary diagnosis of Transaminitis 2/2 to obstruction by pancreatic mass. patient is s/p Placement of an 8F external-internal biliary drain on 12/20/19 by IR    #Transaminitis 2/2 to obstruction  - pancreatic cancer diagnosis last month with elevated LFTs at outpt onc office  -Patient is s/p PTC w/ IR  biliary catheter placement, has external drain and s/p brush biopsy on 12/20/19. subjectively better.  - Trend LFTS  -CT abdomen showed 10.6 x 8.4 cm mass involving the 10.6 x 8.4 cm pancreatic head and body w/marked intrahepatic bile duct dilatation and dilatation of the common hepatic duct down to the level of the pancreatic mass    - Fu brush biopsy result   -c/w Morphine PRN for pain    - IR is still following the patient for possibly internalization - ?Monday  Also request PICC on monday, for her outpatient chemo.  Until then c/w hep gtt. maintain ptt    #) Suspected cholangitis in pt with pancreatic CA vs tumor related biliary obstruction   - SIRS criteria met on admission  - Currently afebrile, vitals wnl  - pending blood cultures,   -Per ID: c/w Zosyn 3.75 q8h, and IV fluids NS @75cc/hr     #Atrial fibrillation  -c/w cardizem 120mg TID PO  - Heparin gtt goal 60-90    #Pancreatic cancer  -On gemcitabine and Abraxane  - Rec for picc or port to be placed with IR, pending H/O note to exactly say if they need picc line or chemoport?? IR can do either  - H/O wants picc line currently, contacted IR depending on schedule patient will get it monday or tuesday.  -s/p 1 cycle of chemo on 12/12  -Hem onc following,    #Hypertension  -c/w lisinopril, cardizem    #DM   -Monitor FS and start on insulin if FS > 200    #DLD  -c/w atorvastatin    #diet: full liquids  #DVT ppx: SCD   #GI ppx: none  #Dispo: needs picc line vs chemoport

## 2019-12-22 LAB
ALBUMIN SERPL ELPH-MCNC: 2.3 G/DL — LOW (ref 3.5–5.2)
ALP SERPL-CCNC: 778 U/L — HIGH (ref 30–115)
ALT FLD-CCNC: 56 U/L — HIGH (ref 0–41)
ANION GAP SERPL CALC-SCNC: 15 MMOL/L — HIGH (ref 7–14)
APPEARANCE UR: CLEAR — SIGNIFICANT CHANGE UP
APTT BLD: 73.7 SEC — CRITICAL HIGH (ref 27–39.2)
APTT BLD: 76.4 SEC — CRITICAL HIGH (ref 27–39.2)
APTT BLD: 82.5 SEC — CRITICAL HIGH (ref 27–39.2)
AST SERPL-CCNC: 23 U/L — SIGNIFICANT CHANGE UP (ref 0–41)
BILIRUB SERPL-MCNC: 1 MG/DL — SIGNIFICANT CHANGE UP (ref 0.2–1.2)
BILIRUB UR-MCNC: NEGATIVE — SIGNIFICANT CHANGE UP
BUN SERPL-MCNC: 5 MG/DL — LOW (ref 10–20)
CALCIUM SERPL-MCNC: 7.8 MG/DL — LOW (ref 8.5–10.1)
CHLORIDE SERPL-SCNC: 95 MMOL/L — LOW (ref 98–110)
CO2 SERPL-SCNC: 20 MMOL/L — SIGNIFICANT CHANGE UP (ref 17–32)
COLOR SPEC: YELLOW — SIGNIFICANT CHANGE UP
CREAT SERPL-MCNC: <0.5 MG/DL — LOW (ref 0.7–1.5)
DIFF PNL FLD: NEGATIVE — SIGNIFICANT CHANGE UP
GLUCOSE BLDC GLUCOMTR-MCNC: 185 MG/DL — HIGH (ref 70–99)
GLUCOSE BLDC GLUCOMTR-MCNC: 197 MG/DL — HIGH (ref 70–99)
GLUCOSE BLDC GLUCOMTR-MCNC: 211 MG/DL — HIGH (ref 70–99)
GLUCOSE BLDC GLUCOMTR-MCNC: 216 MG/DL — HIGH (ref 70–99)
GLUCOSE SERPL-MCNC: 232 MG/DL — HIGH (ref 70–99)
GLUCOSE UR QL: SIGNIFICANT CHANGE UP
HCT VFR BLD CALC: 32 % — LOW (ref 37–47)
HGB BLD-MCNC: 11.4 G/DL — LOW (ref 12–16)
KETONES UR-MCNC: NEGATIVE — SIGNIFICANT CHANGE UP
LEUKOCYTE ESTERASE UR-ACNC: NEGATIVE — SIGNIFICANT CHANGE UP
MCHC RBC-ENTMCNC: 32.8 PG — HIGH (ref 27–31)
MCHC RBC-ENTMCNC: 35.6 G/DL — SIGNIFICANT CHANGE UP (ref 32–37)
MCV RBC AUTO: 92 FL — SIGNIFICANT CHANGE UP (ref 81–99)
NITRITE UR-MCNC: NEGATIVE — SIGNIFICANT CHANGE UP
NRBC # BLD: 0 /100 WBCS — SIGNIFICANT CHANGE UP (ref 0–0)
PH UR: 6.5 — SIGNIFICANT CHANGE UP (ref 5–8)
PLATELET # BLD AUTO: 143 K/UL — SIGNIFICANT CHANGE UP (ref 130–400)
POTASSIUM SERPL-MCNC: 2.8 MMOL/L — LOW (ref 3.5–5)
POTASSIUM SERPL-SCNC: 2.8 MMOL/L — LOW (ref 3.5–5)
PROT SERPL-MCNC: 4.4 G/DL — LOW (ref 6–8)
PROT UR-MCNC: SIGNIFICANT CHANGE UP
RBC # BLD: 3.48 M/UL — LOW (ref 4.2–5.4)
RBC # FLD: 15.1 % — HIGH (ref 11.5–14.5)
SODIUM SERPL-SCNC: 130 MMOL/L — LOW (ref 135–146)
SP GR SPEC: 1.02 — SIGNIFICANT CHANGE UP (ref 1.01–1.02)
UROBILINOGEN FLD QL: SIGNIFICANT CHANGE UP
WBC # BLD: 4.74 K/UL — LOW (ref 4.8–10.8)
WBC # FLD AUTO: 4.74 K/UL — LOW (ref 4.8–10.8)

## 2019-12-22 PROCEDURE — 99232 SBSQ HOSP IP/OBS MODERATE 35: CPT

## 2019-12-22 RX ORDER — POTASSIUM CHLORIDE 20 MEQ
40 PACKET (EA) ORAL EVERY 8 HOURS
Refills: 0 | Status: DISCONTINUED | OUTPATIENT
Start: 2019-12-22 | End: 2019-12-22

## 2019-12-22 RX ORDER — SIMETHICONE 80 MG/1
80 TABLET, CHEWABLE ORAL
Refills: 0 | Status: DISCONTINUED | OUTPATIENT
Start: 2019-12-22 | End: 2019-12-27

## 2019-12-22 RX ORDER — POTASSIUM CHLORIDE 20 MEQ
20 PACKET (EA) ORAL
Refills: 0 | Status: COMPLETED | OUTPATIENT
Start: 2019-12-22 | End: 2019-12-22

## 2019-12-22 RX ADMIN — SIMETHICONE 80 MILLIGRAM(S): 80 TABLET, CHEWABLE ORAL at 21:22

## 2019-12-22 RX ADMIN — SODIUM CHLORIDE 100 MILLILITER(S): 9 INJECTION, SOLUTION INTRAVENOUS at 06:53

## 2019-12-22 RX ADMIN — Medication 20 MILLIGRAM(S): at 11:04

## 2019-12-22 RX ADMIN — SENNA PLUS 2 TABLET(S): 8.6 TABLET ORAL at 11:04

## 2019-12-22 RX ADMIN — PIPERACILLIN AND TAZOBACTAM 25 GRAM(S): 4; .5 INJECTION, POWDER, LYOPHILIZED, FOR SOLUTION INTRAVENOUS at 13:34

## 2019-12-22 RX ADMIN — LATANOPROST 1 DROP(S): 0.05 SOLUTION/ DROPS OPHTHALMIC; TOPICAL at 21:22

## 2019-12-22 RX ADMIN — DORZOLAMIDE HYDROCHLORIDE 1 DROP(S): 20 SOLUTION/ DROPS OPHTHALMIC at 05:47

## 2019-12-22 RX ADMIN — Medication 120 MILLIGRAM(S): at 05:47

## 2019-12-22 RX ADMIN — MORPHINE SULFATE 2 MILLIGRAM(S): 50 CAPSULE, EXTENDED RELEASE ORAL at 21:31

## 2019-12-22 RX ADMIN — PIPERACILLIN AND TAZOBACTAM 25 GRAM(S): 4; .5 INJECTION, POWDER, LYOPHILIZED, FOR SOLUTION INTRAVENOUS at 21:22

## 2019-12-22 RX ADMIN — CHLORHEXIDINE GLUCONATE 1 APPLICATION(S): 213 SOLUTION TOPICAL at 05:47

## 2019-12-22 RX ADMIN — MORPHINE SULFATE 2 MILLIGRAM(S): 50 CAPSULE, EXTENDED RELEASE ORAL at 21:46

## 2019-12-22 RX ADMIN — HEPARIN SODIUM 11 UNIT(S)/HR: 5000 INJECTION INTRAVENOUS; SUBCUTANEOUS at 14:13

## 2019-12-22 RX ADMIN — Medication 120 MILLIGRAM(S): at 21:22

## 2019-12-22 RX ADMIN — PIPERACILLIN AND TAZOBACTAM 25 GRAM(S): 4; .5 INJECTION, POWDER, LYOPHILIZED, FOR SOLUTION INTRAVENOUS at 05:47

## 2019-12-22 RX ADMIN — Medication 50 MILLIEQUIVALENT(S): at 11:51

## 2019-12-22 RX ADMIN — SODIUM CHLORIDE 100 MILLILITER(S): 9 INJECTION, SOLUTION INTRAVENOUS at 17:04

## 2019-12-22 RX ADMIN — Medication 120 MILLIGRAM(S): at 13:34

## 2019-12-22 RX ADMIN — MORPHINE SULFATE 2 MILLIGRAM(S): 50 CAPSULE, EXTENDED RELEASE ORAL at 10:41

## 2019-12-22 RX ADMIN — Medication 50 MILLIEQUIVALENT(S): at 13:35

## 2019-12-22 RX ADMIN — DORZOLAMIDE HYDROCHLORIDE 1 DROP(S): 20 SOLUTION/ DROPS OPHTHALMIC at 13:34

## 2019-12-22 RX ADMIN — ATORVASTATIN CALCIUM 20 MILLIGRAM(S): 80 TABLET, FILM COATED ORAL at 21:22

## 2019-12-22 RX ADMIN — MORPHINE SULFATE 2 MILLIGRAM(S): 50 CAPSULE, EXTENDED RELEASE ORAL at 10:56

## 2019-12-22 RX ADMIN — DORZOLAMIDE HYDROCHLORIDE 1 DROP(S): 20 SOLUTION/ DROPS OPHTHALMIC at 21:22

## 2019-12-22 NOTE — PROGRESS NOTE ADULT - ASSESSMENT
will cnt  present managment   picc line possible on monday   dc home plan if cleared by ID   WILL FOLLOW   mikey simon MD [phone]

## 2019-12-22 NOTE — PROGRESS NOTE ADULT - SUBJECTIVE AND OBJECTIVE BOX
PANCRAETIC CA WITH BILIARY OBSTRUCTION   has catheter and external drainage by IR  AND APPEARS TAHT IT HELPED   PT Feels betrter and LFTS ARE improving   on antibiotics also

## 2019-12-22 NOTE — PROGRESS NOTE ADULT - ASSESSMENT
Patient seen and examined independently. I personally had a face-to-face encounter with the patient, examined the patient myself and reviewed the plan of care with the housestaff. Agree with Resident's note but my note supersedes the resident's note in matters hereby listed.     84y old Female w/ pmhx of pancreatic CA dx last month on chemotherapy (gemcitabine and abraxane), a.fib on eliquis, DM, HTN, DLD, and colon CA s/p resection who presents 1 week duration of worsening, crampy, RUQ pain  with associated bilious vomiting, decreased PO intake, and chill, but no fevers. She is currently admitted to medicine with the primary diagnosis of Transaminitis 2/2 to obstruction by pancreatic mass. patient is s/p Placement of an 8F external-internal biliary drain on 12/20/19 by IR    #Transaminitis 2/2 to obstruction  - pancreatic cancer diagnosis last month with elevated LFTs at outpt onc office  -Patient is s/p PTC w/ IR  biliary catheter placement, has external drain and s/p brush biopsy on 12/20/19. subjectively better.  - Trend LFTS  -CT abdomen showed 10.6 x 8.4 cm mass involving the 10.6 x 8.4 cm pancreatic head and body w/marked intrahepatic bile duct dilatation and dilatation of the common hepatic duct down to the level of the pancreatic mass    - Fu brush biopsy result   -c/w Morphine PRN for pain    - IR is still following the patient for possibly internalization - ?Monday  Also request PICC on monday, for her outpatient chemo.  Until then c/w hep gtt. maintain ptt    #) Suspected cholangitis in pt with pancreatic CA vs tumor related biliary obstruction   - SIRS criteria met on admission  - Currently afebrile, vitals wnl  - pending blood cultures,   -Per ID: c/w Zosyn 3.75 q8h, and IV fluids NS @75cc/hr     #Atrial fibrillation  -c/w cardizem 120mg TID PO  - Heparin gtt goal 60-90    #Pancreatic cancer  -On gemcitabine and Abraxane  - Rec for picc or port to be placed with IR, pending H/O note to exactly say if they need picc line or chemoport?? IR can do either  - H/O wants picc line currently, contacted IR depending on schedule patient will get it monday or tuesday.  -s/p 1 cycle of chemo on 12/12  -Hem onc following,    #Hypertension  -c/w lisinopril, cardizem    #DM   -Monitor FS and start on insulin if FS > 200    #DLD  -c/w atorvastatin    #HYpokelamiea: Repleted. monitor.    #diet: full liquids  #DVT ppx: SCD   #GI ppx: none  #Dispo: needs picc line vs chemoport .

## 2019-12-22 NOTE — DIETITIAN INITIAL EVALUATION ADULT. - ENERGY NEEDS
Estimated Calorie Needs: [id number] x AF 1.3-1.3=1198-7590cczo/day -Due to cancer  Estimated Protein Needs: 82-88grams/day (1.3-1.4grams/kg of admit weight) -Due to cancer  Estimated Fluid Needs: 1387-1494mL/day (1mL/kcal)

## 2019-12-22 NOTE — DIETITIAN INITIAL EVALUATION ADULT. - OTHER INFO
Dx: 85y/o female with pmhx noted above presented with worsening, crampy, RUQ, constant, RUQ and epigastric abd pain that radiated into her back with associated bilious vomiting, decreased PO intake and chill but no fevers. Admitted to medicine with transaminitis secondary to obstruction by pancreatic mass. S/p placement of an 8F external-internal biliary drain and brush biopsy (12/20). Noted to have cholangitis, SIRS criteria met Skin is intact (Javier Score-16).      Subjective Data: The patient reports following a regular diet at home; usually consumes 75% of meals. However, the patient reports they did not consume meals for the past 7 days prior to admission secondary to poor appetite and food aversion.

## 2019-12-22 NOTE — DIETITIAN INITIAL EVALUATION ADULT. - FACTORS AFF FOOD INTAKE
The patient reports consuming 75% of clear liquid diet. Denies chewing and swallowing difficulty. C/o constipation; a stool softener is provided.

## 2019-12-22 NOTE — DIETITIAN INITIAL EVALUATION ADULT. - DIET TYPE
DASH/TLC (sodium and cholesterol restricted diet)/consistent carbohydrate (evening snack)/high fiber

## 2019-12-22 NOTE — DIETITIAN INITIAL EVALUATION ADULT. - RD TO REMAIN AVAILABLE
Intervention: 1.Meals and Snacks   Monitor/Evaluate: Diet order, energy intake, nutrition focused physical findings, Na, K, CL, glucose, anemia profile/yes

## 2019-12-22 NOTE — DIETITIAN INITIAL EVALUATION ADULT. - PERTINENT LABORATORY DATA
(12/22) Na-130, K-2.8, CL-95, Cr<0.5, Glucose-232mg/dL, POC-211, 187, 200, 193, 198mg/dL, Corrected Ca-9.2 (WNL), H/H-11.4/32, WBC-4.74

## 2019-12-23 LAB
ALBUMIN SERPL ELPH-MCNC: 2.5 G/DL — LOW (ref 3.5–5.2)
ALP SERPL-CCNC: 738 U/L — HIGH (ref 30–115)
ALT FLD-CCNC: 46 U/L — HIGH (ref 0–41)
ANION GAP SERPL CALC-SCNC: 15 MMOL/L — HIGH (ref 7–14)
APTT BLD: 75 SEC — CRITICAL HIGH (ref 27–39.2)
APTT BLD: 82.4 SEC — CRITICAL HIGH (ref 27–39.2)
AST SERPL-CCNC: 19 U/L — SIGNIFICANT CHANGE UP (ref 0–41)
BILIRUB SERPL-MCNC: 1 MG/DL — SIGNIFICANT CHANGE UP (ref 0.2–1.2)
BLD GP AB SCN SERPL QL: SIGNIFICANT CHANGE UP
BUN SERPL-MCNC: 4 MG/DL — LOW (ref 10–20)
CALCIUM SERPL-MCNC: 8.1 MG/DL — LOW (ref 8.5–10.1)
CHLORIDE SERPL-SCNC: 95 MMOL/L — LOW (ref 98–110)
CO2 SERPL-SCNC: 20 MMOL/L — SIGNIFICANT CHANGE UP (ref 17–32)
CREAT SERPL-MCNC: <0.5 MG/DL — LOW (ref 0.7–1.5)
GLUCOSE BLDC GLUCOMTR-MCNC: 213 MG/DL — HIGH (ref 70–99)
GLUCOSE BLDC GLUCOMTR-MCNC: 224 MG/DL — HIGH (ref 70–99)
GLUCOSE BLDC GLUCOMTR-MCNC: 244 MG/DL — HIGH (ref 70–99)
GLUCOSE SERPL-MCNC: 244 MG/DL — HIGH (ref 70–99)
HCT VFR BLD CALC: 34.3 % — LOW (ref 37–47)
HGB BLD-MCNC: 11.9 G/DL — LOW (ref 12–16)
MAGNESIUM SERPL-MCNC: 1.6 MG/DL — LOW (ref 1.8–2.4)
MCHC RBC-ENTMCNC: 32.2 PG — HIGH (ref 27–31)
MCHC RBC-ENTMCNC: 34.7 G/DL — SIGNIFICANT CHANGE UP (ref 32–37)
MCV RBC AUTO: 93 FL — SIGNIFICANT CHANGE UP (ref 81–99)
NRBC # BLD: 0 /100 WBCS — SIGNIFICANT CHANGE UP (ref 0–0)
PLATELET # BLD AUTO: 173 K/UL — SIGNIFICANT CHANGE UP (ref 130–400)
POTASSIUM SERPL-MCNC: 3.1 MMOL/L — LOW (ref 3.5–5)
POTASSIUM SERPL-SCNC: 3.1 MMOL/L — LOW (ref 3.5–5)
PROT SERPL-MCNC: 4.7 G/DL — LOW (ref 6–8)
RBC # BLD: 3.69 M/UL — LOW (ref 4.2–5.4)
RBC # FLD: 15 % — HIGH (ref 11.5–14.5)
SODIUM SERPL-SCNC: 130 MMOL/L — LOW (ref 135–146)
WBC # BLD: 5.57 K/UL — SIGNIFICANT CHANGE UP (ref 4.8–10.8)
WBC # FLD AUTO: 5.57 K/UL — SIGNIFICANT CHANGE UP (ref 4.8–10.8)

## 2019-12-23 PROCEDURE — 99233 SBSQ HOSP IP/OBS HIGH 50: CPT

## 2019-12-23 PROCEDURE — 36573 INSJ PICC RS&I 5 YR+: CPT

## 2019-12-23 RX ORDER — IBUPROFEN 200 MG
400 TABLET ORAL ONCE
Refills: 0 | Status: COMPLETED | OUTPATIENT
Start: 2019-12-23 | End: 2019-12-27

## 2019-12-23 RX ORDER — MAGNESIUM SULFATE 500 MG/ML
2 VIAL (ML) INJECTION ONCE
Refills: 0 | Status: COMPLETED | OUTPATIENT
Start: 2019-12-23 | End: 2019-12-23

## 2019-12-23 RX ORDER — POTASSIUM CHLORIDE 20 MEQ
40 PACKET (EA) ORAL EVERY 4 HOURS
Refills: 0 | Status: COMPLETED | OUTPATIENT
Start: 2019-12-23 | End: 2019-12-23

## 2019-12-23 RX ORDER — APIXABAN 2.5 MG/1
5 TABLET, FILM COATED ORAL
Refills: 0 | Status: DISCONTINUED | OUTPATIENT
Start: 2019-12-23 | End: 2019-12-27

## 2019-12-23 RX ORDER — LANOLIN ALCOHOL/MO/W.PET/CERES
3 CREAM (GRAM) TOPICAL AT BEDTIME
Refills: 0 | Status: DISCONTINUED | OUTPATIENT
Start: 2019-12-23 | End: 2019-12-27

## 2019-12-23 RX ADMIN — Medication 120 MILLIGRAM(S): at 14:59

## 2019-12-23 RX ADMIN — Medication 120 MILLIGRAM(S): at 22:42

## 2019-12-23 RX ADMIN — PIPERACILLIN AND TAZOBACTAM 25 GRAM(S): 4; .5 INJECTION, POWDER, LYOPHILIZED, FOR SOLUTION INTRAVENOUS at 14:59

## 2019-12-23 RX ADMIN — ATORVASTATIN CALCIUM 20 MILLIGRAM(S): 80 TABLET, FILM COATED ORAL at 22:42

## 2019-12-23 RX ADMIN — LATANOPROST 1 DROP(S): 0.05 SOLUTION/ DROPS OPHTHALMIC; TOPICAL at 22:43

## 2019-12-23 RX ADMIN — MORPHINE SULFATE 2 MILLIGRAM(S): 50 CAPSULE, EXTENDED RELEASE ORAL at 03:32

## 2019-12-23 RX ADMIN — Medication 40 MILLIEQUIVALENT(S): at 13:37

## 2019-12-23 RX ADMIN — Medication 40 MILLIEQUIVALENT(S): at 18:51

## 2019-12-23 RX ADMIN — DORZOLAMIDE HYDROCHLORIDE 1 DROP(S): 20 SOLUTION/ DROPS OPHTHALMIC at 22:43

## 2019-12-23 RX ADMIN — Medication 3 MILLIGRAM(S): at 03:11

## 2019-12-23 RX ADMIN — LISINOPRIL 40 MILLIGRAM(S): 2.5 TABLET ORAL at 05:24

## 2019-12-23 RX ADMIN — APIXABAN 5 MILLIGRAM(S): 2.5 TABLET, FILM COATED ORAL at 18:51

## 2019-12-23 RX ADMIN — Medication 40 MILLIEQUIVALENT(S): at 15:00

## 2019-12-23 RX ADMIN — Medication 50 GRAM(S): at 13:28

## 2019-12-23 RX ADMIN — Medication 120 MILLIGRAM(S): at 05:24

## 2019-12-23 RX ADMIN — DORZOLAMIDE HYDROCHLORIDE 1 DROP(S): 20 SOLUTION/ DROPS OPHTHALMIC at 15:00

## 2019-12-23 RX ADMIN — Medication 3 MILLIGRAM(S): at 22:44

## 2019-12-23 RX ADMIN — PIPERACILLIN AND TAZOBACTAM 25 GRAM(S): 4; .5 INJECTION, POWDER, LYOPHILIZED, FOR SOLUTION INTRAVENOUS at 05:25

## 2019-12-23 RX ADMIN — Medication 20 MILLIGRAM(S): at 13:37

## 2019-12-23 RX ADMIN — CHLORHEXIDINE GLUCONATE 1 APPLICATION(S): 213 SOLUTION TOPICAL at 05:25

## 2019-12-23 RX ADMIN — MORPHINE SULFATE 2 MILLIGRAM(S): 50 CAPSULE, EXTENDED RELEASE ORAL at 03:47

## 2019-12-23 RX ADMIN — DORZOLAMIDE HYDROCHLORIDE 1 DROP(S): 20 SOLUTION/ DROPS OPHTHALMIC at 05:24

## 2019-12-23 NOTE — PROGRESS NOTE ADULT - SUBJECTIVE AND OBJECTIVE BOX
S: No new events/complaints      All other pertinent ROS negative.      12-22-19 @ 07:01  -  12-23-19 @ 07:00  --------------------------------------------------------  IN: 2632 mL / OUT: 1920 mL / NET: 712 mL    12-23-19 @ 07:01  -  12-23-19 @ 17:58  --------------------------------------------------------  IN: 88 mL / OUT: 200 mL / NET: -112 mL      Vital Signs Last 24 Hrs  T(C): 36 (23 Dec 2019 12:20), Max: 36.2 (22 Dec 2019 20:36)  T(F): 96.8 (23 Dec 2019 12:20), Max: 97.2 (22 Dec 2019 20:36)  HR: 84 (23 Dec 2019 12:20) (82 - 85)  BP: 105/53 (23 Dec 2019 12:20) (105/53 - 134/68)  BP(mean): --  RR: 18 (23 Dec 2019 12:20) (17 - 18)  SpO2: --  PHYSICAL EXAM:    Constitutional: NAD, awake and alert, well-developed  HEENT: PERR, EOMI, Normal Hearing, MMM  Neck: Soft and supple, No LAD, No JVD  Respiratory: Breath sounds are clear bilaterally, No wheezing, rales or rhonchi  Cardiovascular: S1 and S2, regular rate and rhythm, no Murmurs, gallops or rubs  Gastrointestinal: Bowel Sounds present, soft, nontender, nondistended, no guarding, no rebound.  Drain noted with bilious drainage  Extremities: No peripheral edema      MEDICATIONS:  MEDICATIONS  (STANDING):  apixaban 5 milliGRAM(s) Oral two times a day  atorvastatin 20 milliGRAM(s) Oral at bedtime  chlorhexidine 4% Liquid 1 Application(s) Topical <User Schedule>  diltiazem    Tablet 120 milliGRAM(s) Oral three times a day  dorzolamide 2% Ophthalmic Solution 1 Drop(s) Left EYE three times a day  FLUoxetine 20 milliGRAM(s) Oral daily  heparin  Infusion 1100 Unit(s)/Hr (11 mL/Hr) IV Continuous <Continuous>  influenza   Vaccine 0.5 milliLiter(s) IntraMuscular once  latanoprost 0.005% Ophthalmic Solution 1 Drop(s) Right EYE at bedtime  lisinopril 40 milliGRAM(s) Oral daily  melatonin 3 milliGRAM(s) Oral at bedtime  potassium chloride    Tablet ER 40 milliEquivalent(s) Oral every 4 hours  senna 2 Tablet(s) Oral daily      LABS: All Labs Reviewed:                        11.9   5.57  )-----------( 173      ( 23 Dec 2019 05:36 )             34.3     12-23    130<L>  |  95<L>  |  4<L>  ----------------------------<  244<H>  3.1<L>   |  20  |  <0.5<L>    Ca    8.1<L>      23 Dec 2019 05:36  Mg     1.6     12-23    TPro  4.7<L>  /  Alb  2.5<L>  /  TBili  1.0  /  DBili  x   /  AST  19  /  ALT  46<H>  /  AlkPhos  738<H>  12-23    PTT - ( 23 Dec 2019 05:36 )  PTT:82.4 sec      Blood Culture: 12-20 @ 10:00  Organism --  Gram Stain Blood -- Gram Stain   No polymorphonuclear cells seen per low power field  No organisms seen per oil power field  Specimen Source Bile None  Culture-Blood --    12-20 @ 04:30  Organism --  Gram Stain Blood -- Gram Stain --  Specimen Source .Blood Blood  Culture-Blood --    12-19 @ 05:48  Organism --  Gram Stain Blood -- Gram Stain --  Specimen Source .Blood None  Culture-Blood --        Radiology: reviewed

## 2019-12-23 NOTE — PROGRESS NOTE ADULT - SUBJECTIVE AND OBJECTIVE BOX
Interventional Radiology Follow- Up Note    84F s/p PTC with external-internal drain and brush biopsy with Dr. Clay on 12/20/19. No acute overnight events. Feels well overall.    Vitals: T(F): 96.7 (12-23-19 @ 05:07), Max: 97.3 (12-22-19 @ 13:33)  HR: 82 (12-23-19 @ 05:07) (82 - 95)  BP: 134/68 (12-23-19 @ 05:07) (100/57 - 134/68)  RR: 18 (12-23-19 @ 05:07) (17 - 18)  SpO2: --  Wt(kg): --    LABS:                        11.9   5.57  )-----------( 173      ( 23 Dec 2019 05:36 )             34.3     12-23    130<L>  |  95<L>  |  4<L>  ----------------------------<  244<H>  3.1<L>   |  20  |  <0.5<L>    Ca    8.1<L>      23 Dec 2019 05:36  Mg     1.6     12-23    TPro  4.7<L>  /  Alb  2.5<L>  /  TBili  1.0  /  DBili  x   /  AST  19  /  ALT  46<H>  /  AlkPhos  738<H>  12-23    PTT - ( 23 Dec 2019 05:36 )  PTT:82.4 sec    Culture:   output :    PHYSICAL EXAM:  General: nontoxic, NAD  CV: normal pulse  Lung: breathing comfortably on room air  Abdomen: soft, nondistended, nontender. External biliary drain to bag with bilious fluid.  Extremities: no pedal edema or calf tenderness noted     ASSESSMENT AND PLAN  Clinically nontoxic and hemodynamically stable. Bilirubin is completely normalized. Cytopathology results are still pending - discussed directly with pathology. Internalization of the drain is the next step but is contingent on adequacy of the brush biopsy as a negative or equivocal result would prompt consideration of repeat biopsy prior to internalization.   - From IR perspective, the patient can be discharged and follow up for internalization +/- repeat brush on outpatient basis.   - Alternatively, if the patient is not medically cleared for discharge, we may schedule for procedure this Thursday or Friday pending finalization of cytology.   - Plan discussed with medicine resident.       Please call Interventional Radiology [phone] with any questions, concerns, or issues regarding above. Interventional Radiology Follow- Up Note    84F s/p PTC with external-internal drain and brush biopsy with Dr. Clay on 12/20/19. No acute overnight events. Feels well overall.    Vitals: T(F): 96.7 (12-23-19 @ 05:07), Max: 97.3 (12-22-19 @ 13:33)  HR: 82 (12-23-19 @ 05:07) (82 - 95)  BP: 134/68 (12-23-19 @ 05:07) (100/57 - 134/68)  RR: 18 (12-23-19 @ 05:07) (17 - 18)  SpO2: --  Wt(kg): --    LABS:                        11.9   5.57  )-----------( 173      ( 23 Dec 2019 05:36 )             34.3     12-23    130<L>  |  95<L>  |  4<L>  ----------------------------<  244<H>  3.1<L>   |  20  |  <0.5<L>    Ca    8.1<L>      23 Dec 2019 05:36  Mg     1.6     12-23    TPro  4.7<L>  /  Alb  2.5<L>  /  TBili  1.0  /  DBili  x   /  AST  19  /  ALT  46<H>  /  AlkPhos  738<H>  12-23    PTT - ( 23 Dec 2019 05:36 )  PTT:82.4 sec    Culture:   output :    PHYSICAL EXAM:  General: nontoxic, NAD  CV: normal pulse  Lung: breathing comfortably on room air  Abdomen: soft, nondistended, nontender. External biliary drain to bag with bilious fluid.  Extremities: no pedal edema or calf tenderness noted     ASSESSMENT AND PLAN  Clinically nontoxic and hemodynamically stable. Bilirubin is completely normalized. Cytopathology results are still pending - discussed directly with pathology. Internalization of the drain is the next step but is contingent on adequacy of the brush biopsy as a negative or equivocal result would prompt consideration of repeat biopsy prior to internalization.   - Cap trial starting today. Instructions discussed directly with daughter and given drainage bag.   - From IR perspective, the patient can be discharged and follow up for internalization +/- repeat brush on outpatient basis.   - Alternatively, if the patient is not medically cleared for discharge, we may schedule for procedure this Thursday or Friday pending finalization of cytology.   - Plan discussed with medicine resident.       Please call Interventional Radiology [phone] with any questions, concerns, or issues regarding above. Interventional Radiology Follow- Up Note    84F s/p PTC with external-internal drain and brush biopsy with Dr. Clay on 12/20/19. No acute overnight events. Feels well overall.    Vitals: T(F): 96.7 (12-23-19 @ 05:07), Max: 97.3 (12-22-19 @ 13:33)  HR: 82 (12-23-19 @ 05:07) (82 - 95)  BP: 134/68 (12-23-19 @ 05:07) (100/57 - 134/68)  RR: 18 (12-23-19 @ 05:07) (17 - 18)  SpO2: --  Wt(kg): --    LABS:                        11.9   5.57  )-----------( 173      ( 23 Dec 2019 05:36 )             34.3     12-23    130<L>  |  95<L>  |  4<L>  ----------------------------<  244<H>  3.1<L>   |  20  |  <0.5<L>    Ca    8.1<L>      23 Dec 2019 05:36  Mg     1.6     12-23    TPro  4.7<L>  /  Alb  2.5<L>  /  TBili  1.0  /  DBili  x   /  AST  19  /  ALT  46<H>  /  AlkPhos  738<H>  12-23    PTT - ( 23 Dec 2019 05:36 )  PTT:82.4 sec    Culture:   output :    PHYSICAL EXAM:  General: nontoxic, NAD  CV: normal pulse  Lung: breathing comfortably on room air  Abdomen: soft, nondistended, nontender. External biliary drain to bag with bilious fluid.  Extremities: no pedal edema or calf tenderness noted     ASSESSMENT AND PLAN  Clinically nontoxic and hemodynamically stable. Bilirubin is completely normalized. Cytopathology results are still pending - discussed directly with pathology. Internalization of the drain is the next step but is contingent on adequacy of the brush biopsy as a negative or equivocal result would prompt consideration of repeat biopsy prior to internalization.   - Cap trial starting today. Instructions discussed in detail with directly with daughter and given drainage bag.   - From IR perspective, the patient can be discharged and follow up for internalization +/- repeat brush on outpatient basis.   - Alternatively, if the patient is not medically cleared for discharge, we may schedule for procedure this Thursday or Friday pending finalization of cytology.   - Plan discussed with medicine resident.       Please call Interventional Radiology [phone] with any questions, concerns, or issues regarding above.

## 2019-12-23 NOTE — PROGRESS NOTE ADULT - ASSESSMENT
Patient seen and examined independently. I personally had a face-to-face encounter with the patient, examined the patient myself and reviewed the plan of care with the housestaff. Agree with Resident's note but my note supersedes the resident's note in matters hereby listed.     84y old Female w/ pmhx of pancreatic CA dx last month on chemotherapy (gemcitabine and abraxane), a.fib on eliquis, DM, HTN, DLD, and colon CA s/p resection who presents 1 week duration of worsening, crampy, RUQ pain  with associated bilious vomiting, decreased PO intake, and chill, but no fevers. She is currently admitted to medicine with the primary diagnosis of Transaminitis 2/2 to obstruction by pancreatic mass. patient is s/p Placement of an 8F external-internal biliary drain on 12/20/19 by IR    #Transaminitis 2/2 to obstruction  - pancreatic cancer with elevated LFTs at outpt onc office  -Patient is s/p PTC w/ IR  biliary catheter placement, has external drain and s/p brush biopsy on 12/20/19. subjectively better.  - LFTs improving.   Plan to d/c home tomorrow. Outpatient onc f/u after brush biopsy results. IF no more brush biopsy planned, IR will do internalization of drain outpatient.       -CT abdomen showed 10.6 x 8.4 cm mass involving the 10.6 x 8.4 cm pancreatic head and body w/marked intrahepatic bile duct dilatation and dilatation of the common hepatic duct down to the level of the pancreatic mass      #) Suspected cholangitis in pt with pancreatic CA vs tumor related biliary obstruction   - SIRS criteria met on admission. No infection found. so NOT SEPSIS.  - Currently afebrile, vitals wnl  - blood cultures NGTD.   Per ID, ok to d/c abx.       #Atrial fibrillation  -c/w cardizem 120mg TID PO  - change from Heparin gtt back to her eliquis on 12/23 evening.     #Pancreatic cancer  -On gemcitabine and Abraxane  -s/p 1 cycle of chemo on 12/12  -Hem onc following,  Got PICC for chemo on 12/23.   confirm from Dr. Nielsen Chemo regimen and plan for d/c to SNF tomorrow.     #Hypertension  -c/w lisinopril, cardizem    #DM   -Monitor FS and start on insulin if FS > 200    #DLD  -c/w atorvastatin    #HYpokelamiea: Repleted. monitor.    #diet: full liquids  #DVT ppx: SCD   #GI ppx: none  #Dispo: Probable SNF tomorrow. Patient seen and examined independently. I personally had a face-to-face encounter with the patient, examined the patient myself and reviewed the plan of care with the housestaff. Agree with Resident's note but my note supersedes the resident's note in matters hereby listed.     84y old Female w/ pmhx of pancreatic CA dx last month on chemotherapy (gemcitabine and abraxane), a.fib on eliquis, DM, HTN, DLD, and colon CA s/p resection who presents 1 week duration of worsening, crampy, RUQ pain  with associated bilious vomiting, decreased PO intake, and chill, but no fevers. She is currently admitted to medicine with the primary diagnosis of Transaminitis 2/2 to obstruction by pancreatic mass. patient is s/p Placement of an 8F external-internal biliary drain on 12/20/19 by IR    #Transaminitis 2/2 to obstruction  - pancreatic cancer with elevated LFTs at outpt onc office  -Patient is s/p PTC w/ IR  biliary catheter placement, has external drain and s/p brush biopsy on 12/20/19. subjectively better.  - LFTs improving.   Plan to d/c home tomorrow. Outpatient onc f/u after brush biopsy results. IF no more brush biopsy planned, IR will do internalization of drain outpatient.       -CT abdomen showed 10.6 x 8.4 cm mass involving the 10.6 x 8.4 cm pancreatic head and body w/marked intrahepatic bile duct dilatation and dilatation of the common hepatic duct down to the level of the pancreatic mass      #) Suspected cholangitis in pt with pancreatic CA vs tumor related biliary obstruction   - SIRS criteria met on admission. No infection found. so NOT SEPSIS.  - Currently afebrile, vitals wnl  - blood cultures NGTD.   Per ID, ok to d/c abx.       #Atrial fibrillation  -c/w cardizem 120mg TID PO  - change from Heparin gtt back to her eliquis on 12/23 evening.     #Pancreatic cancer  -On gemcitabine and Abraxane  -s/p 1 cycle of chemo on 12/12  -Hem onc following,  Got PICC for chemo on 12/23.   confirm from Dr. Nielsen Chemo regimen and plan for d/c to SNF tomorrow.     #Hypertension  -c/w lisinopril, cardizem    #DM   -Monitor FS and start on insulin if FS > 200    #DLD  -c/w atorvastatin    #HYpokelamiea: Repleted. monitor.    PATIENT DOES NOT FULFIL MALNUTRITION CRITERIA    #diet: full liquids  #DVT ppx: SCD   #GI ppx: none  #Dispo: Probable SNF tomorrow.

## 2019-12-23 NOTE — PROCEDURE NOTE - NSINFORMCONSENT_GEN_A_CORE
Benefits, risks, and possible complications of procedure explained to patient/caregiver who verbalized understanding and gave written consent./HCP - pts daughter, verbal consent from pt

## 2019-12-23 NOTE — PROCEDURE NOTE - NSPOSTCAREGUIDE_GEN_A_CORE
Verbal/written post procedure instructions were given to patient/caregiver/Keep the cast/splint/dressing clean and dry/Care for catheter as per unit/ICU protocols/Instructed patient/caregiver to follow-up with primary care physician/Instructed patient/caregiver regarding signs and symptoms of infection

## 2019-12-23 NOTE — MEDICAL STUDENT PROGRESS NOTE(EDUCATION) - SUBJECTIVE AND OBJECTIVE BOX
ZHAO KUMAR 84y Female  MRN#: [id number]   Hospital Day: 5d    SUBJECTIVE  Patient is a 84y old Female w/ pmhx of pancreatic CA dx last month on chemotherapy (gemcitabine and abraxane), a.fib on eliquis, DM, HTN, DLD, and colon CA s/p resection who presents with a chief complaint of over 1 week duration worsening, crampy, RUQ, 8/10, constant, RUQ and epigastric abd pain that radiated into her back with associated bilious vomiting, decreased PO intake, and chill, but no fevers. The patient went to see her oncologist, Dr. Vela last week and was informed that blood tests showed elevated LFTs. She was sent to the ED and it was discovered that she had AST-500, and ALT- 213, alk phos - 1416, direct bili - 5.4 (total 5.9), and lipase at 6. CT showed no intestinal obstruction, but marked intrahepatic bile duct dilation, and dilation of the common hepatic duct down to the level of the 10.6 x 8.4 cm mass. Pt was also in a.fib with RVR with a HR of 122. She was given 10 mg cardizem IV and 60 mg PO cardizem.     She is currently admitted to medicine with the primary diagnosis of Transaminitis 2/2 to obstruction by pancreatic mass. Today is hospital day 5.     INTERVAL HPI AND OVERNIGHT EVENTS:  Patient was examined and seen at bedside. This morning she is resting comfortably in bed and reports no issues or overnight events. The patient says she's been having a lot of gas pain, but otherwise has no other complaint. External drain is draining some serosangious fluid. The patient had trouble voiding over the weekend and a moreno was placed yesterday. Reports that she has not been ambulating much, but does plan to get out of bed today.       REVIEW OF SYMPTOMS:  CONSTITUTIONAL: No weakness, fevers or chills; No headaches  EYES: No visual changes, eye pain, or discharge  ENT: No vertigo; No ear pain or change in hearing; No sore throat or difficulty swallowing  NECK: No pain or stiffness  RESPIRATORY: No cough, wheezing, or hemoptysis; No shortness of breath  CARDIOVASCULAR: No chest pain or palpitations  GASTROINTESTINAL: +gas pain, No nausea, vomiting, or hematemesis; No diarrhea or constipation; No melena or hematochezia  GENITOURINARY: No dysuria, frequency or hematuria  MUSCULOSKELETAL: No joint pain, no muscle pain, no weakness  NEUROLOGICAL: No numbness or weakness  SKIN: No itching or rashes    OBJECTIVE  PAST MEDICAL & SURGICAL HISTORY  Pancreatic cancer  Anxiety  Hypercholesteremia  Afib  DM (diabetes mellitus)  Acquired cataract  Abnormal serum cholesterol  Generalized OA  Mildly obese  H/O: HTN (hypertension)  History of tonsillectomy  History of cataract surgery  History of colon resection  History of cholecystectomy  H/O hernia repair: abdomen    ALLERGIES:  No Known Allergies    MEDICATIONS:  STANDING MEDICATIONS  atorvastatin 20 milliGRAM(s) Oral at bedtime  chlorhexidine 4% Liquid 1 Application(s) Topical <User Schedule>  dextrose 5% + sodium chloride 0.45%. 1000 milliLiter(s) IV Continuous <Continuous>  diltiazem    Tablet 120 milliGRAM(s) Oral three times a day  dorzolamide 2% Ophthalmic Solution 1 Drop(s) Left EYE three times a day  FLUoxetine 20 milliGRAM(s) Oral daily  heparin  Infusion 1100 Unit(s)/Hr IV Continuous <Continuous>  influenza   Vaccine 0.5 milliLiter(s) IntraMuscular once  latanoprost 0.005% Ophthalmic Solution 1 Drop(s) Right EYE at bedtime  lisinopril 40 milliGRAM(s) Oral daily  melatonin 3 milliGRAM(s) Oral at bedtime  piperacillin/tazobactam IVPB.. 3.375 Gram(s) IV Intermittent every 8 hours  potassium chloride    Tablet ER 40 milliEquivalent(s) Oral every 4 hours  senna 2 Tablet(s) Oral daily    PRN MEDICATIONS  morphine  - Injectable 2 milliGRAM(s) IV Push every 6 hours PRN  simethicone 80 milliGRAM(s) Chew two times a day PRN      VITAL SIGNS: Last 24 Hours  T(C): 35.9 (23 Dec 2019 05:07), Max: 36.3 (22 Dec 2019 13:33)  T(F): 96.7 (23 Dec 2019 05:07), Max: 97.3 (22 Dec 2019 13:33)  HR: 82 (23 Dec 2019 05:07) (82 - 95)  BP: 134/68 (23 Dec 2019 05:07) (100/57 - 134/68)  BP(mean): --  RR: 18 (23 Dec 2019 05:07) (17 - 18)  SpO2: --    LABS:                        11.9   5.57  )-----------( 173      ( 23 Dec 2019 05:36 )             34.3     12    130<L>  |  95<L>  |  4<L>  ----------------------------<  244<H>  3.1<L>   |  20  |  <0.5<L>    Ca    8.1<L>      23 Dec 2019 05:36    TPro  4.7<L>  /  Alb  2.5<L>  /  TBili  1.0  /  DBili  x   /  AST  19  /  ALT  46<H>  /  AlkPhos  738<H>  12    PTT - ( 23 Dec 2019 05:36 )  PTT:82.4 sec  Urinalysis Basic - ( 22 Dec 2019 05:10 )    Color: Yellow / Appearance: Clear / S.017 / pH: x  Gluc: x / Ketone: Negative  / Bili: Negative / Urobili: <2 mg/dL   Blood: x / Protein: Trace / Nitrite: Negative   Leuk Esterase: Negative / RBC: x / WBC x   Sq Epi: x / Non Sq Epi: x / Bacteria: x        Culture - Body Fluid with Gram Stain (collected 20 Dec 2019 10:00)  Source: Bile None  Gram Stain (20 Dec 2019 22:26):    No polymorphonuclear cells seen per low power field    No organisms seen per oil power field  Preliminary Report (21 Dec 2019 17:46):    No growth      RADIOLOGY:   CT Abd/Pelvis - IMPRESSION: The previously noted mass involving the pancreatic head and body measuring 5.6 x 6.9 cm on the previous PET/CT scan, now measures 10.6 x 8.4 cm. Marked intrahepatic bile duct dilatation and dilatation of the common hepatic duct down to the level of the pancreatic mass.  PHYSICAL EXAM:  CONSTITUTIONAL: No acute distress, well-developed, well-groomed, AAOx3  HEAD: Atraumatic, normocephalic  EYES: EOM intact, conjunctiva and sclera clear  ENT: Supple, no masses, no JVD; moist mucous membranes  PULMONARY: Clear to auscultation bilaterally; no wheezes, rales, or rhonchi  CARDIOVASCULAR: Regular rate and rhythm; no murmurs, rubs, or gallops  GASTROINTESTINAL: Soft, non-tender, mildly distended, bowel sounds present  MUSCULOSKELETAL: 2+ peripheral pulses; no clubbing, no cyanosis, no edema  NEUROLOGY: non-focal  SKIN: No rashes or lesions; warm and dry    ASSESSMENT & PLAN  Patient is a 84y old Female w/ pmhx of pancreatic CA dx last month on chemotherapy (gemcitabine and abraxane), a.fib on eliquis, DM, HTN, DLD, and colon CA s/p resection who presents with a chief complaint of over 1 week duration of worsening, crampy, RUQ, 8/10, constant, RUQ and epigastric abd pain that radiated into her back with associated bilious vomiting, decreased PO intake, and chill, but no fevers. She is currently admitted to medicine with the primary diagnosis of Transaminitis 2/2 to obstruction by pancreatic mass.    Pending/update: F/u IR if pt going for PICC and/or drain internalization today, f/u lab for pending cultures, f/u ID regarding zosyn if cultures updated and reflect as negative, replete potassium    #Transaminitis 2/2 to obstruction  - pancreatic cancer diagnosis last month with elevated LFTs at outpt onc office  - LFTs improved AST- 23, ALT - 56   - Pain improvement following PTC on   - Fu brush biopsy result   -c/w Morphine PRN for pain  - IR is still following the patient for possibly drain internalization, thus will keep the patient on heparin drip    #) Suspected cholangitis in pt with pancreatic CA vs tumor related biliary obstruction   - SIRS criteria met on admission  - Currently afebrile, WBC - 5.57 today, vitals wnl  - F/u lab re: pending blood cultures, UA  - F/u ID regarding Zosyn if updated cultures are neg    #) Hypokalemia  - Potassium 3.1 today, replete     #Atrial fibrillation  -c/w cardizem 120mg TID PO  - Heparin gtt goal 60-90    #Pancreatic cancer  -On gemcitabine and Abraxane  - F/U regarding PICC placement with IR today  -s/p 1 cycle of chemo on   -Hem onc following    #Hypertension  -c/w lisinopril, cardizem    #DM   -Monitor FS and start on insulin if FS > 200    #DLD  -c/w atorvastatin    #diet: Clear liquid  #DVT ppx: SCD   #GI ppx: none  #Dispo: D/C home if cleared by ID, and PICC placement w/ outpt onc f/u ZHAO KUMAR 84y Female  MRN#: [id number]   Hospital Day: 5d    SUBJECTIVE  Patient is a 84y old Female w/ pmhx of pancreatic CA dx last month on chemotherapy (gemcitabine and abraxane), a.fib on eliquis, DM, HTN, DLD, and colon CA s/p resection who presents with a chief complaint of over 1 week duration worsening, crampy, RUQ, 8/10, constant, RUQ and epigastric abd pain that radiated into her back with associated bilious vomiting, decreased PO intake, and chill, but no fevers. The patient went to see her oncologist, Dr. Vela last week and was informed that blood tests showed elevated LFTs. She was sent to the ED and it was discovered that she had AST-500, and ALT- 213, alk phos - 1416, direct bili - 5.4 (total 5.9), and lipase at 6. CT showed no intestinal obstruction, but marked intrahepatic bile duct dilation, and dilation of the common hepatic duct down to the level of the 10.6 x 8.4 cm mass. Pt was also in a.fib with RVR with a HR of 122. She was given 10 mg cardizem IV and 60 mg PO cardizem.     She is currently admitted to medicine with the primary diagnosis of Transaminitis 2/2 to obstruction by pancreatic mass. Today is hospital day 5.     INTERVAL HPI AND OVERNIGHT EVENTS:  Patient was examined and seen at bedside. This morning she is resting comfortably in bed and reports no issues or overnight events. The patient says she's been having a lot of gas pain, but otherwise has no other complaint. External drain is draining some serosangious fluid. The patient had trouble voiding over the weekend and a moreno was placed yesterday. Reports that she has not been ambulating much, but does plan to get out of bed today.       REVIEW OF SYMPTOMS:  CONSTITUTIONAL: No weakness, fevers or chills; No headaches  EYES: No visual changes, eye pain, or discharge  ENT: No vertigo; No ear pain or change in hearing; No sore throat or difficulty swallowing  NECK: No pain or stiffness  RESPIRATORY: No cough, wheezing, or hemoptysis; No shortness of breath  CARDIOVASCULAR: No chest pain or palpitations  GASTROINTESTINAL: +gas pain, No nausea, vomiting, or hematemesis; No diarrhea or constipation; No melena or hematochezia  GENITOURINARY: No dysuria, frequency or hematuria  MUSCULOSKELETAL: No joint pain, no muscle pain, no weakness  NEUROLOGICAL: No numbness or weakness  SKIN: No itching or rashes    OBJECTIVE  PAST MEDICAL & SURGICAL HISTORY  Pancreatic cancer  Anxiety  Hypercholesteremia  Afib  DM (diabetes mellitus)  Acquired cataract  Abnormal serum cholesterol  Generalized OA  Mildly obese  H/O: HTN (hypertension)  History of tonsillectomy  History of cataract surgery  History of colon resection  History of cholecystectomy  H/O hernia repair: abdomen    ALLERGIES:  No Known Allergies    MEDICATIONS:  STANDING MEDICATIONS  atorvastatin 20 milliGRAM(s) Oral at bedtime  chlorhexidine 4% Liquid 1 Application(s) Topical <User Schedule>  dextrose 5% + sodium chloride 0.45%. 1000 milliLiter(s) IV Continuous <Continuous>  diltiazem    Tablet 120 milliGRAM(s) Oral three times a day  dorzolamide 2% Ophthalmic Solution 1 Drop(s) Left EYE three times a day  FLUoxetine 20 milliGRAM(s) Oral daily  heparin  Infusion 1100 Unit(s)/Hr IV Continuous <Continuous>  influenza   Vaccine 0.5 milliLiter(s) IntraMuscular once  latanoprost 0.005% Ophthalmic Solution 1 Drop(s) Right EYE at bedtime  lisinopril 40 milliGRAM(s) Oral daily  melatonin 3 milliGRAM(s) Oral at bedtime  piperacillin/tazobactam IVPB.. 3.375 Gram(s) IV Intermittent every 8 hours  potassium chloride    Tablet ER 40 milliEquivalent(s) Oral every 4 hours  senna 2 Tablet(s) Oral daily    PRN MEDICATIONS  morphine  - Injectable 2 milliGRAM(s) IV Push every 6 hours PRN  simethicone 80 milliGRAM(s) Chew two times a day PRN      VITAL SIGNS: Last 24 Hours  T(C): 35.9 (23 Dec 2019 05:07), Max: 36.3 (22 Dec 2019 13:33)  T(F): 96.7 (23 Dec 2019 05:07), Max: 97.3 (22 Dec 2019 13:33)  HR: 82 (23 Dec 2019 05:07) (82 - 95)  BP: 134/68 (23 Dec 2019 05:07) (100/57 - 134/68)  BP(mean): --  RR: 18 (23 Dec 2019 05:07) (17 - 18)  SpO2: --    LABS:                        11.9   5.57  )-----------( 173      ( 23 Dec 2019 05:36 )             34.3     12    130<L>  |  95<L>  |  4<L>  ----------------------------<  244<H>  3.1<L>   |  20  |  <0.5<L>    Ca    8.1<L>      23 Dec 2019 05:36    TPro  4.7<L>  /  Alb  2.5<L>  /  TBili  1.0  /  DBili  x   /  AST  19  /  ALT  46<H>  /  AlkPhos  738<H>  12    PTT - ( 23 Dec 2019 05:36 )  PTT:82.4 sec  Urinalysis Basic - ( 22 Dec 2019 05:10 )    Color: Yellow / Appearance: Clear / S.017 / pH: x  Gluc: x / Ketone: Negative  / Bili: Negative / Urobili: <2 mg/dL   Blood: x / Protein: Trace / Nitrite: Negative   Leuk Esterase: Negative / RBC: x / WBC x   Sq Epi: x / Non Sq Epi: x / Bacteria: x        Culture - Body Fluid with Gram Stain (collected 20 Dec 2019 10:00)  Source: Bile None  Gram Stain (20 Dec 2019 22:26):    No polymorphonuclear cells seen per low power field    No organisms seen per oil power field  Preliminary Report (21 Dec 2019 17:46):    No growth      RADIOLOGY:   CT Abd/Pelvis - IMPRESSION: The previously noted mass involving the pancreatic head and body measuring 5.6 x 6.9 cm on the previous PET/CT scan, now measures 10.6 x 8.4 cm. Marked intrahepatic bile duct dilatation and dilatation of the common hepatic duct down to the level of the pancreatic mass.  PHYSICAL EXAM:  CONSTITUTIONAL: No acute distress, well-developed, well-groomed, AAOx3  HEAD: Atraumatic, normocephalic  EYES: EOM intact, conjunctiva and sclera clear  ENT: Supple, no masses, no JVD; moist mucous membranes  PULMONARY: Clear to auscultation bilaterally; no wheezes, rales, or rhonchi  CARDIOVASCULAR: Regular rate and rhythm; no murmurs, rubs, or gallops  GASTROINTESTINAL: Soft, non-tender, mildly distended, bowel sounds present  MUSCULOSKELETAL: 2+ peripheral pulses; no clubbing, no cyanosis, no edema  NEUROLOGY: non-focal  SKIN: No rashes or lesions; warm and dry    ASSESSMENT & PLAN  Patient is a 84y old Female w/ pmhx of pancreatic CA dx last month on chemotherapy (gemcitabine and abraxane), a.fib on eliquis, DM, HTN, DLD, and colon CA s/p resection who presents with a chief complaint of over 1 week duration of worsening, crampy, RUQ, 8/10, constant, RUQ and epigastric abd pain that radiated into her back with associated bilious vomiting, decreased PO intake, and chill, but no fevers. She is currently admitted to medicine with the primary diagnosis of Transaminitis 2/2 to obstruction by pancreatic mass.    Pending/update: F/u IR if pt going for PICC and/or drain internalization today, f/u lab for pending cultures, f/u ID regarding zosyn if cultures updated and reflect as negative, replete potassium    #Transaminitis 2/2 to obstruction  - pancreatic cancer diagnosis last month with elevated LFTs at outpt onc office  - LFTs improved AST- 23, ALT - 56   - Pain improvement following PTC on   - Fu brush biopsy result   -c/w Morphine PRN for pain  - IR is still following the patient for possibly drain internalization, thus will keep the patient on heparin drip    #) Suspected cholangitis in pt with pancreatic CA vs tumor related biliary obstruction   - SIRS criteria met on admission  - Currently afebrile, WBC - 5.57 today, vitals wnl  - Pending blood cultures, UA  - ID rec Zosyn could change to po Levo 500 mg q24h and po Flagyl 500 mg q12h for 5 more days    #) Hypokalemia  - Potassium 3.1 today, replete     #Atrial fibrillation  -c/w cardizem 120mg TID PO  - Heparin gtt goal 60-90    #Pancreatic cancer  -On gemcitabine and Abraxane  - F/U regarding PICC placement with IR today  -s/p 1 cycle of chemo on   -Hem onc following    #Hypertension  -c/w lisinopril, cardizem    #DM   -Monitor FS and start on insulin if FS > 200    #DLD  -c/w atorvastatin    #diet: Clear liquid  #DVT ppx: SCD   #GI ppx: none  #Dispo: D/C home following PICC placement and when cleared by ID, w/ outpt onc f/u ZHAO KUMAR 84y Female  MRN#: [id number]   Hospital Day: 5d    SUBJECTIVE  Patient is a 84y old Female w/ pmhx of pancreatic CA dx last month on chemotherapy (gemcitabine and abraxane), a.fib on eliquis, DM, HTN, DLD, and colon CA s/p resection who presents with a chief complaint of over 1 week duration worsening, crampy, RUQ, 8/10, constant, RUQ and epigastric abd pain that radiated into her back with associated bilious vomiting, decreased PO intake, and chill, but no fevers. The patient went to see her oncologist, Dr. Vela last week and was informed that blood tests showed elevated LFTs. She was sent to the ED and it was discovered that she had AST-500, and ALT- 213, alk phos - 1416, direct bili - 5.4 (total 5.9), and lipase at 6. CT showed no intestinal obstruction, but marked intrahepatic bile duct dilation, and dilation of the common hepatic duct down to the level of the 10.6 x 8.4 cm mass. Pt was also in a.fib with RVR with a HR of 122. She was given 10 mg cardizem IV and 60 mg PO cardizem.     She is currently admitted to medicine with the primary diagnosis of Transaminitis 2/2 to obstruction by pancreatic mass. Today is hospital day 5.     INTERVAL HPI AND OVERNIGHT EVENTS:  Patient was examined and seen at bedside. This morning she is resting comfortably in bed and reports no issues or overnight events. The patient says she's been having a lot of gas pain, but otherwise has no other complaint. External drain is draining some serosangious fluid. The patient had trouble voiding over the weekend and a moreno was placed yesterday. Reports that she has not been ambulating much, but does plan to get out of bed today.       REVIEW OF SYMPTOMS:  CONSTITUTIONAL: No weakness, fevers or chills; No headaches  EYES: No visual changes, eye pain, or discharge  ENT: No vertigo; No ear pain or change in hearing; No sore throat or difficulty swallowing  NECK: No pain or stiffness  RESPIRATORY: No cough, wheezing, or hemoptysis; No shortness of breath  CARDIOVASCULAR: No chest pain or palpitations  GASTROINTESTINAL: +gas pain, No nausea, vomiting, or hematemesis; No diarrhea or constipation; No melena or hematochezia  GENITOURINARY: No dysuria, frequency or hematuria  MUSCULOSKELETAL: No joint pain, no muscle pain, no weakness  NEUROLOGICAL: No numbness or weakness  SKIN: No itching or rashes    OBJECTIVE  PAST MEDICAL & SURGICAL HISTORY  Pancreatic cancer  Anxiety  Hypercholesteremia  Afib  DM (diabetes mellitus)  Acquired cataract  Abnormal serum cholesterol  Generalized OA  Mildly obese  H/O: HTN (hypertension)  History of tonsillectomy  History of cataract surgery  History of colon resection  History of cholecystectomy  H/O hernia repair: abdomen    ALLERGIES:  No Known Allergies    MEDICATIONS:  STANDING MEDICATIONS  atorvastatin 20 milliGRAM(s) Oral at bedtime  chlorhexidine 4% Liquid 1 Application(s) Topical <User Schedule>  dextrose 5% + sodium chloride 0.45%. 1000 milliLiter(s) IV Continuous <Continuous>  diltiazem    Tablet 120 milliGRAM(s) Oral three times a day  dorzolamide 2% Ophthalmic Solution 1 Drop(s) Left EYE three times a day  FLUoxetine 20 milliGRAM(s) Oral daily  heparin  Infusion 1100 Unit(s)/Hr IV Continuous <Continuous>  influenza   Vaccine 0.5 milliLiter(s) IntraMuscular once  latanoprost 0.005% Ophthalmic Solution 1 Drop(s) Right EYE at bedtime  lisinopril 40 milliGRAM(s) Oral daily  melatonin 3 milliGRAM(s) Oral at bedtime  piperacillin/tazobactam IVPB.. 3.375 Gram(s) IV Intermittent every 8 hours  potassium chloride    Tablet ER 40 milliEquivalent(s) Oral every 4 hours  senna 2 Tablet(s) Oral daily    PRN MEDICATIONS  morphine  - Injectable 2 milliGRAM(s) IV Push every 6 hours PRN  simethicone 80 milliGRAM(s) Chew two times a day PRN      VITAL SIGNS: Last 24 Hours  T(C): 35.9 (23 Dec 2019 05:07), Max: 36.3 (22 Dec 2019 13:33)  T(F): 96.7 (23 Dec 2019 05:07), Max: 97.3 (22 Dec 2019 13:33)  HR: 82 (23 Dec 2019 05:07) (82 - 95)  BP: 134/68 (23 Dec 2019 05:07) (100/57 - 134/68)  BP(mean): --  RR: 18 (23 Dec 2019 05:07) (17 - 18)  SpO2: --    LABS:                        11.9   5.57  )-----------( 173      ( 23 Dec 2019 05:36 )             34.3     12    130<L>  |  95<L>  |  4<L>  ----------------------------<  244<H>  3.1<L>   |  20  |  <0.5<L>    Ca    8.1<L>      23 Dec 2019 05:36    TPro  4.7<L>  /  Alb  2.5<L>  /  TBili  1.0  /  DBili  x   /  AST  19  /  ALT  46<H>  /  AlkPhos  738<H>  12    PTT - ( 23 Dec 2019 05:36 )  PTT:82.4 sec  Urinalysis Basic - ( 22 Dec 2019 05:10 )    Color: Yellow / Appearance: Clear / S.017 / pH: x  Gluc: x / Ketone: Negative  / Bili: Negative / Urobili: <2 mg/dL   Blood: x / Protein: Trace / Nitrite: Negative   Leuk Esterase: Negative / RBC: x / WBC x   Sq Epi: x / Non Sq Epi: x / Bacteria: x        Culture - Body Fluid with Gram Stain (collected 20 Dec 2019 10:00)  Source: Bile None  Gram Stain (20 Dec 2019 22:26):    No polymorphonuclear cells seen per low power field    No organisms seen per oil power field  Preliminary Report (21 Dec 2019 17:46):    No growth      RADIOLOGY:   CT Abd/Pelvis - IMPRESSION: The previously noted mass involving the pancreatic head and body measuring 5.6 x 6.9 cm on the previous PET/CT scan, now measures 10.6 x 8.4 cm. Marked intrahepatic bile duct dilatation and dilatation of the common hepatic duct down to the level of the pancreatic mass.  PHYSICAL EXAM:  CONSTITUTIONAL: No acute distress, well-developed, well-groomed, AAOx3  HEAD: Atraumatic, normocephalic  EYES: EOM intact, conjunctiva and sclera clear  ENT: Supple, no masses, no JVD; moist mucous membranes  PULMONARY: Clear to auscultation bilaterally; no wheezes, rales, or rhonchi  CARDIOVASCULAR: Regular rate and rhythm; no murmurs, rubs, or gallops  GASTROINTESTINAL: Soft, non-tender, mildly distended, bowel sounds present  MUSCULOSKELETAL: 2+ peripheral pulses; no clubbing, no cyanosis, no edema  NEUROLOGY: non-focal  SKIN: No rashes or lesions; warm and dry    ASSESSMENT & PLAN  Patient is a 84y old Female w/ pmhx of pancreatic CA dx last month on chemotherapy (gemcitabine and abraxane), a.fib on eliquis, DM, HTN, DLD, and colon CA s/p resection who presents with a chief complaint of over 1 week duration of worsening, crampy, RUQ, 8/10, constant, RUQ and epigastric abd pain that radiated into her back with associated bilious vomiting, decreased PO intake, and chill, but no fevers. She is currently admitted to medicine with the primary diagnosis of Transaminitis 2/2 to obstruction by pancreatic mass.    Pending/update: F/u IR if pt going for PICC and/or drain internalization today, f/u lab for pending cultures, f/u ID regarding zosyn if cultures updated and reflect as negative, replete potassium    #Transaminitis 2/2 to obstruction  - pancreatic cancer diagnosis last month with elevated LFTs at outpt onc office  - LFTs improved AST- 23, ALT - 56   - Pain improvement following PTC on   - F/u brush biopsy result   -c/w Morphine PRN for pain  - IR cleared pt and rec if medically cleared, pt can be D/C outpt f/u for drain internalization +/- repeat brush biopsy, otherwise will schedule for drain internalization on Thurs or Fri and repeat biopsy if results are equivocal    #) Suspected cholangitis in pt with pancreatic CA vs tumor related biliary obstruction   - SIRS criteria met on admission  - Currently afebrile, WBC - 5.57 today, vitals wnl  - Pending blood cultures, UA  - ID rec Zosyn could change to po Levo 500 mg q24h and po Flagyl 500 mg q12h for 5 more days    #) Hypokalemia  - Potassium 3.1 today, replete     #Atrial fibrillation  -c/w cardizem 120mg TID PO  - Heparin gtt goal 60-90  -Takes eliquis 5 mg BID at home    #Pancreatic cancer  - PICC line placed in left upper arm today  -s/p 1 cycle of chemo on   - Called and left message with Dr. Vela answering service ([phone]) re: outpt chemo txt plan w/ instructions to f/u with Dr. Fleming   -Hem onc following    #Hypertension  -c/w lisinopril, cardizem    #DM   -Monitor FS and start on insulin if FS > 200    #DLD  -c/w atorvastatin    #diet: Clear liquid  #DVT ppx: SCD   #GI ppx: none  #Dispo: D/C PT/ rehab following PICC placement and when cleared by ID, w/ outpt onc f/u ZHAO KUMAR 84y Female  MRN#: [id number]   Hospital Day: 5d    SUBJECTIVE  Patient is a 84y old Female w/ pmhx of pancreatic CA dx last month on chemotherapy (gemcitabine and abraxane), a.fib on eliquis, DM, HTN, DLD, and colon CA s/p resection who presents with a chief complaint of over 1 week duration worsening, crampy, RUQ, 8/10, constant, RUQ and epigastric abd pain that radiated into her back with associated bilious vomiting, decreased PO intake, and chill, but no fevers. The patient went to see her oncologist, Dr. Vela last week and was informed that blood tests showed elevated LFTs. She was sent to the ED and it was discovered that she had AST-500, and ALT- 213, alk phos - 1416, direct bili - 5.4 (total 5.9), and lipase at 6. CT showed no intestinal obstruction, but marked intrahepatic bile duct dilation, and dilation of the common hepatic duct down to the level of the 10.6 x 8.4 cm mass. Pt was also in a.fib with RVR with a HR of 122. She was given 10 mg cardizem IV and 60 mg PO cardizem.     She is currently admitted to medicine with the primary diagnosis of Transaminitis 2/2 to obstruction by pancreatic mass. Today is hospital day 5.     INTERVAL HPI AND OVERNIGHT EVENTS:  Patient was examined and seen at bedside. This morning she is resting comfortably in bed and reports no issues or overnight events. The patient says she's been having a lot of gas pain, but otherwise has no other complaint. External drain is draining some serosangious fluid. The patient had trouble voiding over the weekend and a moreno was placed yesterday. Reports that she has not been ambulating much, but does plan to get out of bed today.       REVIEW OF SYMPTOMS:  CONSTITUTIONAL: No weakness, fevers or chills; No headaches  EYES: No visual changes, eye pain, or discharge  ENT: No vertigo; No ear pain or change in hearing; No sore throat or difficulty swallowing  NECK: No pain or stiffness  RESPIRATORY: No cough, wheezing, or hemoptysis; No shortness of breath  CARDIOVASCULAR: No chest pain or palpitations  GASTROINTESTINAL: +gas pain, No nausea, vomiting, or hematemesis; No diarrhea or constipation; No melena or hematochezia  GENITOURINARY: No dysuria, frequency or hematuria  MUSCULOSKELETAL: No joint pain, no muscle pain, no weakness  NEUROLOGICAL: No numbness or weakness  SKIN: No itching or rashes    OBJECTIVE  PAST MEDICAL & SURGICAL HISTORY  Pancreatic cancer  Anxiety  Hypercholesteremia  Afib  DM (diabetes mellitus)  Acquired cataract  Abnormal serum cholesterol  Generalized OA  Mildly obese  H/O: HTN (hypertension)  History of tonsillectomy  History of cataract surgery  History of colon resection  History of cholecystectomy  H/O hernia repair: abdomen    ALLERGIES:  No Known Allergies    MEDICATIONS:  STANDING MEDICATIONS  atorvastatin 20 milliGRAM(s) Oral at bedtime  chlorhexidine 4% Liquid 1 Application(s) Topical <User Schedule>  dextrose 5% + sodium chloride 0.45%. 1000 milliLiter(s) IV Continuous <Continuous>  diltiazem    Tablet 120 milliGRAM(s) Oral three times a day  dorzolamide 2% Ophthalmic Solution 1 Drop(s) Left EYE three times a day  FLUoxetine 20 milliGRAM(s) Oral daily  heparin  Infusion 1100 Unit(s)/Hr IV Continuous <Continuous>  influenza   Vaccine 0.5 milliLiter(s) IntraMuscular once  latanoprost 0.005% Ophthalmic Solution 1 Drop(s) Right EYE at bedtime  lisinopril 40 milliGRAM(s) Oral daily  melatonin 3 milliGRAM(s) Oral at bedtime  piperacillin/tazobactam IVPB.. 3.375 Gram(s) IV Intermittent every 8 hours  potassium chloride    Tablet ER 40 milliEquivalent(s) Oral every 4 hours  senna 2 Tablet(s) Oral daily    PRN MEDICATIONS  morphine  - Injectable 2 milliGRAM(s) IV Push every 6 hours PRN  simethicone 80 milliGRAM(s) Chew two times a day PRN      VITAL SIGNS: Last 24 Hours  T(C): 35.9 (23 Dec 2019 05:07), Max: 36.3 (22 Dec 2019 13:33)  T(F): 96.7 (23 Dec 2019 05:07), Max: 97.3 (22 Dec 2019 13:33)  HR: 82 (23 Dec 2019 05:07) (82 - 95)  BP: 134/68 (23 Dec 2019 05:07) (100/57 - 134/68)  BP(mean): --  RR: 18 (23 Dec 2019 05:07) (17 - 18)  SpO2: --    LABS:                        11.9   5.57  )-----------( 173      ( 23 Dec 2019 05:36 )             34.3     12    130<L>  |  95<L>  |  4<L>  ----------------------------<  244<H>  3.1<L>   |  20  |  <0.5<L>    Ca    8.1<L>      23 Dec 2019 05:36    TPro  4.7<L>  /  Alb  2.5<L>  /  TBili  1.0  /  DBili  x   /  AST  19  /  ALT  46<H>  /  AlkPhos  738<H>  12    PTT - ( 23 Dec 2019 05:36 )  PTT:82.4 sec  Urinalysis Basic - ( 22 Dec 2019 05:10 )    Color: Yellow / Appearance: Clear / S.017 / pH: x  Gluc: x / Ketone: Negative  / Bili: Negative / Urobili: <2 mg/dL   Blood: x / Protein: Trace / Nitrite: Negative   Leuk Esterase: Negative / RBC: x / WBC x   Sq Epi: x / Non Sq Epi: x / Bacteria: x        Culture - Body Fluid with Gram Stain (collected 20 Dec 2019 10:00)  Source: Bile None  Gram Stain (20 Dec 2019 22:26):    No polymorphonuclear cells seen per low power field    No organisms seen per oil power field  Preliminary Report (21 Dec 2019 17:46):    No growth      RADIOLOGY:   CT Abd/Pelvis - IMPRESSION: The previously noted mass involving the pancreatic head and body measuring 5.6 x 6.9 cm on the previous PET/CT scan, now measures 10.6 x 8.4 cm. Marked intrahepatic bile duct dilatation and dilatation of the common hepatic duct down to the level of the pancreatic mass.  PHYSICAL EXAM:  CONSTITUTIONAL: No acute distress, well-developed, well-groomed, AAOx3  HEAD: Atraumatic, normocephalic  EYES: EOM intact, conjunctiva and sclera clear  ENT: Supple, no masses, no JVD; moist mucous membranes  PULMONARY: Clear to auscultation bilaterally; no wheezes, rales, or rhonchi  CARDIOVASCULAR: Regular rate and rhythm; no murmurs, rubs, or gallops  GASTROINTESTINAL: Soft, non-tender, mildly distended, bowel sounds present  MUSCULOSKELETAL: 2+ peripheral pulses; no clubbing, no cyanosis, no edema  NEUROLOGY: non-focal  SKIN: No rashes or lesions; warm and dry    ASSESSMENT & PLAN  Patient is a 84y old Female w/ pmhx of pancreatic CA dx last month on chemotherapy (gemcitabine and abraxane), a.fib on eliquis, DM, HTN, DLD, and colon CA s/p resection who presents with a chief complaint of over 1 week duration of worsening, crampy, RUQ, 8/10, constant, RUQ and epigastric abd pain that radiated into her back with associated bilious vomiting, decreased PO intake, and chill, but no fevers. She is currently admitted to medicine with the primary diagnosis of Transaminitis 2/2 to obstruction by pancreatic mass.    Pending/update: s/p picc, waiting for type of chemo and if there is bone marrow suppression as a side effect, need all these for nursing home, pending PT     #Transaminitis 2/2 to obstruction  - pancreatic cancer diagnosis last month with elevated LFTs at outpt onc office  - LFTs improved AST- 23, ALT - 56   - Pain improvement following PTC on   - F/u brush biopsy result   -c/w Morphine PRN for pain  - IR cleared pt and rec if medically cleared, pt can be D/C outpt f/u for drain internalization +/- repeat brush biopsy, otherwise will schedule for drain internalization on Thurs or Fri and repeat biopsy if results are equivocal  -s/p Picc line for chemo    #) Suspected cholangitis in pt with pancreatic CA vs tumor related biliary obstruction   - SIRS criteria met on admission  - Currently afebrile, WBC - 5.57 today, vitals wnl  - Pending blood cultures, UA  - Discontinued Abx, as blood culture is negative and there is no cholangitis    #) Hypokalemia  - Potassium 3.1 today, repleted    #Atrial fibrillation  -c/w cardizem 120mg TID PO  - Heparin gtt goal 60-90  -Takes eliquis 5 mg BID at home, today, we will switch heparin drip to Eliquis at 7pm and d/c heparin gtt    #Pancreatic cancer  - PICC line placed in left upper arm today  -s/p 1 cycle of chemo on   - Called and left message with Dr. Vela answering service ([phone]) re: out pt chemo txt plan w/ instructions to f/u  -currently scheduled to receive chemo on [address] 3 at [address] location  -Hem onc following    #Hypertension  -c/w lisinopril, cardizem    #DM   -Monitor FS and start on insulin if FS > 200    #DLD  -c/w atorvastatin    #diet: Clear liquid  #DVT ppx: SCD   #GI ppx: none  #Dispo: D/C PT/ rehab following PICC placement and when cleared by ID, w/ outpt onc f/u ZHAO KUMAR 84y Female  MRN#: [id number]   Hospital Day: 5d    SUBJECTIVE  Patient is a 84y old Female w/ pmhx of pancreatic CA dx last month on chemotherapy (gemcitabine and abraxane), a.fib on eliquis, DM, HTN, DLD, and colon CA s/p resection who presents with a chief complaint of over 1 week duration worsening, crampy, RUQ, 8/10, constant, RUQ and epigastric abd pain that radiated into her back with associated bilious vomiting, decreased PO intake, and chill, but no fevers. The patient went to see her oncologist, Dr. Vela last week and was informed that blood tests showed elevated LFTs. She was sent to the ED and it was discovered that she had AST-500, and ALT- 213, alk phos - 1416, direct bili - 5.4 (total 5.9), and lipase at 6. CT showed no intestinal obstruction, but marked intrahepatic bile duct dilation, and dilation of the common hepatic duct down to the level of the 10.6 x 8.4 cm mass. Pt was also in a.fib with RVR with a HR of 122. She was given 10 mg cardizem IV and 60 mg PO cardizem.     She is currently admitted to medicine with the primary diagnosis of Transaminitis 2/2 to obstruction by pancreatic mass. Today is hospital day 5.     INTERVAL HPI AND OVERNIGHT EVENTS:  Patient was examined and seen at bedside. This morning she is resting comfortably in bed and reports no issues or overnight events. The patient says she's been having a lot of gas pain, but otherwise has no other complaint. External drain is draining some serosangious fluid. The patient had trouble voiding over the weekend and a moreno was placed yesterday. Reports that she has not been ambulating much, but does plan to get out of bed today.       REVIEW OF SYMPTOMS:  CONSTITUTIONAL: No weakness, fevers or chills; No headaches  EYES: No visual changes, eye pain, or discharge  ENT: No vertigo; No ear pain or change in hearing; No sore throat or difficulty swallowing  NECK: No pain or stiffness  RESPIRATORY: No cough, wheezing, or hemoptysis; No shortness of breath  CARDIOVASCULAR: No chest pain or palpitations  GASTROINTESTINAL: +gas pain, No nausea, vomiting, or hematemesis; No diarrhea or constipation; No melena or hematochezia  GENITOURINARY: No dysuria, frequency or hematuria  MUSCULOSKELETAL: No joint pain, no muscle pain, no weakness  NEUROLOGICAL: No numbness or weakness  SKIN: No itching or rashes    OBJECTIVE  PAST MEDICAL & SURGICAL HISTORY  Pancreatic cancer  Anxiety  Hypercholesteremia  Afib  DM (diabetes mellitus)  Acquired cataract  Abnormal serum cholesterol  Generalized OA  Mildly obese  H/O: HTN (hypertension)  History of tonsillectomy  History of cataract surgery  History of colon resection  History of cholecystectomy  H/O hernia repair: abdomen    ALLERGIES:  No Known Allergies    MEDICATIONS:  STANDING MEDICATIONS  atorvastatin 20 milliGRAM(s) Oral at bedtime  chlorhexidine 4% Liquid 1 Application(s) Topical <User Schedule>  dextrose 5% + sodium chloride 0.45%. 1000 milliLiter(s) IV Continuous <Continuous>  diltiazem    Tablet 120 milliGRAM(s) Oral three times a day  dorzolamide 2% Ophthalmic Solution 1 Drop(s) Left EYE three times a day  FLUoxetine 20 milliGRAM(s) Oral daily  heparin  Infusion 1100 Unit(s)/Hr IV Continuous <Continuous>  influenza   Vaccine 0.5 milliLiter(s) IntraMuscular once  latanoprost 0.005% Ophthalmic Solution 1 Drop(s) Right EYE at bedtime  lisinopril 40 milliGRAM(s) Oral daily  melatonin 3 milliGRAM(s) Oral at bedtime  piperacillin/tazobactam IVPB.. 3.375 Gram(s) IV Intermittent every 8 hours  potassium chloride    Tablet ER 40 milliEquivalent(s) Oral every 4 hours  senna 2 Tablet(s) Oral daily    PRN MEDICATIONS  morphine  - Injectable 2 milliGRAM(s) IV Push every 6 hours PRN  simethicone 80 milliGRAM(s) Chew two times a day PRN      VITAL SIGNS: Last 24 Hours  T(C): 35.9 (23 Dec 2019 05:07), Max: 36.3 (22 Dec 2019 13:33)  T(F): 96.7 (23 Dec 2019 05:07), Max: 97.3 (22 Dec 2019 13:33)  HR: 82 (23 Dec 2019 05:07) (82 - 95)  BP: 134/68 (23 Dec 2019 05:07) (100/57 - 134/68)  BP(mean): --  RR: 18 (23 Dec 2019 05:07) (17 - 18)  SpO2: --    LABS:                        11.9   5.57  )-----------( 173      ( 23 Dec 2019 05:36 )             34.3     12    130<L>  |  95<L>  |  4<L>  ----------------------------<  244<H>  3.1<L>   |  20  |  <0.5<L>    Ca    8.1<L>      23 Dec 2019 05:36    TPro  4.7<L>  /  Alb  2.5<L>  /  TBili  1.0  /  DBili  x   /  AST  19  /  ALT  46<H>  /  AlkPhos  738<H>  12    PTT - ( 23 Dec 2019 05:36 )  PTT:82.4 sec  Urinalysis Basic - ( 22 Dec 2019 05:10 )    Color: Yellow / Appearance: Clear / S.017 / pH: x  Gluc: x / Ketone: Negative  / Bili: Negative / Urobili: <2 mg/dL   Blood: x / Protein: Trace / Nitrite: Negative   Leuk Esterase: Negative / RBC: x / WBC x   Sq Epi: x / Non Sq Epi: x / Bacteria: x        Culture - Body Fluid with Gram Stain (collected 20 Dec 2019 10:00)  Source: Bile None  Gram Stain (20 Dec 2019 22:26):    No polymorphonuclear cells seen per low power field    No organisms seen per oil power field  Preliminary Report (21 Dec 2019 17:46):    No growth      RADIOLOGY:   CT Abd/Pelvis - IMPRESSION: The previously noted mass involving the pancreatic head and body measuring 5.6 x 6.9 cm on the previous PET/CT scan, now measures 10.6 x 8.4 cm. Marked intrahepatic bile duct dilatation and dilatation of the common hepatic duct down to the level of the pancreatic mass.  PHYSICAL EXAM:  CONSTITUTIONAL: No acute distress, well-developed, well-groomed, AAOx3  HEAD: Atraumatic, normocephalic  EYES: EOM intact, conjunctiva and sclera clear  ENT: Supple, no masses, no JVD; moist mucous membranes  PULMONARY: Clear to auscultation bilaterally; no wheezes, rales, or rhonchi  CARDIOVASCULAR: Regular rate and rhythm; no murmurs, rubs, or gallops  GASTROINTESTINAL: Soft, non-tender, mildly distended, bowel sounds present  MUSCULOSKELETAL: 2+ peripheral pulses; no clubbing, no cyanosis, no edema  NEUROLOGY: non-focal  SKIN: No rashes or lesions; warm and dry    ASSESSMENT & PLAN  Patient is a 84y old Female w/ pmhx of pancreatic CA dx last month on chemotherapy (gemcitabine and abraxane), a.fib on eliquis, DM, HTN, DLD, and colon CA s/p resection who presents with a chief complaint of over 1 week duration of worsening, crampy, RUQ, 8/10, constant, RUQ and epigastric abd pain that radiated into her back with associated bilious vomiting, decreased PO intake, and chill, but no fevers. She is currently admitted to medicine with the primary diagnosis of Transaminitis 2/2 to obstruction by pancreatic mass.    Pending/update: s/p picc, waiting for type of chemo and if there is bone marrow suppression as a side effect, need all these for nursing home, pending PT     #Transaminitis 2/2 to obstruction  - pancreatic cancer diagnosis last month with elevated LFTs at outpt onc office  - LFTs improved AST- 23, ALT - 56   - Pain improvement following PTC on   - F/u brush biopsy result   -c/w Morphine PRN for pain  - IR cleared pt and rec if medically cleared, pt can be D/C outpt f/u for drain internalization +/- repeat brush biopsy, otherwise will schedule for drain internalization on Thurs or Fri and repeat biopsy if results are equivocal  -s/p Picc line for chemo    #) Suspected cholangitis in pt with pancreatic CA vs tumor related biliary obstruction   - SIRS criteria met on admission  - Currently afebrile, WBC - 5.57 today, vitals wnl  - Pending blood cultures, UA  - Discontinued Abx, as blood culture is negative and there is no cholangitis, Resident spoke to Dr. Chong, as there is no need to continue with antibiotics    #) Hypokalemia  - Potassium 3.1 today, repleted    #Atrial fibrillation  -c/w cardizem 120mg TID PO  - Heparin gtt goal 60-90  -Takes eliquis 5 mg BID at home, today, we will switch heparin drip to Eliquis at 7pm and d/c heparin gtt    #Pancreatic cancer  - PICC line placed in left upper arm today  -s/p 1 cycle of chemo on   - Called and left message with Dr. Vela answering service ([phone]), re: out pt chemo txt plan w/ instructions to f/u  -currently scheduled to receive chemo on Rashid 3 at [facility] location  -Hem onc following    #Hypertension  -c/w lisinopril, cardizem    #DM   -Monitor FS and start on insulin if FS > 200    #DLD  -c/w atorvastatin    #diet: Clear liquid  #DVT ppx: SCD   #GI ppx: none  #Dispo: D/C PT/ rehab following PICC placement and when cleared by ID, w/ outpt onc f/u

## 2019-12-23 NOTE — PROGRESS NOTE ADULT - ASSESSMENT
Admitted with TRANSAMINITIS;INTRACTABLE ABDOMINAL PAIN;ATRIAL FIBRILLATION WITH RVR    HPI:  Patient is an 85 y/o F with PMH of Pancreatic cancer diagnosed last month on chemotherapy gemcitabine and abraxane s/p 1st dose on 12/12/19, A fib on eliquis, DM, hypertension, DLD, colon ca s/p resection presented to the hospital with abdominal pain for 1 week. Patient has been having crampy epigastric pain radiating to the back, 8/10 in intensity, constant, progressively worsening for 1 week. Also had multiple episodes of bilious vomiting, associated with decreased PO intake. C/o chills but no subjective fevers at home. Patient noticed 40lbs weight loss in the last 11 months. She was being worked up for pancreatic mass since May 2019 with 2-3 negative biopsy results but recent biopsy came back positive for pancreatic cancer. She was started on chemo, first dose received last Thursday by Dr. Vela. She went to see Dr. Vela in her office today as the pain was worsening, she had blood work that showed elevated LFTs so she was sent to the hospital. Patient has family history of pancreatic cancer, in niece. Patient was a smoker for 30yrs, used to smoke 1/2pc/day but stopped 20yrs ago.  In ED, /92mm Hg, /min, 97.9F. LFTs showed , , Alkaline phosphatase 1416, total bilirubin 5.9, lipase 6. CT abdomen w and w/o contrast showed no evidence of intestinal obstruction, marked intrahepatic bile duct dilatation and dilatation of the common hepatic duct down to the level of the pancreatic mass. Patient was in A fib with RVR, s/p 10mg cardizem IV and 60mg PO cardizem.    PROBLEMS  1. SIRS (T<96.8F,  Pulse>90, wbc<4)  2.  Suspected cholangitis in pt with pancreatic CA vs tumor related biliary obstruction  AP 1416,  to 1123/152 (improving)  CT: The previously noted mass involving the pancreatic head and body measuring 5.6 x 6.9 cm on the previous PET/CT scan, now measures 10.6 x 8.4 cm. Marked intrahepatic bile duct dilatation and dilatation of the common hepatic duct down to the level of the pancreatic mass.  BCx 12/19, 20 NG  Bile fluid 12/20 no PMNs, no org, NG cultures      PLAN  Zosyn  Could change to po Levo 500 mg q24h and po Flagyl 500 mg q12h for 5 more days

## 2019-12-23 NOTE — PROGRESS NOTE ADULT - ASSESSMENT
pt will be leaving for SNF tomorrow   discussed with resident    carmen follow as outpt in office then plan for next chemo   resident said he discussed with ID ATTENDING  and no further antibiotics needed   will follow labs     mikey simon MD  [phone]

## 2019-12-23 NOTE — PROGRESS NOTE ADULT - SUBJECTIVE AND OBJECTIVE BOX
STACY VIRGINIA  84y, Female    All available historical data reviewed    OVERNIGHT EVENTS:  no fevers, no abdominal pain    ROS:  General: Denies rigors, night sweats  HEENT: Denies headache, rhinorrhea, sore throat, eye pain  CV: Denies CP, palpitations  PULM: Denies wheezing, hemoptysis  GI: Denies hematemesis, hematochezia, melena  : Denies discharge, hematuria  MSK: Denies arthralgias, myalgias  SKIN: Denies rash, lesions  NEURO: Denies paresthesias, weakness  PSYCH: Denies depression, anxiety    VITALS:  T(F): 96.7, Max: 97.3 (- @ 13:33)  HR: 82  BP: 134/68  RR: 18Vital Signs Last 24 Hrs  T(C): 35.9 (23 Dec 2019 05:07), Max: 36.3 (22 Dec 2019 13:33)  T(F): 96.7 (23 Dec 2019 05:07), Max: 97.3 (22 Dec 2019 13:33)  HR: 82 (23 Dec 2019 05:07) (82 - 95)  BP: 134/68 (23 Dec 2019 05:07) (100/57 - 134/68)  BP(mean): --  RR: 18 (23 Dec 2019 05:07) (17 - 18)  SpO2: --    TESTS & MEASUREMENTS:                        11.9   5.57  )-----------( 173      ( 23 Dec 2019 05:36 )             34.3         130<L>  |  95<L>  |  4<L>  ----------------------------<  244<H>  3.1<L>   |  20  |  <0.5<L>    Ca    8.1<L>      23 Dec 2019 05:36    TPro  4.7<L>  /  Alb  2.5<L>  /  TBili  1.0  /  DBili  x   /  AST  19  /  ALT  46<H>  /  AlkPhos  738<H>      LIVER FUNCTIONS - ( 23 Dec 2019 05:36 )  Alb: 2.5 g/dL / Pro: 4.7 g/dL / ALK PHOS: 738 U/L / ALT: 46 U/L / AST: 19 U/L / GGT: x             Culture - Body Fluid with Gram Stain (collected 19 @ 10:00)  Source: Bile None  Gram Stain (19 @ 22:26):    No polymorphonuclear cells seen per low power field    No organisms seen per oil power field  Preliminary Report (19 @ 17:46):    No growth    Culture - Blood (collected 19 @ 04:30)  Source: .Blood Blood  Preliminary Report (19 @ 14:01):    No growth to date.    Culture - Blood (collected 19 @ 05:48)  Source: .Blood None  Preliminary Report (19 @ 18:01):    No growth to date.      Urinalysis Basic - ( 22 Dec 2019 05:10 )    Color: Yellow / Appearance: Clear / S.017 / pH: x  Gluc: x / Ketone: Negative  / Bili: Negative / Urobili: <2 mg/dL   Blood: x / Protein: Trace / Nitrite: Negative   Leuk Esterase: Negative / RBC: x / WBC x   Sq Epi: x / Non Sq Epi: x / Bacteria: x          RADIOLOGY & ADDITIONAL TESTS:  Personal review of radiological diagnostics performed  Echo and EKG results noted when applicable.     ANTIBIOTICS:  piperacillin/tazobactam IVPB.. 3.375 Gram(s) IV Intermittent every 8 hours

## 2019-12-23 NOTE — PROGRESS NOTE ADULT - SUBJECTIVE AND OBJECTIVE BOX
ADVANCED STAGE PANCREATIC CANCER   s/p external billiary drainage   LFTS  improving   ALSO A PICC LINE DONE FOR FUTURE TREATMENT   feels better

## 2019-12-24 ENCOUNTER — TRANSCRIPTION ENCOUNTER (OUTPATIENT)
Age: 84
End: 2019-12-24

## 2019-12-24 LAB
ALBUMIN SERPL ELPH-MCNC: 2.8 G/DL — LOW (ref 3.5–5.2)
ALP SERPL-CCNC: 721 U/L — HIGH (ref 30–115)
ALT FLD-CCNC: 39 U/L — SIGNIFICANT CHANGE UP (ref 0–41)
ANION GAP SERPL CALC-SCNC: 12 MMOL/L — SIGNIFICANT CHANGE UP (ref 7–14)
AST SERPL-CCNC: 17 U/L — SIGNIFICANT CHANGE UP (ref 0–41)
BILIRUB SERPL-MCNC: 1.3 MG/DL — HIGH (ref 0.2–1.2)
BUN SERPL-MCNC: 4 MG/DL — LOW (ref 10–20)
CALCIUM SERPL-MCNC: 8.5 MG/DL — SIGNIFICANT CHANGE UP (ref 8.5–10.1)
CHLORIDE SERPL-SCNC: 95 MMOL/L — LOW (ref 98–110)
CO2 SERPL-SCNC: 21 MMOL/L — SIGNIFICANT CHANGE UP (ref 17–32)
CREAT SERPL-MCNC: <0.5 MG/DL — LOW (ref 0.7–1.5)
CULTURE RESULTS: NO GROWTH — SIGNIFICANT CHANGE UP
CULTURE RESULTS: SIGNIFICANT CHANGE UP
GLUCOSE BLDC GLUCOMTR-MCNC: 142 MG/DL — HIGH (ref 70–99)
GLUCOSE BLDC GLUCOMTR-MCNC: 144 MG/DL — HIGH (ref 70–99)
GLUCOSE BLDC GLUCOMTR-MCNC: 162 MG/DL — HIGH (ref 70–99)
GLUCOSE SERPL-MCNC: 171 MG/DL — HIGH (ref 70–99)
HCT VFR BLD CALC: 34.2 % — LOW (ref 37–47)
HGB BLD-MCNC: 12.3 G/DL — SIGNIFICANT CHANGE UP (ref 12–16)
MAGNESIUM SERPL-MCNC: 1.8 MG/DL — SIGNIFICANT CHANGE UP (ref 1.8–2.4)
MCHC RBC-ENTMCNC: 32.8 PG — HIGH (ref 27–31)
MCHC RBC-ENTMCNC: 36 G/DL — SIGNIFICANT CHANGE UP (ref 32–37)
MCV RBC AUTO: 91.2 FL — SIGNIFICANT CHANGE UP (ref 81–99)
NON-GYNECOLOGICAL CYTOLOGY STUDY: SIGNIFICANT CHANGE UP
NRBC # BLD: 0 /100 WBCS — SIGNIFICANT CHANGE UP (ref 0–0)
PLATELET # BLD AUTO: 174 K/UL — SIGNIFICANT CHANGE UP (ref 130–400)
POTASSIUM SERPL-MCNC: 4.6 MMOL/L — SIGNIFICANT CHANGE UP (ref 3.5–5)
POTASSIUM SERPL-SCNC: 4.6 MMOL/L — SIGNIFICANT CHANGE UP (ref 3.5–5)
PROT SERPL-MCNC: 5 G/DL — LOW (ref 6–8)
RBC # BLD: 3.75 M/UL — LOW (ref 4.2–5.4)
RBC # FLD: 15.2 % — HIGH (ref 11.5–14.5)
SODIUM SERPL-SCNC: 128 MMOL/L — LOW (ref 135–146)
SPECIMEN SOURCE: SIGNIFICANT CHANGE UP
SPECIMEN SOURCE: SIGNIFICANT CHANGE UP
WBC # BLD: 6.09 K/UL — SIGNIFICANT CHANGE UP (ref 4.8–10.8)
WBC # FLD AUTO: 6.09 K/UL — SIGNIFICANT CHANGE UP (ref 4.8–10.8)

## 2019-12-24 PROCEDURE — 99233 SBSQ HOSP IP/OBS HIGH 50: CPT

## 2019-12-24 RX ADMIN — DORZOLAMIDE HYDROCHLORIDE 1 DROP(S): 20 SOLUTION/ DROPS OPHTHALMIC at 05:58

## 2019-12-24 RX ADMIN — Medication 120 MILLIGRAM(S): at 14:57

## 2019-12-24 RX ADMIN — LATANOPROST 1 DROP(S): 0.05 SOLUTION/ DROPS OPHTHALMIC; TOPICAL at 22:08

## 2019-12-24 RX ADMIN — ATORVASTATIN CALCIUM 20 MILLIGRAM(S): 80 TABLET, FILM COATED ORAL at 22:09

## 2019-12-24 RX ADMIN — APIXABAN 5 MILLIGRAM(S): 2.5 TABLET, FILM COATED ORAL at 19:12

## 2019-12-24 RX ADMIN — DORZOLAMIDE HYDROCHLORIDE 1 DROP(S): 20 SOLUTION/ DROPS OPHTHALMIC at 14:57

## 2019-12-24 RX ADMIN — SIMETHICONE 80 MILLIGRAM(S): 80 TABLET, CHEWABLE ORAL at 22:18

## 2019-12-24 RX ADMIN — SENNA PLUS 2 TABLET(S): 8.6 TABLET ORAL at 12:28

## 2019-12-24 RX ADMIN — DORZOLAMIDE HYDROCHLORIDE 1 DROP(S): 20 SOLUTION/ DROPS OPHTHALMIC at 22:07

## 2019-12-24 RX ADMIN — Medication 120 MILLIGRAM(S): at 22:09

## 2019-12-24 RX ADMIN — LISINOPRIL 40 MILLIGRAM(S): 2.5 TABLET ORAL at 05:57

## 2019-12-24 RX ADMIN — MORPHINE SULFATE 2 MILLIGRAM(S): 50 CAPSULE, EXTENDED RELEASE ORAL at 23:36

## 2019-12-24 RX ADMIN — MORPHINE SULFATE 2 MILLIGRAM(S): 50 CAPSULE, EXTENDED RELEASE ORAL at 06:01

## 2019-12-24 RX ADMIN — Medication 120 MILLIGRAM(S): at 05:57

## 2019-12-24 RX ADMIN — Medication 3 MILLIGRAM(S): at 22:10

## 2019-12-24 RX ADMIN — MORPHINE SULFATE 2 MILLIGRAM(S): 50 CAPSULE, EXTENDED RELEASE ORAL at 07:30

## 2019-12-24 RX ADMIN — CHLORHEXIDINE GLUCONATE 1 APPLICATION(S): 213 SOLUTION TOPICAL at 05:58

## 2019-12-24 RX ADMIN — MORPHINE SULFATE 2 MILLIGRAM(S): 50 CAPSULE, EXTENDED RELEASE ORAL at 15:25

## 2019-12-24 RX ADMIN — MORPHINE SULFATE 2 MILLIGRAM(S): 50 CAPSULE, EXTENDED RELEASE ORAL at 15:04

## 2019-12-24 RX ADMIN — Medication 20 MILLIGRAM(S): at 12:27

## 2019-12-24 RX ADMIN — APIXABAN 5 MILLIGRAM(S): 2.5 TABLET, FILM COATED ORAL at 05:58

## 2019-12-24 NOTE — DISCHARGE NOTE PROVIDER - NSDCFUADDINST_GEN_ALL_CORE_FT
please follow up with your primary care doctor within one week  please follow up with Interventional radiologist please follow up with your primary care doctor within one week  please follow up with Interventional radiologist  Please follow up with your primary care doctor for urinary retention and if you need moreno. Please get re-evaluation for need of moreno by your primary care doctor

## 2019-12-24 NOTE — PHYSICAL THERAPY INITIAL EVALUATION ADULT - GENERAL OBSERVATIONS, REHAB EVAL
120-150 pm Chart reviewed. Pt. seen semirecline in bed, in No apparent distress, + IV lock, biliary drain, Pt. agreed to activity/therapy.

## 2019-12-24 NOTE — PROGRESS NOTE ADULT - SUBJECTIVE AND OBJECTIVE BOX
STACY VIRGINIA  84y, Female    All available historical data reviewed    OVERNIGHT EVENTS:  no abdominal pain, has diarrhea    ROS:  General: Denies rigors, night sweats  HEENT: Denies headache, rhinorrhea, sore throat, eye pain  CV: Denies CP, palpitations  PULM: Denies wheezing, hemoptysis  GI: Denies hematemesis, hematochezia, melena  : Denies discharge, hematuria  MSK: Denies arthralgias, myalgias  SKIN: Denies rash, lesions  NEURO: Denies paresthesias, weakness  PSYCH: Denies depression, anxiety    VITALS:  T(F): 97.6, Max: 97.6 (12-24-19 @ 05:46)  HR: 88  BP: 122/60  RR: 18Vital Signs Last 24 Hrs  T(C): 36.4 (24 Dec 2019 05:46), Max: 36.4 (24 Dec 2019 05:46)  T(F): 97.6 (24 Dec 2019 05:46), Max: 97.6 (24 Dec 2019 05:46)  HR: 88 (24 Dec 2019 05:46) (84 - 101)  BP: 122/60 (24 Dec 2019 05:46) (95/55 - 122/60)  BP(mean): --  RR: 18 (24 Dec 2019 05:46) (18 - 18)  SpO2: --    TESTS & MEASUREMENTS:                        12.3   6.09  )-----------( 174      ( 24 Dec 2019 06:23 )             34.2     12-24    128<L>  |  95<L>  |  4<L>  ----------------------------<  171<H>  4.6   |  21  |  <0.5<L>    Ca    8.5      24 Dec 2019 06:23  Mg     1.8     12-24    TPro  5.0<L>  /  Alb  2.8<L>  /  TBili  1.3<H>  /  DBili  x   /  AST  17  /  ALT  39  /  AlkPhos  721<H>  12-24    LIVER FUNCTIONS - ( 24 Dec 2019 06:23 )  Alb: 2.8 g/dL / Pro: 5.0 g/dL / ALK PHOS: 721 U/L / ALT: 39 U/L / AST: 17 U/L / GGT: x             Culture - Urine (collected 12-22-19 @ 23:04)  Source: .Urine Catheterized  Final Report (12-24-19 @ 07:00):    No growth    Culture - Acid Fast (collected 12-20-19 @ 10:00)    Culture - Fungal, Body Fluid (collected 12-20-19 @ 10:00)  Source: Bile None  Preliminary Report (12-23-19 @ 08:58):    Testing in progress    Culture - Body Fluid with Gram Stain (collected 12-20-19 @ 10:00)  Source: Bile None  Gram Stain (12-20-19 @ 22:26):    No polymorphonuclear cells seen per low power field    No organisms seen per oil power field  Preliminary Report (12-21-19 @ 17:46):    No growth    Culture - Blood (collected 12-20-19 @ 04:30)  Source: .Blood Blood  Preliminary Report (12-21-19 @ 14:01):    No growth to date.    Culture - Blood (collected 12-19-19 @ 05:48)  Source: .Blood None  Preliminary Report (12-20-19 @ 18:01):    No growth to date.            RADIOLOGY & ADDITIONAL TESTS:  Personal review of radiological diagnostics performed  Echo and EKG results noted when applicable.     ANTIBIOTICS:

## 2019-12-24 NOTE — PROGRESS NOTE ADULT - SUBJECTIVE AND OBJECTIVE BOX
S: No n ewevents/complaints  couldn't walk with PT      All other pertinent ROS negative.      12-23-19 @ 07:01  -  12-24-19 @ 07:00  --------------------------------------------------------  IN: 88 mL / OUT: 900 mL / NET: -812 mL      Vital Signs Last 24 Hrs  T(C): 35.9 (24 Dec 2019 13:30), Max: 36.4 (24 Dec 2019 05:46)  T(F): 96.7 (24 Dec 2019 13:30), Max: 97.6 (24 Dec 2019 05:46)  HR: 88 (24 Dec 2019 13:30) (85 - 101)  BP: 144/57 (24 Dec 2019 13:30) (95/55 - 144/57)  BP(mean): --  RR: 18 (24 Dec 2019 13:30) (18 - 18)  SpO2: --  PHYSICAL EXAM:    no change from prior. drain seen.    MEDICATIONS:  MEDICATIONS  (STANDING):  apixaban 5 milliGRAM(s) Oral two times a day  atorvastatin 20 milliGRAM(s) Oral at bedtime  chlorhexidine 4% Liquid 1 Application(s) Topical <User Schedule>  diltiazem    Tablet 120 milliGRAM(s) Oral three times a day  dorzolamide 2% Ophthalmic Solution 1 Drop(s) Left EYE three times a day  FLUoxetine 20 milliGRAM(s) Oral daily  influenza   Vaccine 0.5 milliLiter(s) IntraMuscular once  latanoprost 0.005% Ophthalmic Solution 1 Drop(s) Right EYE at bedtime  lisinopril 40 milliGRAM(s) Oral daily  melatonin 3 milliGRAM(s) Oral at bedtime  senna 2 Tablet(s) Oral daily      LABS: All Labs Reviewed:                        12.3   6.09  )-----------( 174      ( 24 Dec 2019 06:23 )             34.2     12-24    128<L>  |  95<L>  |  4<L>  ----------------------------<  171<H>  4.6   |  21  |  <0.5<L>    Ca    8.5      24 Dec 2019 06:23  Mg     1.8     12-24    TPro  5.0<L>  /  Alb  2.8<L>  /  TBili  1.3<H>  /  DBili  x   /  AST  17  /  ALT  39  /  AlkPhos  721<H>  12-24    PTT - ( 23 Dec 2019 05:36 )  PTT:82.4 sec      Blood Culture: 12-22 @ 23:04  Organism --  Gram Stain Blood -- Gram Stain --  Specimen Source .Urine Catheterized  Culture-Blood --    12-20 @ 10:00  Organism --  Gram Stain Blood -- Gram Stain   No polymorphonuclear cells seen per low power field  No organisms seen per oil power field  Specimen Source Bile None  Culture-Blood --    12-20 @ 04:30  Organism --  Gram Stain Blood -- Gram Stain --  Specimen Source .Blood Blood  Culture-Blood --        Radiology: reviewed

## 2019-12-24 NOTE — PROGRESS NOTE ADULT - ASSESSMENT
suggest pl do ct abdomen again to check for blockage as throws up when tries to eat   need physical therapy   moniter labs   will follow

## 2019-12-24 NOTE — DISCHARGE NOTE PROVIDER - NSDCMRMEDTOKEN_GEN_ALL_CORE_FT
atorvastatin 20 mg oral tablet: 1 tab(s) orally once a day  dilTIAZem 120 mg oral tablet: 1 tab(s) orally 3 times a day  dorzolamide 2% ophthalmic solution: 1 drop(s) to each affected eye 3 times a day  Eliquis 5 mg oral tablet: 1 tab(s) orally 2 times a day  FLUoxetine 20 mg oral capsule: 1 cap(s) orally once a day  fosinopril 40 mg oral tablet: 1 tab(s) orally once a day  latanoprost 0.005% ophthalmic solution: 1 drop(s) to each affected eye once a day (in the evening)  metFORMIN 500 mg oral tablet: 1 tab(s) orally 2 times a day  repaglinide 0.5 mg oral tablet: 1 tab(s) orally 2 times a day (before meals)

## 2019-12-24 NOTE — DISCHARGE NOTE PROVIDER - HOSPITAL COURSE
Patient is a 84y old Female w/ pmhx of pancreatic CA dx last month on chemotherapy (gemcitabine and abraxane), a.fib on eliquis, DM, HTN, DLD, and colon CA s/p resection who presents with a chief complaint of over 1 week duration of worsening, crampy, RUQ, 8/10, constant, RUQ and epigastric abd pain that radiated into her back with associated bilious vomiting, decreased PO intake, and chill, but no fevers. She was admitted to medicine with the primary diagnosis of Transaminitis 2/2 to obstruction by pancreatic mass.        CT Abdomen and Pelvis w/ Oral Cont and w/ IV Cont (12.18.19 @ 17:57) >    IMPRESSION: The previously noted mass involving the pancreatic head and body measuring 5.6 x 6.9 cm on the previous PET/CT scan, now measures 10.6 x 8.4 cm. Marked intrahepatic bile duct dilatation and dilatation of the common hepatic duct down to the level of the pancreatic mass.        < end of copied text >            Suspected cholangitis in pt with pancreatic CA vs tumor related biliary obstruction     - SIRS criteria met on admission        Pending/update: s/p picc, waiting for type of chemo and if there is bone marrow suppression as a side effect, need all these for nursing home, pending PT         - patient was diagnosed with pancreatic cancer diagnosis last month with elevated LFTs at outpt onc office    - LFTs improved AST- 23, ALT - 56     - Pain improvement following PTC on 12/20    - F/u brush biopsy result     -c/w Morphine PRN for pain    - IR cleared pt and rec if medically cleared, pt can be D/C outpt f/u for drain internalization +/- repeat brush biopsy, otherwise will schedule for drain internalization on Thurs or Fri and repeat biopsy if results are equivocal    -s/p Picc line for chemo        #) Suspected cholangitis in pt with pancreatic CA vs tumor related biliary obstruction     - SIRS criteria met on admission        #) Hypokalemia    - Potassium 3.1 today, repleted        #Atrial fibrillation    -c/w cardizem 120mg TID PO    - Heparin gtt goal 60-90    -Takes eliquis 5 mg BID at home, today, we will switch heparin drip to Eliquis at 7pm and d/c heparin gtt        #Pancreatic cancer    - PICC line placed in left upper arm today    -s/p 1 cycle of chemo on 12/12    - Called and left message with Dr. Vela answering service ([phone]), re: out pt chemo txt plan w/ instructions to f/u    -currently scheduled to receive chemo on Rashid 3 at [facility] location    -Hem onc following        #Hypertension    -c/w lisinopril, cardizem        #DM     -Monitor FS and start on insulin if FS > 200        #DLD    -c/w atorvastatin        #diet: Clear liquid    #DVT ppx: SCD     #GI ppx: none    #Dispo: D/C PT/ rehab following PICC placement and when cleared by ID, w/ outpt onc f/u Patient is a 84y old Female w/ pmhx of pancreatic CA dx last month on chemotherapy (gemcitabine and abraxane), a.fib on eliquis, DM, HTN, DLD, and colon CA s/p resection who presents with a chief complaint of over 1 week duration of worsening, crampy, RUQ, 8/10, constant, RUQ and epigastric abd pain that radiated into her back with associated bilious vomiting, decreased PO intake, and chill, but no fevers. She was admitted to medicine with the primary diagnosis of Transaminitis 2/2 to obstruction by pancreatic mass.        CT Abdomen and Pelvis w/ Oral Cont and w/ IV Cont (12.18.19 @ 17:57) >    IMPRESSION: The previously noted mass involving the pancreatic head and body measuring 5.6 x 6.9 cm on the previous PET/CT scan, now measures 10.6 x 8.4 cm. Marked intrahepatic bile duct dilatation and dilatation of the common hepatic duct down to the level of the pancreatic mass.        <    patient had elevated LFT, bilirubin, along with abdominal, patient was IR and s/p external drain with brush biopsy. patient had PTC drain now, IR will     follow the patient outside of hospital        Patient has pancreatic cancer > patient has follow up visit with hematology/oncology to discuss if patient needs chemo or not, which will be decided    upon follow up visit on January 3rd. Patient is a 84y old Female w/ pmhx of pancreatic CA dx last month on chemotherapy (gemcitabine and abraxane), a.fib on eliquis, DM, HTN, DLD, and colon CA s/p resection who presents with a chief complaint of over 1 week duration of worsening, crampy, RUQ, 8/10, constant, RUQ and epigastric abd pain that radiated into her back with associated bilious vomiting, decreased PO intake, and chill, but no fevers. She was admitted to medicine with the primary diagnosis of Transaminitis 2/2 to obstruction by pancreatic mass.        CT Abdomen and Pelvis w/ Oral Cont and w/ IV Cont (12.18.19 @ 17:57) >    IMPRESSION: The previously noted mass involving the pancreatic head and body measuring 5.6 x 6.9 cm on the previous PET/CT scan, now measures 10.6 x 8.4 cm. Marked intrahepatic bile duct dilatation and dilatation of the common hepatic duct down to the level of the pancreatic mass.        <    patient had elevated LFT, bilirubin, along with abdominal, patient was IR and s/p external drain with brush biopsy. patient had PTC drain now, IR will     follow the patient outside of hospital        Patient has pancreatic cancer > patient has follow up visit with hematology/oncology to discuss if patient needs chemo or not, which will be decided    upon follow up visit on January 3rd.        Attending NotE:    Patient seen and examined independently. I personally had a face-to-face encounter with the patient, examined the patient myself and reviewed the plan of care with the housestaff. Agree with resident's note but my note supersedes that of the resident in the matters hereby listed.     Interventional Radiologist placed Exteral WILLIAM drain and performed brush biopsy, Follow up Dr. Nielsen for Oncology. After brush biopsy results available - if no other biopsies planned - discuss with IR about internalizing the drain. Repeat CMP in 1 week. If notice fever- return to ER. Patient is a 84y old Female w/ pmhx of pancreatic CA dx last month on chemotherapy (gemcitabine and abraxane), a.fib on eliquis, DM, HTN, DLD, and colon CA s/p resection who presents with a chief complaint of over 1 week duration of worsening, crampy, RUQ, 8/10, constant, RUQ and epigastric abd pain that radiated into her back with associated bilious vomiting, decreased PO intake, and chill, but no fevers. She was admitted to medicine with the primary diagnosis of Transaminitis 2/2 to obstruction by pancreatic mass.        CT Abdomen and Pelvis w/ Oral Cont and w/ IV Cont (12.18.19 @ 17:57) >    IMPRESSION: The previously noted mass involving the pancreatic head and body measuring 5.6 x 6.9 cm on the previous PET/CT scan, now measures 10.6 x 8.4 cm. Marked intrahepatic bile duct dilatation and dilatation of the common hepatic duct down to the level of the pancreatic mass.        <    patient had elevated LFT, bilirubin, along with abdominal, patient was seen by IR and s/p external drain with brush biopsy. patient had PTC drain now, IR will     follow the patient outside of hospital for internalization. Brush biopsy result was insignificant. Given the history of pancreatic cancer there is no need for repeat biopsy.        IR will follow the patient for internalization         Patient has pancreatic cancer > patient has follow up visit with hematology/oncology to discuss if patient needs chemo or not, which will be decided    upon follow up visit on January 3rd.

## 2019-12-24 NOTE — CHART NOTE - NSCHARTNOTEFT_GEN_A_CORE
<<<RESIDENT DISCHARGE NOTE>>>     ZHAO KUMAR  MRN-[id number]    VITAL SIGNS:  T(F): 97.6 (12-24-19 @ 05:46), Max: 97.6 (12-24-19 @ 05:46)  HR: 88 (12-24-19 @ 05:46)  BP: 122/60 (12-24-19 @ 05:46)  SpO2: --      PHYSICAL EXAMINATION:  General: NAD, resting comfortably,   Head & Neck: NC-AT  Pulmonary: clear  Cardiovascular: s1/s2  Gastrointestinal/Abdomen & Pelvis: soft, nontender, BS+, PTC drain on right  Neurologic/Motor: AAOX3    TEST RESULTS:                        12.3   6.09  )-----------( 174      ( 24 Dec 2019 06:23 )             34.2       12-24    128<L>  |  95<L>  |  4<L>  ----------------------------<  171<H>  4.6   |  21  |  <0.5<L>    Ca    8.5      24 Dec 2019 06:23  Mg     1.8     12-24    TPro  5.0<L>  /  Alb  2.8<L>  /  TBili  1.3<H>  /  DBili  x   /  AST  17  /  ALT  39  /  AlkPhos  721<H>  12-24      FINAL DISCHARGE INTERVIEW:  Resident(s) Present: (Name: Lonnie Granados RN Present: (Name:  ___________)    DISCHARGE MEDICATION RECONCILIATION  reviewed with Attending (Name: Dr. Allen    DISPOSITION:   [  ] Home,    [  ] Home with Visiting Nursing Services,   [   x ]  SNF/ NH,    [   ] Acute Rehab (4A),   [   ] Other (Specify:_________)

## 2019-12-24 NOTE — PHYSICAL THERAPY INITIAL EVALUATION ADULT - PLANNED THERAPY INTERVENTIONS, PT EVAL
gait training/balance training/bed mobility training/postural re-education/strengthening/transfer training

## 2019-12-24 NOTE — PROGRESS NOTE ADULT - SUBJECTIVE AND OBJECTIVE BOX
pt with locally advanced pancreatic cancer  sp percutaneous drainage by IR 5 DAYS AGO   LFTS improving but pt still not eating only taking pills vomits the food if tries to eat so scared   also has abdominal pain   still not able to stand neecd pt

## 2019-12-24 NOTE — DISCHARGE NOTE PROVIDER - CARE PROVIDER_API CALL
Boyd Clay)  Radiology  [address] [place] [address]  Phone: [phone]  Fax: [phone]  Follow Up Time: 1 week Boyd Clay)  Radiology  [address] [place] [id number]  Phone: [phone]  Fax: [phone]  Follow Up Time: 1 week    Marcus Bravo)  Internal Medicine; Medical Oncology  [address] [place] [address]  Phone: [phone]  Fax: [phone]  Follow Up Time:

## 2019-12-24 NOTE — DISCHARGE NOTE PROVIDER - NSDCCPCAREPLAN_GEN_ALL_CORE_FT
PRINCIPAL DISCHARGE DIAGNOSIS  Diagnosis: Transaminitis  Assessment and Plan of Treatment: Interventional Radiologist placed Exteral WILLIAM drain and performed brush biopsy, Please follow up with IR      SECONDARY DISCHARGE DIAGNOSES  Diagnosis: Pancreatic cancer  Assessment and Plan of Treatment:     Diagnosis: Atrial fibrillation with RVR  Assessment and Plan of Treatment:     Diagnosis: Intractable abdominal pain  Assessment and Plan of Treatment: PRINCIPAL DISCHARGE DIAGNOSIS  Diagnosis: Transaminitis  Assessment and Plan of Treatment: Interventional Radiologist placed Exteral WILLIAM drain and performed brush biopsy, Follow up Dr. Nielsen for Oncology. After brush biopsy results available - if no other biopsies planned - discuss with IR about internalizing the drain. Repeat CMP in 1 week. If notice fever- return to ER.      SECONDARY DISCHARGE DIAGNOSES  Diagnosis: Pancreatic cancer  Assessment and Plan of Treatment: f/u Onc-Dr Nielsen to decide if you need any more chemo. PICC line was inserted this admission.    Diagnosis: Atrial fibrillation with RVR  Assessment and Plan of Treatment: c/w Eliquis.    Diagnosis: Intractable abdominal pain  Assessment and Plan of Treatment: PRINCIPAL DISCHARGE DIAGNOSIS  Diagnosis: Transaminitis  Assessment and Plan of Treatment: Interventional Radiologist placed Exteral WILLIAM drain and performed brush biopsy, Follow up Dr. Nielsen for Oncology. After brush biopsy results available - if no other biopsies planned - discuss with IR about internalizing the drain. Repeat CMP in 1 week. If notice fever- return to ER.      SECONDARY DISCHARGE DIAGNOSES  Diagnosis: Pancreatic cancer  Assessment and Plan of Treatment: f/u Onc-Dr Nielsen to decide if you need any more chemo. PICC line was inserted this admission.    Diagnosis: Atrial fibrillation with RVR  Assessment and Plan of Treatment: c/w Eliquis.    Diagnosis: Intractable abdominal pain  Assessment and Plan of Treatment: secondary to transaminitis

## 2019-12-24 NOTE — DISCHARGE NOTE PROVIDER - PROVIDER TOKENS
PROVIDER:[TOKEN:[[id number]],FOLLOWUP:[1 week]] PROVIDER:[TOKEN:[[id number]],FOLLOWUP:[1 week]],PROVIDER:[TOKEN:[[id number]]]

## 2019-12-24 NOTE — PROGRESS NOTE ADULT - ASSESSMENT
PROBLEMS  1. SIRS (T<96.8F,  Pulse>90, wbc<4)  2.  Suspected cholangitis in pt with pancreatic CA vs tumor related biliary obstruction  AP 1416,  to 1123/152 (improving)  CT: The previously noted mass involving the pancreatic head and body measuring 5.6 x 6.9 cm on the previous PET/CT scan, now measures 10.6 x 8.4 cm. Marked intrahepatic bile duct dilatation and dilatation of the common hepatic duct down to the level of the pancreatic mass.  BCx 12/19, 20 NG  Bile fluid 12/20 no PMNs, no org, NG cultures      PLAN  Off ABx since 12/23

## 2019-12-24 NOTE — PROGRESS NOTE ADULT - GASTROINTESTINAL DETAILS
soft/nontender/no rigidity/no rebound tenderness/no guarding
nontender/no rebound tenderness/no rigidity/soft/no guarding

## 2019-12-25 LAB
ALBUMIN SERPL ELPH-MCNC: 2.7 G/DL — LOW (ref 3.5–5.2)
ALP SERPL-CCNC: 684 U/L — HIGH (ref 30–115)
ALT FLD-CCNC: 29 U/L — SIGNIFICANT CHANGE UP (ref 0–41)
ANION GAP SERPL CALC-SCNC: 15 MMOL/L — HIGH (ref 7–14)
AST SERPL-CCNC: 19 U/L — SIGNIFICANT CHANGE UP (ref 0–41)
BILIRUB SERPL-MCNC: 1.4 MG/DL — HIGH (ref 0.2–1.2)
BUN SERPL-MCNC: 6 MG/DL — LOW (ref 10–20)
CALCIUM SERPL-MCNC: 8.1 MG/DL — LOW (ref 8.5–10.1)
CHLORIDE SERPL-SCNC: 95 MMOL/L — LOW (ref 98–110)
CO2 SERPL-SCNC: 19 MMOL/L — SIGNIFICANT CHANGE UP (ref 17–32)
CREAT SERPL-MCNC: <0.5 MG/DL — LOW (ref 0.7–1.5)
CULTURE RESULTS: SIGNIFICANT CHANGE UP
GLUCOSE BLDC GLUCOMTR-MCNC: 118 MG/DL — HIGH (ref 70–99)
GLUCOSE BLDC GLUCOMTR-MCNC: 132 MG/DL — HIGH (ref 70–99)
GLUCOSE BLDC GLUCOMTR-MCNC: 134 MG/DL — HIGH (ref 70–99)
GLUCOSE BLDC GLUCOMTR-MCNC: 135 MG/DL — HIGH (ref 70–99)
GLUCOSE SERPL-MCNC: 157 MG/DL — HIGH (ref 70–99)
HCT VFR BLD CALC: 33.4 % — LOW (ref 37–47)
HGB BLD-MCNC: 11.6 G/DL — LOW (ref 12–16)
MCHC RBC-ENTMCNC: 32.7 PG — HIGH (ref 27–31)
MCHC RBC-ENTMCNC: 34.7 G/DL — SIGNIFICANT CHANGE UP (ref 32–37)
MCV RBC AUTO: 94.1 FL — SIGNIFICANT CHANGE UP (ref 81–99)
NRBC # BLD: 0 /100 WBCS — SIGNIFICANT CHANGE UP (ref 0–0)
PLATELET # BLD AUTO: 206 K/UL — SIGNIFICANT CHANGE UP (ref 130–400)
POTASSIUM SERPL-MCNC: 4.5 MMOL/L — SIGNIFICANT CHANGE UP (ref 3.5–5)
POTASSIUM SERPL-SCNC: 4.5 MMOL/L — SIGNIFICANT CHANGE UP (ref 3.5–5)
PROT SERPL-MCNC: 4.7 G/DL — LOW (ref 6–8)
RBC # BLD: 3.55 M/UL — LOW (ref 4.2–5.4)
RBC # FLD: 15.7 % — HIGH (ref 11.5–14.5)
SODIUM SERPL-SCNC: 129 MMOL/L — LOW (ref 135–146)
SPECIMEN SOURCE: SIGNIFICANT CHANGE UP
WBC # BLD: 5.56 K/UL — SIGNIFICANT CHANGE UP (ref 4.8–10.8)
WBC # FLD AUTO: 5.56 K/UL — SIGNIFICANT CHANGE UP (ref 4.8–10.8)

## 2019-12-25 PROCEDURE — 99233 SBSQ HOSP IP/OBS HIGH 50: CPT

## 2019-12-25 RX ADMIN — MORPHINE SULFATE 2 MILLIGRAM(S): 50 CAPSULE, EXTENDED RELEASE ORAL at 16:36

## 2019-12-25 RX ADMIN — LATANOPROST 1 DROP(S): 0.05 SOLUTION/ DROPS OPHTHALMIC; TOPICAL at 22:22

## 2019-12-25 RX ADMIN — DORZOLAMIDE HYDROCHLORIDE 1 DROP(S): 20 SOLUTION/ DROPS OPHTHALMIC at 13:40

## 2019-12-25 RX ADMIN — Medication 120 MILLIGRAM(S): at 13:40

## 2019-12-25 RX ADMIN — ATORVASTATIN CALCIUM 20 MILLIGRAM(S): 80 TABLET, FILM COATED ORAL at 22:21

## 2019-12-25 RX ADMIN — Medication 120 MILLIGRAM(S): at 05:57

## 2019-12-25 RX ADMIN — Medication 3 MILLIGRAM(S): at 22:22

## 2019-12-25 RX ADMIN — DORZOLAMIDE HYDROCHLORIDE 1 DROP(S): 20 SOLUTION/ DROPS OPHTHALMIC at 05:58

## 2019-12-25 RX ADMIN — DORZOLAMIDE HYDROCHLORIDE 1 DROP(S): 20 SOLUTION/ DROPS OPHTHALMIC at 22:22

## 2019-12-25 RX ADMIN — MORPHINE SULFATE 2 MILLIGRAM(S): 50 CAPSULE, EXTENDED RELEASE ORAL at 00:52

## 2019-12-25 RX ADMIN — MORPHINE SULFATE 2 MILLIGRAM(S): 50 CAPSULE, EXTENDED RELEASE ORAL at 22:57

## 2019-12-25 RX ADMIN — Medication 120 MILLIGRAM(S): at 22:21

## 2019-12-25 RX ADMIN — CHLORHEXIDINE GLUCONATE 1 APPLICATION(S): 213 SOLUTION TOPICAL at 05:58

## 2019-12-25 RX ADMIN — MORPHINE SULFATE 2 MILLIGRAM(S): 50 CAPSULE, EXTENDED RELEASE ORAL at 22:27

## 2019-12-25 RX ADMIN — APIXABAN 5 MILLIGRAM(S): 2.5 TABLET, FILM COATED ORAL at 05:57

## 2019-12-25 RX ADMIN — MORPHINE SULFATE 2 MILLIGRAM(S): 50 CAPSULE, EXTENDED RELEASE ORAL at 16:47

## 2019-12-25 RX ADMIN — LISINOPRIL 40 MILLIGRAM(S): 2.5 TABLET ORAL at 05:57

## 2019-12-25 RX ADMIN — Medication 20 MILLIGRAM(S): at 11:34

## 2019-12-25 RX ADMIN — APIXABAN 5 MILLIGRAM(S): 2.5 TABLET, FILM COATED ORAL at 17:36

## 2019-12-25 RX ADMIN — SENNA PLUS 2 TABLET(S): 8.6 TABLET ORAL at 11:34

## 2019-12-25 NOTE — PROGRESS NOTE ADULT - SUBJECTIVE AND OBJECTIVE BOX
ZHAO KUMAR  [facility] T2-3A 016 B ([facility] T2-3A)            Patient was evaluated and examined  by bedside, reports no abdominal pain, tolerating clear liquid diet well, post d/c moreno yesterday, nurse reported today post voidal urine volume is 800ml.                REVIEW OF SYSTEMS:  please see pertinent positives mentioned above, all other 12 ROS negative      T(C): , Max: 36.6 (12-24-19 @ 20:41)  HR: 93 (12-25-19 @ 05:00)  BP: 109/66 (12-25-19 @ 05:00)  RR: 16 (12-25-19 @ 05:00)  SpO2: --  CAPILLARY BLOOD GLUCOSE      POCT Blood Glucose.: 135 mg/dL (25 Dec 2019 07:42)  POCT Blood Glucose.: 142 mg/dL (24 Dec 2019 11:22)      PHYSICAL EXAM:  General: NAD, AAOX3, patient is laying comfortably in bed  HEENT: AT, NC, left under eye bruise, Supple, NO JVD, NO CB  Lungs: CTA B/L, no wheezing, no rhonchi  chest: left sided ribcage area pain.  CVS: normal S1, S2, RRR, NO M/G/R  Abdomen: soft, bowel sounds present, non-tender, mid abdominal hernia with palpable mass, non-distended, right upper quadrant billiary drain present.  Extremities: no edema, no clubbing, no cyanosis, positive peripheral pulses b/l  Neuro: no acute focal neurological deficits, generalized body weakness  Skin: as above.      LAB  CBC  Date: 12-25-19 @ 06:54  Mean cell Gsrdrfmyco73.7  Mean cell Hemoglobin Conc34.7  Mean cell Volum 94.1  Platelet count-Automate 206  RBC Count 3.55  Red Cell Distrib Width15.7  WBC Count5.56  % Albumin, Urine--  Hematocrit 33.4  Hemoglobin 11.6  CBC  Date: 12-24-19 @ 06:23  Mean cell Mxkrhbdnlv29.8  Mean cell Hemoglobin Conc36.0  Mean cell Volum 91.2  Platelet count-Automate 174  RBC Count 3.75  Red Cell Distrib Width15.2  WBC Count6.09  % Albumin, Urine--  Hematocrit 34.2  Hemoglobin 12.3  CBC  Date: 12-23-19 @ 05:36  Mean cell Tgkfcgjccw87.2  Mean cell Hemoglobin Conc34.7  Mean cell Volum 93.0  Platelet count-Automate 173  RBC Count 3.69  Red Cell Distrib Width15.0  WBC Count5.57  % Albumin, Urine--  Hematocrit 34.3  Hemoglobin 11.9  CBC  Date: 12-22-19 @ 06:30  Mean cell Lcnetipkoz32.8  Mean cell Hemoglobin Conc35.6  Mean cell Volum 92.0  Platelet count-Automate 143  RBC Count 3.48  Red Cell Distrib Width15.1  WBC Count4.74  % Albumin, Urine--  Hematocrit 32.0  Hemoglobin 11.4  CBC  Date: 12-20-19 @ 04:30  Mean cell Qsamfvoewa98.3  Mean cell Hemoglobin Conc34.6  Mean cell Volum 93.4  Platelet count-Automate 153  RBC Count 3.47  Red Cell Distrib Width15.2  WBC Count4.80  % Albumin, Urine--  Hematocrit 32.4  Hemoglobin 11.2  CBC  Date: 12-19-19 @ 05:48  Mean cell Cjgyuqxaqo54.8  Mean cell Hemoglobin Conc35.0  Mean cell Volum 93.6  Platelet count-Automate 215  RBC Count 3.57  Red Cell Distrib Width15.2  WBC Count3.95  % Albumin, Urine--  Hematocrit 33.4  Hemoglobin 11.7  CBC  Date: 12-18-19 @ 16:10  Mean cell Mmhzoudnbp48.2  Mean cell Hemoglobin Conc35.1  Mean cell Volum 94.3  Platelet count-Automate 221  RBC Count 3.89  Red Cell Distrib Width15.0  WBC Count2.82  % Albumin, Urine--  Hematocrit 36.7  Hemoglobin 12.9    BMP  12-25-19 @ 06:54  Blood Gas Arterial-Calcium,Ionized--  Blood Urea Nitrogen, Serum 6 mg/dL<L> [10 - 20]  Carbon Dioxide, Serum19 mmol/L [17 - 32]  Chloride, Serum95 mmol/L<L> [98 - 110]  Creatinie, Serum<0.5 mg/dL<L> [0.7 - 1.5]  Glucose, Cdmfe891 mg/dL<H> [70 - 99]  Potassium, Serum4.5 mmol/L [3.5 - 5.0]  Sodium, Serum 129 mmol/L<L> [135 - 146]  BMP  12-24-19 @ 06:23  Blood Gas Arterial-Calcium,Ionized--  Blood Urea Nitrogen, Serum 4 mg/dL<L> [10 - 20]  Carbon Dioxide, Serum21 mmol/L [17 - 32]  Chloride, Serum95 mmol/L<L> [98 - 110]  Creatinie, Serum<0.5 mg/dL<L> [0.7 - 1.5]  Glucose, Zoymj245 mg/dL<H> [70 - 99]  Potassium, Serum4.6 mmol/L [3.5 - 5.0]  Sodium, Serum 128 mmol/L<L> [135 - 146]  [facility]  12-23-19 @ 05:36  Blood Gas Arterial-Calcium,Ionized--  Blood Urea Nitrogen, Serum 4 mg/dL<L> [10 - 20]  Carbon Dioxide, Serum20 mmol/L [17 - 32]  Chloride, Serum95 mmol/L<L> [98 - 110]  Creatinie, Serum<0.5 mg/dL<L> [0.7 - 1.5]  Glucose, Mpomd327 mg/dL<H> [70 - 99]  Potassium, Serum3.1 mmol/L<L> [3.5 - 5.0]  Sodium, Serum 130 mmol/L<L> [135 - 146]  [facility]  12-22-19 @ 06:30  Blood Gas Arterial-Calcium,Ionized--  Blood Urea Nitrogen, Serum 5 mg/dL<L> [10 - 20]  Carbon Dioxide, Serum20 mmol/L [17 - 32]  Chloride, Serum95 mmol/L<L> [98 - 110]  Creatinie, Serum<0.5 mg/dL<L> [0.7 - 1.5]  Glucose, Vwhrq545 mg/dL<H> [70 - 99]  Potassium, Serum2.8 mmol/L<L> [3.5 - 5.0]  Sodium, Serum 130 mmol/L<L> [135 - 146]              Microbiology:    Culture - Urine (collected 12-22-19 @ 23:04)  Source: .Urine Catheterized  Final Report (12-24-19 @ 07:00):    No growth    Culture - Acid Fast (collected 12-20-19 @ 10:00)    Culture - Fungal, Body Fluid (collected 12-20-19 @ 10:00)  Source: Bile None  Preliminary Report (12-23-19 @ 08:58):    Testing in progress    Culture - Body Fluid with Gram Stain (collected 12-20-19 @ 10:00)  Source: Bile None  Gram Stain (12-20-19 @ 22:26):    No polymorphonuclear cells seen per low power field    No organisms seen per oil power field  Preliminary Report (12-21-19 @ 17:46):    No growth    Culture - Blood (collected 12-20-19 @ 04:30)  Source: .Blood Blood  Preliminary Report (12-21-19 @ 14:01):    No growth to date.    Culture - Blood (collected 12-19-19 @ 05:48)  Source: .Blood None  Final Report (12-24-19 @ 18:00):    No growth at 5 days.        Medications:  apixaban 5 milliGRAM(s) Oral two times a day  atorvastatin 20 milliGRAM(s) Oral at bedtime  chlorhexidine 4% Liquid 1 Application(s) Topical <User Schedule>  diltiazem    Tablet 120 milliGRAM(s) Oral three times a day  dorzolamide 2% Ophthalmic Solution 1 Drop(s) Left EYE three times a day  FLUoxetine 20 milliGRAM(s) Oral daily  ibuprofen  Tablet. 400 milliGRAM(s) Oral once PRN  influenza   Vaccine 0.5 milliLiter(s) IntraMuscular once  latanoprost 0.005% Ophthalmic Solution 1 Drop(s) Right EYE at bedtime  lisinopril 40 milliGRAM(s) Oral daily  melatonin 3 milliGRAM(s) Oral at bedtime  morphine  - Injectable 2 milliGRAM(s) IV Push every 6 hours PRN  senna 2 Tablet(s) Oral daily  simethicone 80 milliGRAM(s) Chew two times a day PRN        Assessment and Plan:  84y old Female w/ pmhx of pancreatic CA dx last month on chemotherapy (gemcitabine and abraxane), a.fib on eliquis, DM, HTN, DLD, and colon CA s/p resection who presents 1 week duration of worsening, crampy, RUQ pain  with associated bilious vomiting, decreased PO intake, and chill, but no fevers. She is currently admitted to medicine with the primary diagnosis of Transaminitis 2/2 to obstruction by pancreatic mass. patient is s/p Placement of an 8F external-internal biliary drain on 12/20/19 by IR    #Transaminitis 2/2 to obstruction  - pancreatic cancer with elevated LFTs at outpt onc office  -Patient is s/p PTC w/ IR  biliary catheter placement, has external drain and s/p brush biopsy on 12/20/19. subjectively better.  - LFTs improving.   -advance diet as tolerated     Outpatient onc f/u after brush biopsy results. IF no more brush biopsy planned, IR will do internalization of drain outpatient.   Onc will decide outpatient if and when she'll be continuing chemo. Got a PICC line here.       -CT abdomen showed 10.6 x 8.4 cm mass involving the 10.6 x 8.4 cm pancreatic head and body w/marked intrahepatic bile duct dilatation and dilatation of the common hepatic duct down to the level of the pancreatic mass    #) Acute urinary retention post d/c moreno, bladder scan every shift with straight cath prn.       #) Suspected cholangitis in pt with pancreatic CA vs tumor related biliary obstruction   - SIRS criteria met on admission. No infection found. so NOT SEPSIS.  - Currently afebrile, vitals wnl  - blood cultures NGTD.   Per ID, ok to d/c abx.       #Atrial fibrillation  -c/w cardizem 120mg TID PO  -restarted on Eliquis    #Pancreatic cancer  -On gemcitabine and Abraxane  -s/p 1 cycle of chemo on 12/12  -Hem onc following,  Got PICC for chemo on 12/23.       #Hypertension  -c/w lisinopril, cardizem    #DM   -Monitor FS and start on insulin if FS > 200    #DLD  -c/w atorvastatin    #HYpokelamiea: Repleted. monitor.      #diet: full liquids  #DVT ppx: SCD   #GI ppx: none  #Dispo: Probable SNF in 24 hours.    #Progress Note Handoff: advance diet slowly as tolerated, post voiding bedside bladder scan monitoring for next 24 hours   Family discussion: yes Disposition: SNF in 24 hours

## 2019-12-25 NOTE — CHART NOTE - NSCHARTNOTEFT_GEN_A_CORE
Registered Dietitian Follow-Up     ****Scroll for RD Recommendations at bottom of note****    Patient Profile Reviewed                           Yes []   No []     Nutrition History Previously Obtained        Yes []  No []       Pertinent Subjective Information: Pt on clears, not meeting needs, agreeable to ensure clears until diet advancement     Pertinent Medical Interventions: Tolerating clear liquid pain without abdominal pain. LFTs improving. Heme onc follows.  X Transaminitis 2/2 obstruction in setting of pancreatic cancer, s/p PTC w/ IR biliary cath placement, has external drain and s/p brush bx on 12/20. Pt on chemo (gemcitabine and abraxane)  X suspected cholangitis in setting of pancreatic ca vs tumor related biliary obstruction. currently afebrile.     Diet order: clear liquids     Anthropometrics:  - Ht. 160.02cm  - Wt. 12/22 63.2kg  - %wt change  - BMI 24.7  - IBW 52.3kg+/-10%     Pertinent Lab Data: (12/25) H/H 11.6/33.4, Na 129, Cl 95, BUN 6, Cr <0.5, s gluc 157, elevated LFTs (improving)  recent  135 142 144 162      Pertinent Meds: eliquis, mylicon, lipitor, zestril, morphine, senna,      Physical Findings:  - Appearance: AAO  - GI function: + BM 12/24  - Tubes:  - Oral/Mouth cavity: WDL   - Skin: WDL except surgical incision     Nutrition Requirements  Weight Used: 63.2kg, needs per initial assessment- continue:     Estimated Calorie Needs: [id number] x AF 1.3-1.9=3448-4629stli/day -Due to cancer  Estimated Protein Needs: 82-88grams/day (1.3-1.4grams/kg of admit weight) -Due to cancer  Estimated Fluid Needs: 1387-1494mL/day (1mL/kcal)     Nutrient Intake: not meeting needs on clear liquids        [] Previous Nutrition Diagnosis: predicted suboptimal energy intake            [x] Ongoing          [] Resolved    [] No active nutrition diagnosis identified at this time     Nutrition Intervention meals and snacks, med food supplement  RECOMMENDATIONS: 1. Continue clear liquids, advance per LIP. RD would not rec diet modifiers at present to optimize intake. 2. Add Ensure Clears TID.     Goal/Expected Outcome: Pt to consume >75% of clear liquids and supplement within 3 days, diet advancement per LIP     Indicator/Monitoring: RD to monitor diet order, energy intake, NFPF, body comp, glucose and renal profile.    Pt at risk f/u 3 days Registered Dietitian Follow-Up     ****Scroll for RD Recommendations at bottom of note****    Patient Profile Reviewed                           Yes []   No []     Nutrition History Previously Obtained        Yes []  No []       Pertinent Subjective Information: Pt now ordered on full liquids as of today, however, pt received clear liquids until (and including) , not meeting needs, agreeable to ensure clears until diet advancement     Pertinent Medical Interventions: Tolerating clear liquid pain without abdominal pain. LFTs improving. Heme onc follows.  X Transaminitis 2/2 obstruction in setting of pancreatic cancer, s/p PTC w/ IR biliary cath placement, has external drain and s/p brush bx on 12/20. Pt on chemo (gemcitabine and abraxane)  X suspected cholangitis in setting of pancreatic ca vs tumor related biliary obstruction. currently afebrile.     Diet order: advanced to full liquids     Anthropometrics:  - Ht. 160.02cm  - Wt. 12/22 63.2kg  - %wt change  - BMI 24.7  - IBW 52.3kg+/-10%     Pertinent Lab Data: (12/25) H/H 11.6/33.4, Na 129, Cl 95, BUN 6, Cr <0.5, s gluc 157, elevated LFTs (improving)  recent  135 142 144 162      Pertinent Meds: eliquis, mylicon, lipitor, zestril, morphine, senna,      Physical Findings:  - Appearance: AAO  - GI function: + BM 12/24  - Tubes:  - Oral/Mouth cavity: WDL   - Skin: WDL except surgical incision     Nutrition Requirements  Weight Used: 63.2kg, needs per initial assessment- continue:     Estimated Calorie Needs: [id number] x AF 1.3-1.6=0212-1572mgot/day -Due to cancer  Estimated Protein Needs: 82-88grams/day (1.3-1.4grams/kg of admit weight) -Due to cancer  Estimated Fluid Needs: 1387-1494mL/day (1mL/kcal)     Nutrient Intake: not meeting needs on clear liquids        [] Previous Nutrition Diagnosis: predicted suboptimal energy intake            [x] Ongoing          [] Resolved    [] No active nutrition diagnosis identified at this time     Nutrition Intervention meals and snacks, med food supplement  RECOMMENDATIONS: 1. Continue clear liquids, advance per LIP. RD would not rec diet modifiers at present to optimize intake. 2. Add Ensure Clears TID (pt not yet willing to try ensure enlive or compact)     Goal/Expected Outcome: Pt to consume >75% of clear liquids and supplement within 3 days, diet advancement per LIP     Indicator/Monitoring: RD to monitor diet order, energy intake, NFPF, body comp, glucose and renal profile.    Pt at risk f/u 3 days

## 2019-12-26 LAB
ALBUMIN SERPL ELPH-MCNC: 2.6 G/DL — LOW (ref 3.5–5.2)
ALP SERPL-CCNC: 614 U/L — HIGH (ref 30–115)
ALT FLD-CCNC: 23 U/L — SIGNIFICANT CHANGE UP (ref 0–41)
ANION GAP SERPL CALC-SCNC: 15 MMOL/L — HIGH (ref 7–14)
AST SERPL-CCNC: 19 U/L — SIGNIFICANT CHANGE UP (ref 0–41)
BILIRUB SERPL-MCNC: 1.2 MG/DL — SIGNIFICANT CHANGE UP (ref 0.2–1.2)
BUN SERPL-MCNC: 7 MG/DL — LOW (ref 10–20)
CALCIUM SERPL-MCNC: 8.1 MG/DL — LOW (ref 8.5–10.1)
CHLORIDE SERPL-SCNC: 98 MMOL/L — SIGNIFICANT CHANGE UP (ref 98–110)
CO2 SERPL-SCNC: 19 MMOL/L — SIGNIFICANT CHANGE UP (ref 17–32)
CREAT SERPL-MCNC: <0.5 MG/DL — LOW (ref 0.7–1.5)
GLUCOSE BLDC GLUCOMTR-MCNC: 125 MG/DL — HIGH (ref 70–99)
GLUCOSE BLDC GLUCOMTR-MCNC: 126 MG/DL — HIGH (ref 70–99)
GLUCOSE BLDC GLUCOMTR-MCNC: 138 MG/DL — HIGH (ref 70–99)
GLUCOSE BLDC GLUCOMTR-MCNC: 142 MG/DL — HIGH (ref 70–99)
GLUCOSE SERPL-MCNC: 150 MG/DL — HIGH (ref 70–99)
HCT VFR BLD CALC: 33.9 % — LOW (ref 37–47)
HGB BLD-MCNC: 11.7 G/DL — LOW (ref 12–16)
MCHC RBC-ENTMCNC: 32.5 PG — HIGH (ref 27–31)
MCHC RBC-ENTMCNC: 34.5 G/DL — SIGNIFICANT CHANGE UP (ref 32–37)
MCV RBC AUTO: 94.2 FL — SIGNIFICANT CHANGE UP (ref 81–99)
NRBC # BLD: 0 /100 WBCS — SIGNIFICANT CHANGE UP (ref 0–0)
PLATELET # BLD AUTO: 241 K/UL — SIGNIFICANT CHANGE UP (ref 130–400)
POTASSIUM SERPL-MCNC: 4.2 MMOL/L — SIGNIFICANT CHANGE UP (ref 3.5–5)
POTASSIUM SERPL-SCNC: 4.2 MMOL/L — SIGNIFICANT CHANGE UP (ref 3.5–5)
PROT SERPL-MCNC: 4.7 G/DL — LOW (ref 6–8)
RBC # BLD: 3.6 M/UL — LOW (ref 4.2–5.4)
RBC # FLD: 15.6 % — HIGH (ref 11.5–14.5)
SODIUM SERPL-SCNC: 132 MMOL/L — LOW (ref 135–146)
WBC # BLD: 4.96 K/UL — SIGNIFICANT CHANGE UP (ref 4.8–10.8)
WBC # FLD AUTO: 4.96 K/UL — SIGNIFICANT CHANGE UP (ref 4.8–10.8)

## 2019-12-26 PROCEDURE — 99233 SBSQ HOSP IP/OBS HIGH 50: CPT

## 2019-12-26 RX ORDER — ONDANSETRON 8 MG/1
4 TABLET, FILM COATED ORAL EVERY 8 HOURS
Refills: 0 | Status: DISCONTINUED | OUTPATIENT
Start: 2019-12-26 | End: 2019-12-27

## 2019-12-26 RX ADMIN — MORPHINE SULFATE 2 MILLIGRAM(S): 50 CAPSULE, EXTENDED RELEASE ORAL at 05:46

## 2019-12-26 RX ADMIN — DORZOLAMIDE HYDROCHLORIDE 1 DROP(S): 20 SOLUTION/ DROPS OPHTHALMIC at 21:45

## 2019-12-26 RX ADMIN — Medication 120 MILLIGRAM(S): at 05:44

## 2019-12-26 RX ADMIN — LISINOPRIL 40 MILLIGRAM(S): 2.5 TABLET ORAL at 05:45

## 2019-12-26 RX ADMIN — Medication 120 MILLIGRAM(S): at 13:06

## 2019-12-26 RX ADMIN — MORPHINE SULFATE 2 MILLIGRAM(S): 50 CAPSULE, EXTENDED RELEASE ORAL at 19:59

## 2019-12-26 RX ADMIN — LATANOPROST 1 DROP(S): 0.05 SOLUTION/ DROPS OPHTHALMIC; TOPICAL at 21:46

## 2019-12-26 RX ADMIN — ONDANSETRON 4 MILLIGRAM(S): 8 TABLET, FILM COATED ORAL at 13:06

## 2019-12-26 RX ADMIN — SENNA PLUS 2 TABLET(S): 8.6 TABLET ORAL at 11:09

## 2019-12-26 RX ADMIN — Medication 20 MILLIGRAM(S): at 11:09

## 2019-12-26 RX ADMIN — APIXABAN 5 MILLIGRAM(S): 2.5 TABLET, FILM COATED ORAL at 05:44

## 2019-12-26 RX ADMIN — APIXABAN 5 MILLIGRAM(S): 2.5 TABLET, FILM COATED ORAL at 17:06

## 2019-12-26 RX ADMIN — Medication 3 MILLIGRAM(S): at 21:46

## 2019-12-26 RX ADMIN — ONDANSETRON 4 MILLIGRAM(S): 8 TABLET, FILM COATED ORAL at 21:45

## 2019-12-26 RX ADMIN — ATORVASTATIN CALCIUM 20 MILLIGRAM(S): 80 TABLET, FILM COATED ORAL at 21:46

## 2019-12-26 RX ADMIN — Medication 120 MILLIGRAM(S): at 21:45

## 2019-12-26 RX ADMIN — MORPHINE SULFATE 2 MILLIGRAM(S): 50 CAPSULE, EXTENDED RELEASE ORAL at 13:07

## 2019-12-26 RX ADMIN — DORZOLAMIDE HYDROCHLORIDE 1 DROP(S): 20 SOLUTION/ DROPS OPHTHALMIC at 13:07

## 2019-12-26 RX ADMIN — DORZOLAMIDE HYDROCHLORIDE 1 DROP(S): 20 SOLUTION/ DROPS OPHTHALMIC at 05:45

## 2019-12-26 NOTE — PROGRESS NOTE ADULT - ASSESSMENT
Patient is a 84y old Female w/ pmhx of pancreatic CA dx last month on chemotherapy (gemcitabine and abraxane), a.fib on eliquis, DM, HTN, DLD, and colon CA s/p resection who presents with a chief complaint of over 1 week duration of worsening, crampy, RUQ, 8/10, constant, RUQ and epigastric abd pain that radiated into her back with associated bilious vomiting, decreased PO intake, and chill, but no fevers. She is currently admitted to medicine with the primary diagnosis of Transaminitis 2/2 to obstruction by pancreatic mass. patient is s/p PTC drain and brush biopsy on 12/20 which showed negative due to insufficient specimen/tissue for malignancy. IR is discussing with GI as to do another biopsy. IR will let medicine team know as to when they will perform this biopsy again.    Pending/update: [place] biopsy, IR vs GI to perform biopsy    #Transaminitis 2/2 to obstruction  - pancreatic cancer diagnosis last month with elevated LFTs at outpt onc office  - LFTs improved   - Pain improvement following PTC on 12/20, biopsy result showed insufficient tissue/specimen. IR planning do another biopsy once they confirm with GI  - c/w Morphine PRN for pain  - PTC is still draining, IR following  - patient was very nauseous for last two days, but this morning patient nausea has improved    #Pancreatic cancer  - PICC line placed in left upper arm today  - s/p 1 cycle of chemo on 12/12  - patient has to follow outpatient on January 3 at [facility] for re-evaluation  - Heme  onc following    #Atrial fibrillation  - c/w cardizem 120mg TID PO  - c/w eliquis 5 mg bid  - hold eliquis dose if patient is planning to go for IR guided brush biopsy    #Hypertension  -c/w lisinopril, cardizem    #) Antidepressant  -c/w prozac    #DM   -Monitor FS and start on insulin if FS > 200    #DLD  -c/w atorvastatin    #diet: liquid  #DVT ppx: SCD   #GI ppx: none  #Dispo: brush biopsy > then d/c  #Code: full

## 2019-12-26 NOTE — PROGRESS NOTE ADULT - SUBJECTIVE AND OBJECTIVE BOX
LOCALLY ADVANCED PANCREATIC CANCER  SP 1 CYCLE OF CHEMO   FEELS LITTLE BETTER but still weak  STILL NOT EATING MUCH   has pain off and on

## 2019-12-26 NOTE — PROGRESS NOTE ADULT - SUBJECTIVE AND OBJECTIVE BOX
Interventional Radiology Follow- Up Note      84y Female s/p  internal-external PTC in Interventional Radiology with Dr Clay       O/N:     No complaints offered.    Vitals: T(F): 96.6 (12-26-19 @ 04:36), Max: 97.1 (12-25-19 @ 21:30)  HR: 87 (12-26-19 @ 04:36) (87 - 94)  BP: 114/57 (12-26-19 @ 04:36) (114/57 - 126/59)  RR: 18 (12-26-19 @ 04:36) (18 - 18)  SpO2: --  Wt(kg): --    LABS:                        11.7   4.96  )-----------( 241      ( 26 Dec 2019 06:53 )             33.9     12-26    132<L>  |  98  |  7<L>  ----------------------------<  150<H>  4.2   |  19  |  <0.5<L>    Ca    8.1<L>      26 Dec 2019 06:53    TPro  4.7<L>  /  Alb  2.6<L>  /  TBili  1.2  /  DBili  x   /  AST  19  /  ALT  23  /  AlkPhos  614<H>  12-26        PHYSICAL EXAM:  General: Nontoxic, in NAD  Neuro:  Alert & oriented x 3  CV: +S1+S2 regular rate and rhythm  Lung: clear to ausculation bilaterally, respirations nonlabored, good inspiratory effort  Abdomen: soft, NTND. Normactive BS  Extremities: no pedal edema or calf tenderness noted         Impression: 84y Female admitted with TRANSAMINITIS;INTRACTABLE ABDOMINAL PAIN;ATRIAL FIBRILLATION WITH RVR.  [place] biopsy was negative.  LFTs near normal now.      Plan:  -Keep internal-external catheter capped.  Place to drainage if signs of infection  - Consider advanced GI for endoscopic guided US biopsy, which will likely yield better diagnostic results.  I spoke with advanced GI team  -IF they are unable to perform the biopsy, then IR will repeat the brush biopsy (diagnostic yield not as high) tomorrow  -Once this mass biopsy comes back as malignant, we can place an internal stent and remove external catheter  -Ir is on board and will continue following     Please call Interventional Radiology [phone] with any questions, concerns, or issues regarding above.

## 2019-12-26 NOTE — PROGRESS NOTE ADULT - SUBJECTIVE AND OBJECTIVE BOX
ZHAO KUMAR 84y Female  MRN#: [id number]   CODE STATUS:________      SUBJECTIVE  Patient is a 84y old Female who presents with a chief complaint of Abdominal pain (26 Dec 2019 13:12)  Currently admitted to medicine with the primary diagnosis of Transaminitis      OBJECTIVE  PAST MEDICAL & SURGICAL HISTORY  Pancreatic cancer  Anxiety  Hypercholesteremia  Afib  DM (diabetes mellitus)  Acquired cataract  Abnormal serum cholesterol  Generalized OA  Mildly obese  H/O: HTN (hypertension)  History of tonsillectomy  History of cataract surgery  History of colon resection  History of cholecystectomy  H/O hernia repair: abdomen    ALLERGIES:  No Known Allergies    MEDICATIONS:  STANDING MEDICATIONS  apixaban 5 milliGRAM(s) Oral two times a day  atorvastatin 20 milliGRAM(s) Oral at bedtime  chlorhexidine 4% Liquid 1 Application(s) Topical <User Schedule>  diltiazem    Tablet 120 milliGRAM(s) Oral three times a day  dorzolamide 2% Ophthalmic Solution 1 Drop(s) Left EYE three times a day  FLUoxetine 20 milliGRAM(s) Oral daily  influenza   Vaccine 0.5 milliLiter(s) IntraMuscular once  latanoprost 0.005% Ophthalmic Solution 1 Drop(s) Right EYE at bedtime  lisinopril 40 milliGRAM(s) Oral daily  melatonin 3 milliGRAM(s) Oral at bedtime  ondansetron Injectable 4 milliGRAM(s) IV Push every 8 hours  senna 2 Tablet(s) Oral daily    PRN MEDICATIONS  ibuprofen  Tablet. 400 milliGRAM(s) Oral once PRN  morphine  - Injectable 2 milliGRAM(s) IV Push every 6 hours PRN  simethicone 80 milliGRAM(s) Chew two times a day PRN      VITAL SIGNS: Last 24 Hours  T(C): 35.8 (26 Dec 2019 13:13), Max: 36.2 (25 Dec 2019 21:30)  T(F): 96.5 (26 Dec 2019 13:13), Max: 97.1 (25 Dec 2019 21:30)  HR: 101 (26 Dec 2019 13:13) (87 - 101)  BP: 119/57 (26 Dec 2019 13:13) (114/57 - 119/58)  BP(mean): --  RR: 18 (26 Dec 2019 13:13) (18 - 18)  SpO2: --  PHYSICAL EXAM:  GENERAL: NAD, well-developed, AAOx3  HEENT:  Atraumatic, Normocephalic. EOMI, PERRLA,   PULMONARY: Clear   CARDIOVASCULAR: s1/s2  GASTROINTESTINAL: Soft, Nontender, Nondistended; Bowel sounds present, has ptc drain  MUSCULOSKELETAL:  2+ Peripheral Pulses,  LABS:                        11.7   4.96  )-----------( 241      ( 26 Dec 2019 06:53 )             33.9     12-26    132<L>  |  98  |  7<L>  ----------------------------<  150<H>  4.2   |  19  |  <0.5<L>    Ca    8.1<L>      26 Dec 2019 06:53    TPro  4.7<L>  /  Alb  2.6<L>  /  TBili  1.2  /  DBili  x   /  AST  19  /  ALT  23  /  AlkPhos  614<H>  12-26                  RADIOLOGY:

## 2019-12-27 ENCOUNTER — TRANSCRIPTION ENCOUNTER (OUTPATIENT)
Age: 84
End: 2019-12-27

## 2019-12-27 VITALS
DIASTOLIC BLOOD PRESSURE: 57 MMHG | HEART RATE: 86 BPM | RESPIRATION RATE: 17 BRPM | SYSTOLIC BLOOD PRESSURE: 106 MMHG | TEMPERATURE: 96 F

## 2019-12-27 LAB
ALBUMIN SERPL ELPH-MCNC: 2.6 G/DL — LOW (ref 3.5–5.2)
ALP SERPL-CCNC: 562 U/L — HIGH (ref 30–115)
ALT FLD-CCNC: 20 U/L — SIGNIFICANT CHANGE UP (ref 0–41)
ANION GAP SERPL CALC-SCNC: 12 MMOL/L — SIGNIFICANT CHANGE UP (ref 7–14)
AST SERPL-CCNC: 17 U/L — SIGNIFICANT CHANGE UP (ref 0–41)
BILIRUB SERPL-MCNC: 1.1 MG/DL — SIGNIFICANT CHANGE UP (ref 0.2–1.2)
BUN SERPL-MCNC: 7 MG/DL — LOW (ref 10–20)
CALCIUM SERPL-MCNC: 8.2 MG/DL — LOW (ref 8.5–10.1)
CHLORIDE SERPL-SCNC: 96 MMOL/L — LOW (ref 98–110)
CO2 SERPL-SCNC: 24 MMOL/L — SIGNIFICANT CHANGE UP (ref 17–32)
CREAT SERPL-MCNC: 0.5 MG/DL — LOW (ref 0.7–1.5)
GLUCOSE BLDC GLUCOMTR-MCNC: 100 MG/DL — HIGH (ref 70–99)
GLUCOSE SERPL-MCNC: 134 MG/DL — HIGH (ref 70–99)
HCT VFR BLD CALC: 36.2 % — LOW (ref 37–47)
HGB BLD-MCNC: 12.4 G/DL — SIGNIFICANT CHANGE UP (ref 12–16)
MCHC RBC-ENTMCNC: 33 PG — HIGH (ref 27–31)
MCHC RBC-ENTMCNC: 34.3 G/DL — SIGNIFICANT CHANGE UP (ref 32–37)
MCV RBC AUTO: 96.3 FL — SIGNIFICANT CHANGE UP (ref 81–99)
NRBC # BLD: 0 /100 WBCS — SIGNIFICANT CHANGE UP (ref 0–0)
PLATELET # BLD AUTO: 322 K/UL — SIGNIFICANT CHANGE UP (ref 130–400)
POTASSIUM SERPL-MCNC: 4.2 MMOL/L — SIGNIFICANT CHANGE UP (ref 3.5–5)
POTASSIUM SERPL-SCNC: 4.2 MMOL/L — SIGNIFICANT CHANGE UP (ref 3.5–5)
PROT SERPL-MCNC: 5 G/DL — LOW (ref 6–8)
RBC # BLD: 3.76 M/UL — LOW (ref 4.2–5.4)
RBC # FLD: 15.9 % — HIGH (ref 11.5–14.5)
SODIUM SERPL-SCNC: 132 MMOL/L — LOW (ref 135–146)
WBC # BLD: 5.6 K/UL — SIGNIFICANT CHANGE UP (ref 4.8–10.8)
WBC # FLD AUTO: 5.6 K/UL — SIGNIFICANT CHANGE UP (ref 4.8–10.8)

## 2019-12-27 PROCEDURE — 99239 HOSP IP/OBS DSCHRG MGMT >30: CPT

## 2019-12-27 RX ORDER — MORPHINE SULFATE 50 MG/1
2 CAPSULE, EXTENDED RELEASE ORAL EVERY 6 HOURS
Refills: 0 | Status: DISCONTINUED | OUTPATIENT
Start: 2019-12-27 | End: 2019-12-27

## 2019-12-27 RX ADMIN — LISINOPRIL 40 MILLIGRAM(S): 2.5 TABLET ORAL at 05:59

## 2019-12-27 RX ADMIN — APIXABAN 5 MILLIGRAM(S): 2.5 TABLET, FILM COATED ORAL at 05:59

## 2019-12-27 RX ADMIN — SENNA PLUS 2 TABLET(S): 8.6 TABLET ORAL at 11:50

## 2019-12-27 RX ADMIN — ONDANSETRON 4 MILLIGRAM(S): 8 TABLET, FILM COATED ORAL at 14:37

## 2019-12-27 RX ADMIN — DORZOLAMIDE HYDROCHLORIDE 1 DROP(S): 20 SOLUTION/ DROPS OPHTHALMIC at 06:01

## 2019-12-27 RX ADMIN — APIXABAN 5 MILLIGRAM(S): 2.5 TABLET, FILM COATED ORAL at 17:30

## 2019-12-27 RX ADMIN — Medication 120 MILLIGRAM(S): at 14:37

## 2019-12-27 RX ADMIN — Medication 120 MILLIGRAM(S): at 05:59

## 2019-12-27 RX ADMIN — Medication 20 MILLIGRAM(S): at 11:49

## 2019-12-27 RX ADMIN — Medication 400 MILLIGRAM(S): at 05:59

## 2019-12-27 RX ADMIN — DORZOLAMIDE HYDROCHLORIDE 1 DROP(S): 20 SOLUTION/ DROPS OPHTHALMIC at 14:37

## 2019-12-27 NOTE — DISCHARGE NOTE NURSING/CASE MANAGEMENT/SOCIAL WORK - NSDCPEELIQUIS_GEN_ALL_CORE
Apixaban/Eliquis - Potential for adverse drug reactions and interactions/Apixaban/Eliquis - Follow up monitoring/Apixaban/Eliquis - Dietary Advice/Apixaban/Eliquis - Compliance

## 2019-12-27 NOTE — CHART NOTE - NSCHARTNOTEFT_GEN_A_CORE
<<<RESIDENT DISCHARGE NOTE>>>     ZHAO KUMAR  MRN-[id number]    VITAL SIGNS:  T(F): 96.3 (12-27-19 @ 14:05), Max: 97.3 (12-26-19 @ 21:02)  HR: 86 (12-27-19 @ 14:05)  BP: 106/57 (12-27-19 @ 14:05)  SpO2: --      TEST RESULTS:                        12.4   5.60  )-----------( 322      ( 27 Dec 2019 06:11 )             36.2       12-27    132<L>  |  96<L>  |  7<L>  ----------------------------<  134<H>  4.2   |  24  |  0.5<L>    Ca    8.2<L>      27 Dec 2019 06:11    TPro  5.0<L>  /  Alb  2.6<L>  /  TBili  1.1  /  DBili  x   /  AST  17  /  ALT  20  /  AlkPhos  562<H>  12-27      FINAL DISCHARGE INTERVIEW:  Resident(s) Present: (Name: Lonnie Odonnell RN Present: (Name:  ___________)    DISCHARGE MEDICATION RECONCILIATION  reviewed with Attending (Name: Dr. Rosanne Mathis    DISPOSITION:   [  ] Home,    [  ] Home with Visiting Nursing Services,   [   x ]  SNF/ NH,    [   ] Acute Rehab (4A),   [   ] Other (Specify:_________)

## 2019-12-27 NOTE — PROGRESS NOTE ADULT - SUBJECTIVE AND OBJECTIVE BOX
ZHAO KUMAR  [facility] T2-3A 016 B ([facility] T2-3A)            Patient was evaluated and examined  by bedside, reports mild left sided ribcage area pain, tolerating diet well.            REVIEW OF SYSTEMS:  please see pertinent positives mentioned above, all other 12 ROS negative      T(C): , Max: 36.3 (12-26-19 @ 21:02)  HR: 99 (12-27-19 @ 06:20)  BP: 109/56 (12-27-19 @ 06:20)  RR: 17 (12-27-19 @ 06:20)  SpO2: --  CAPILLARY BLOOD GLUCOSE      POCT Blood Glucose.: 126 mg/dL (26 Dec 2019 21:15)  POCT Blood Glucose.: 125 mg/dL (26 Dec 2019 16:23)      PHYSICAL EXAM:  General: NAD, AAOX3, patient is laying comfortably in bed  HEENT: AT, NC, left under eye bruise, Supple, NO JVD, NO CB  Lungs: CTA B/L, no wheezing, no rhonchi  chest: left sided ribcage area pain.  CVS: normal S1, S2, RRR, NO M/G/R  Abdomen: soft, bowel sounds present, non-tender, mid abdominal hernia with palpable mass, non-distended, right upper quadrant billiary drain present.  Extremities: no edema, no clubbing, no cyanosis, positive peripheral pulses b/l  Neuro: no acute focal neurological deficits, generalized body weakness  Skin: as above.        LAB  CBC  Date: 12-27-19 @ 06:11  Mean cell Vuysvnwvrh92.0  Mean cell Hemoglobin Conc34.3  Mean cell Volum 96.3  Platelet count-Automate 322  RBC Count 3.76  Red Cell Distrib Width15.9  WBC Count5.60  % Albumin, Urine--  Hematocrit 36.2  Hemoglobin 12.4  CBC  Date: 12-26-19 @ 06:53  Mean cell Qgihobbecf51.5  Mean cell Hemoglobin Conc34.5  Mean cell Volum 94.2  Platelet count-Automate 241  RBC Count 3.60  Red Cell Distrib Width15.6  WBC Count4.96  % Albumin, Urine--  Hematocrit 33.9  Hemoglobin 11.7  CBC  Date: 12-25-19 @ 06:54  Mean cell Drmypxvtzb13.7  Mean cell Hemoglobin Conc34.7  Mean cell Volum 94.1  Platelet count-Automate 206  RBC Count 3.55  Red Cell Distrib Width15.7  WBC Count5.56  % Albumin, Urine--  Hematocrit 33.4  Hemoglobin 11.6  CBC  Date: 12-24-19 @ 06:23  Mean cell Wclptklbgc84.8  Mean cell Hemoglobin Conc36.0  Mean cell Volum 91.2  Platelet count-Automate 174  RBC Count 3.75  Red Cell Distrib Width15.2  WBC Count6.09  % Albumin, Urine--  Hematocrit 34.2  Hemoglobin 12.3  CBC  Date: 12-23-19 @ 05:36  Mean cell Bqtjskfmfn65.2  Mean cell Hemoglobin Conc34.7  Mean cell Volum 93.0  Platelet count-Automate 173  RBC Count 3.69  Red Cell Distrib Width15.0  WBC Count5.57  % Albumin, Urine--  Hematocrit 34.3  Hemoglobin 11.9  CBC  Date: 12-22-19 @ 06:30  Mean cell Wcsgjzkuwd35.8  Mean cell Hemoglobin Conc35.6  Mean cell Volum 92.0  Platelet count-Automate 143  RBC Count 3.48  Red Cell Distrib Width15.1  WBC Count4.74  % Albumin, Urine--  Hematocrit 32.0  Hemoglobin 11.4    [facility]  12-27-19 @ 06:11  Blood Gas Arterial-Calcium,Ionized--  Blood Urea Nitrogen, Serum 7 mg/dL<L> [10 - 20]  Carbon Dioxide, Serum24 mmol/L [17 - 32]  Chloride, Serum96 mmol/L<L> [98 - 110]  Creatinie, Serum0.5 mg/dL<L> [0.7 - 1.5]  Glucose, Oepcm035 mg/dL<H> [70 - 99]  Potassium, Serum4.2 mmol/L [3.5 - 5.0]  Sodium, Serum 132 mmol/L<L> [135 - 146]  [facility]  12-26-19 @ 06:53  Blood Gas Arterial-Calcium,Ionized--  Blood Urea Nitrogen, Serum 7 mg/dL<L> [10 - 20]  Carbon Dioxide, Serum19 mmol/L [17 - 32]  Chloride, Serum98 mmol/L [98 - 110]  Creatinie, Serum<0.5 mg/dL<L> [0.7 - 1.5]  Glucose, Emrvw667 mg/dL<H> [70 - 99]  Potassium, Serum4.2 mmol/L [3.5 - 5.0]  Sodium, Serum 132 mmol/L<L> [135 - 146]  [facility]  12-25-19 @ 06:54  Blood Gas Arterial-Calcium,Ionized--  Blood Urea Nitrogen, Serum 6 mg/dL<L> [10 - 20]  Carbon Dioxide, Serum19 mmol/L [17 - 32]  Chloride, Serum95 mmol/L<L> [98 - 110]  Creatinie, Serum<0.5 mg/dL<L> [0.7 - 1.5]  Glucose, Gwtvr479 mg/dL<H> [70 - 99]  Potassium, Serum4.5 mmol/L [3.5 - 5.0]  Sodium, Serum 129 mmol/L<L> [135 - 146]  [facility]  12-24-19 @ 06:23  Blood Gas Arterial-Calcium,Ionized--  Blood Urea Nitrogen, Serum 4 mg/dL<L> [10 - 20]  Carbon Dioxide, Serum21 mmol/L [17 - 32]  Chloride, Serum95 mmol/L<L> [98 - 110]  Creatinie, Serum<0.5 mg/dL<L> [0.7 - 1.5]  Glucose, Mpjsl100 mg/dL<H> [70 - 99]  Potassium, Serum4.6 mmol/L [3.5 - 5.0]  Sodium, Serum 128 mmol/L<L> [135 - 146]  [facility]  12-23-19 @ 05:36  Blood Gas Arterial-Calcium,Ionized--  Blood Urea Nitrogen, Serum 4 mg/dL<L> [10 - 20]  Carbon Dioxide, Serum20 mmol/L [17 - 32]  Chloride, Serum95 mmol/L<L> [98 - 110]  Creatinie, Serum<0.5 mg/dL<L> [0.7 - 1.5]  Glucose, Vaivn518 mg/dL<H> [70 - 99]  Potassium, Serum3.1 mmol/L<L> [3.5 - 5.0]  Sodium, Serum 130 mmol/L<L> [135 - 146]              Microbiology:    Culture - Urine (collected 12-22-19 @ 23:04)  Source: .Urine Catheterized  Final Report (12-24-19 @ 07:00):    No growth    Culture - Acid Fast (collected 12-20-19 @ 10:00)    Culture - Fungal, Body Fluid (collected 12-20-19 @ 10:00)  Source: Bile None  Preliminary Report (12-23-19 @ 08:58):    Testing in progress    Culture - Body Fluid with Gram Stain (collected 12-20-19 @ 10:00)  Source: Bile None  Gram Stain (12-20-19 @ 22:26):    No polymorphonuclear cells seen per low power field    No organisms seen per oil power field  Final Report (12-26-19 @ 00:04):    No growth at 5 days    Culture - Blood (collected 12-20-19 @ 04:30)  Source: .Blood Blood  Final Report (12-25-19 @ 14:00):    No growth at 5 days.    Culture - Blood (collected 12-19-19 @ 05:48)  Source: .Blood None  Final Report (12-24-19 @ 18:00):    No growth at 5 days.        Medications:  apixaban 5 milliGRAM(s) Oral two times a day  atorvastatin 20 milliGRAM(s) Oral at bedtime  chlorhexidine 4% Liquid 1 Application(s) Topical <User Schedule>  diltiazem    Tablet 120 milliGRAM(s) Oral three times a day  dorzolamide 2% Ophthalmic Solution 1 Drop(s) Left EYE three times a day  FLUoxetine 20 milliGRAM(s) Oral daily  influenza   Vaccine 0.5 milliLiter(s) IntraMuscular once  latanoprost 0.005% Ophthalmic Solution 1 Drop(s) Right EYE at bedtime  lisinopril 40 milliGRAM(s) Oral daily  melatonin 3 milliGRAM(s) Oral at bedtime  morphine  - Injectable 2 milliGRAM(s) IV Push every 6 hours PRN  ondansetron Injectable 4 milliGRAM(s) IV Push every 8 hours  senna 2 Tablet(s) Oral daily  simethicone 80 milliGRAM(s) Chew two times a day PRN        Assessment and Plan:  84y old Female w/ pmhx of pancreatic CA dx last month on chemotherapy (gemcitabine and abraxane), a.fib on eliquis, DM, HTN, DLD, and colon CA s/p resection who presents 1 week duration of worsening, crampy, RUQ pain  with associated bilious vomiting, decreased PO intake, and chill, but no fevers. She was admitted to medicine with the primary diagnosis of Transaminitis 2/2 to obstruction by pancreatic mass. patient is s/p Placement of an 8F external-internal biliary drain on 12/20/19 by IR    #Transaminitis 2/2 to obstruction  - pancreatic cancer with elevated LFTs at outpt onc office  -Patient is s/p PTC w/ IR  biliary catheter placement, has external drain and s/p brush biopsy on 12/20/19. clinically has improved.  - LFTs improving.   -advanced diet as tolerated   - patient anticipated to f/up with IR post d/c to SNF in 2 weeks.           -CT abdomen showed 10.6 x 8.4 cm mass involving the 10.6 x 8.4 cm pancreatic head and body w/marked intrahepatic bile duct dilatation and dilatation of the common hepatic duct down to the level of the pancreatic mass    #) Acute urinary retention post d/c moreno, due to multiple episodes of urinary retention- reinserted moreno. patient to f/up with  post d/c to SNF      #) Suspected cholangitis in pt with pancreatic CA vs tumor related biliary obstruction   - SIRS criteria met on admission. No infection found. so NOT SEPSIS.  - Currently afebrile, vitals wnl  - blood cultures NGTD.   Per ID, ok to d/c abx.       #Atrial fibrillation  -c/w cardizem   -restarted on Eliquis    #Pancreatic cancer  -On gemcitabine and Abraxane  -s/p 1 cycle of chemo on 12/12  -Hem onc following,  Got PICC for chemo on 12/23.       #Hypertension  -c/w lisinopril, cardizem    #DM   -Monitor FS and start on insulin if FS > 200    #DLD  -c/w atorvastatin    Hypokalemia Depleted monitor.      #Progress Note Handoff: d/c to SNF today.  Family discussion: yes Disposition: SNF today

## 2019-12-27 NOTE — PROGRESS NOTE ADULT - SUBJECTIVE AND OBJECTIVE BOX
PT WITH LOCALLY ADVANCED PANCREATIC CANCER   sp one cycle of chemo gem  abraxane   then had billiary obstruction by tumor anfd abnormal LFTS    HAD PERCUTANEOUS DRAIN DONE BY IR   lfts improving   off and on abdominal pain controlled with meds

## 2019-12-27 NOTE — PROGRESS NOTE ADULT - SUBJECTIVE AND OBJECTIVE BOX
PT seen and examined with daughter present.  PT has an internal-external biliary drain for distal CBD malignant obstruction.  LFTs near normal now.  Given positive biopsy at outside hospital and subsequent chemotherapy, no need for repeat brush biopsy.  Plans for SNF.  IR will schedule outpatient procedure in 1-2weeks.  Plan discussed with SUYAPA Sims and patient and her family.

## 2019-12-27 NOTE — PROGRESS NOTE ADULT - REASON FOR ADMISSION
Abdominal pain

## 2019-12-27 NOTE — PROGRESS NOTE ADULT - ASSESSMENT
labs stable   LFTS improving   will fo to a REHAB PLACE   will be followed by oncology in the office

## 2019-12-27 NOTE — DISCHARGE NOTE NURSING/CASE MANAGEMENT/SOCIAL WORK - PATIENT PORTAL LINK FT
You can access the FollowMyHealth Patient Portal offered by [facility] by registering at the following website: [email or web address]. By joining [facility]’s FollowMyHealth portal, you will also be able to view your health information using other applications (apps) compatible with our system.

## 2019-12-27 NOTE — PROGRESS NOTE ADULT - ASSESSMENT
Patient is a 84y old Female w/ pmhx of pancreatic CA dx last month on chemotherapy (gemcitabine and abraxane), a.fib on eliquis, DM, HTN, DLD, and colon CA s/p resection who presents with a chief complaint of over 1 week duration of worsening, crampy, RUQ, 8/10, constant, RUQ and epigastric abd pain that radiated into her back with associated bilious vomiting, decreased PO intake, and chill, but no fevers. She is currently admitted to medicine with the primary diagnosis of Transaminitis 2/2 to obstruction by pancreatic mass. patient is s/p PTC drain and brush biopsy on 12/20 which showed negative due to insufficient specimen/tissue for malignancy. IR is discussing with GI as to do another biopsy. IR will let medicine team know as to when they will perform this biopsy again.    Pending/update: [place] biopsy, IR vs GI to perform biopsy    #Transaminitis 2/2 to obstruction  - pancreatic cancer diagnosis last month with elevated LFTs at outpt onc office  - LFTs improved   - Pain improvement following PTC on 12/20, biopsy result showed insufficient tissue/specimen. IR planning do another biopsy once they confirm with GI  - c/w Morphine PRN for pain  - PTC is still draining, IR following  - patient was very nauseous for last two days, but this morning patient nausea has improved    #Pancreatic cancer  - PICC line placed in left upper arm today  - s/p 1 cycle of chemo on 12/12  - patient has to follow outpatient on January 3 at [facility] for re-evaluation  - Heme  onc following    #Atrial fibrillation  - c/w cardizem 120mg TID PO  - c/w eliquis 5 mg bid  - hold eliquis dose if patient is planning to go for IR guided brush biopsy    #Hypertension  -c/w lisinopril, cardizem    #) Antidepressant  -c/w prozac    #DM   -Monitor FS and start on insulin if FS > 200    #DLD  -c/w atorvastatin    #diet: liquid  #DVT ppx: SCD   #GI ppx: none  #Dispo: brush biopsy > then d/c  #Code: full Patient is a 84y old Female w/ pmhx of pancreatic CA dx last month on chemotherapy (gemcitabine and abraxane), a.fib on eliquis, DM, HTN, DLD, and colon CA s/p resection who presents with a chief complaint of over 1 week duration of worsening, crampy, RUQ, 8/10, constant, RUQ and epigastric abd pain that radiated into her back with associated bilious vomiting, decreased PO intake, and chill, but no fevers. She is currently admitted to medicine with the primary diagnosis of Transaminitis 2/2 to obstruction by pancreatic mass. patient is s/p PTC drain and brush biopsy on 12/20 which showed negative due to insufficient specimen/tissue for malignancy. IR is discussing with GI as to do another biopsy. IR will let medicine team know as to when they will perform this biopsy again.    Pending/Update: IR will do internalization procedure outpatient. We can discharge the patient once nausea improves and has good po intake    #Transaminitis 2/2 to obstruction  - pancreatic cancer diagnosis last month with elevated LFTs at outpt onc office  - LFTs improved   - Pain improvement following PTC on 12/20, biopsy result showed insufficient tissue/specimen. IR planning do another biopsy once they confirm with GI  - c/w Morphine PRN for pain  - PTC is still draining, IR following  - patient was very nauseous for last two days, but this morning patient nausea has improved\    #Pancreatic cancer  - PICC line placed in left upper arm today  - s/p 1 cycle of chemo on 12/12  - patient has to follow outpatient on January 3 at [facility] for re-evaluation  - Heme  onc following    #Atrial fibrillation  - c/w cardizem 120mg TID PO  - c/w eliquis 5 mg bid  - hold eliquis dose if patient is planning to go for IR guided brush biopsy    #Hypertension  -c/w lisinopril, cardizem    #) Antidepressant  -c/w prozac    #DM   -Monitor FS and start on insulin if FS > 200    #DLD  -c/w atorvastatin    #diet: mechanical soft  #DVT ppx: SCD   #GI ppx: none  #Dispo: IR will do internalization procedure outpatient. we can discharge the patient once nausea improves and has good po intake  #Code: full

## 2019-12-27 NOTE — PROGRESS NOTE ADULT - SUBJECTIVE AND OBJECTIVE BOX
ZHAO KUMAR 84y Female  MRN#: [id number]   CODE STATUS:      SUBJECTIVE  Patient is a 84y old Female who presents with a chief complaint of Abdominal pain (26 Dec 2019 13:12)  Currently admitted to medicine with the primary diagnosis of Transaminitis      OBJECTIVE  PAST MEDICAL & SURGICAL HISTORY  Pancreatic cancer  Anxiety  Hypercholesteremia  Afib  DM (diabetes mellitus)  Acquired cataract  Abnormal serum cholesterol  Generalized OA  Mildly obese  H/O: HTN (hypertension)  History of tonsillectomy  History of cataract surgery  History of colon resection  History of cholecystectomy  H/O hernia repair: abdomen    ALLERGIES:  No Known Allergies    MEDICATIONS:  STANDING MEDICATIONS  apixaban 5 milliGRAM(s) Oral two times a day  atorvastatin 20 milliGRAM(s) Oral at bedtime  chlorhexidine 4% Liquid 1 Application(s) Topical <User Schedule>  diltiazem    Tablet 120 milliGRAM(s) Oral three times a day  dorzolamide 2% Ophthalmic Solution 1 Drop(s) Left EYE three times a day  FLUoxetine 20 milliGRAM(s) Oral daily  influenza   Vaccine 0.5 milliLiter(s) IntraMuscular once  latanoprost 0.005% Ophthalmic Solution 1 Drop(s) Right EYE at bedtime  lisinopril 40 milliGRAM(s) Oral daily  melatonin 3 milliGRAM(s) Oral at bedtime  ondansetron Injectable 4 milliGRAM(s) IV Push every 8 hours  senna 2 Tablet(s) Oral daily    PRN MEDICATIONS  ibuprofen  Tablet. 400 milliGRAM(s) Oral once PRN  morphine  - Injectable 2 milliGRAM(s) IV Push every 6 hours PRN  simethicone 80 milliGRAM(s) Chew two times a day PRN      VITAL SIGNS: Last 24 Hours  T(C): 35.8 (26 Dec 2019 13:13), Max: 36.2 (25 Dec 2019 21:30)  T(F): 96.5 (26 Dec 2019 13:13), Max: 97.1 (25 Dec 2019 21:30)  HR: 101 (26 Dec 2019 13:13) (87 - 101)  BP: 119/57 (26 Dec 2019 13:13) (114/57 - 119/58)  BP(mean): --  RR: 18 (26 Dec 2019 13:13) (18 - 18)  SpO2: --  PHYSICAL EXAM:  GENERAL: NAD, well-developed, AAOx3  HEENT:  Atraumatic, Normocephalic. EOMI, PERRLA,   PULMONARY: Clear   CARDIOVASCULAR: s1/s2  GASTROINTESTINAL: Soft, Nontender, Nondistended; Bowel sounds present, has ptc drain  MUSCULOSKELETAL:  2+ Peripheral Pulses,  LABS:                        11.7   4.96  )-----------( 241      ( 26 Dec 2019 06:53 )             33.9     12-26    132<L>  |  98  |  7<L>  ----------------------------<  150<H>  4.2   |  19  |  <0.5<L>    Ca    8.1<L>      26 Dec 2019 06:53    TPro  4.7<L>  /  Alb  2.6<L>  /  TBili  1.2  /  DBili  x   /  AST  19  /  ALT  23  /  AlkPhos  614<H>  12-26                  RADIOLOGY: ZHAO KUMAR 84y Female  MRN#: [id number]   CODE STATUS:      SUBJECTIVE  Patient is a 84y old Female who presents with a chief complaint of Abdominal pain (26 Dec 2019 13:12)  Currently admitted to medicine with the primary diagnosis of Transaminitis      OBJECTIVE  PAST MEDICAL & SURGICAL HISTORY  Pancreatic cancer  Anxiety  Hypercholesteremia  Afib  DM (diabetes mellitus)  Acquired cataract  Abnormal serum cholesterol  Generalized OA  Mildly obese  H/O: HTN (hypertension)  History of tonsillectomy  History of cataract surgery  History of colon resection  History of cholecystectomy  H/O hernia repair: abdomen    ALLERGIES:  No Known Allergies    MEDICATIONS:  STANDING MEDICATIONS  apixaban 5 milliGRAM(s) Oral two times a day  atorvastatin 20 milliGRAM(s) Oral at bedtime  chlorhexidine 4% Liquid 1 Application(s) Topical <User Schedule>  diltiazem    Tablet 120 milliGRAM(s) Oral three times a day  dorzolamide 2% Ophthalmic Solution 1 Drop(s) Left EYE three times a day  FLUoxetine 20 milliGRAM(s) Oral daily  influenza   Vaccine 0.5 milliLiter(s) IntraMuscular once  latanoprost 0.005% Ophthalmic Solution 1 Drop(s) Right EYE at bedtime  lisinopril 40 milliGRAM(s) Oral daily  melatonin 3 milliGRAM(s) Oral at bedtime  ondansetron Injectable 4 milliGRAM(s) IV Push every 8 hours  senna 2 Tablet(s) Oral daily    PRN MEDICATIONS  ibuprofen  Tablet. 400 milliGRAM(s) Oral once PRN  morphine  - Injectable 2 milliGRAM(s) IV Push every 6 hours PRN  simethicone 80 milliGRAM(s) Chew two times a day PRN      VITAL SIGNS: Last 24 Hours  Vital Signs Last 24 Hrs  T(C): 35.9 (27 Dec 2019 06:20), Max: 36.3 (26 Dec 2019 21:02)  T(F): 96.7 (27 Dec 2019 06:20), Max: 97.3 (26 Dec 2019 21:02)  HR: 99 (27 Dec 2019 06:20) (87 - 101)  BP: 109/56 (27 Dec 2019 06:20) (109/56 - 119/57)  BP(mean): --  RR: 17 (27 Dec 2019 06:20) (17 - 18)  SpO2: --  PHYSICAL EXAM:  GENERAL: NAD, well-developed, AAOx3  HEENT:  Atraumatic, Normocephalic. EOMI, PERRLA,   PULMONARY: Clear   CARDIOVASCULAR: s1/s2  GASTROINTESTINAL: Soft, Nontender, Nondistended; Bowel sounds present, has ptc drain  MUSCULOSKELETAL:  2+ Peripheral Pulses,  LABS:                          12.4   5.60  )-----------( 322      ( 27 Dec 2019 06:11 )             36.2                           11.7   4.96  )-----------( 241      ( 26 Dec 2019 06:53 )             33.9       12-27    132<L>  |  96<L>  |  7<L>  ----------------------------<  134<H>  4.2   |  24  |  0.5<L>    Ca    8.2<L>      27 Dec 2019 06:11    TPro  5.0<L>  /  Alb  2.6<L>  /  TBili  1.1  /  DBili  x   /  AST  17  /  ALT  20  /  AlkPhos  562<H>  12-27 12-26    132<L>  |  98  |  7<L>  ----------------------------<  150<H>  4.2   |  19  |  <0.5<L>    Ca    8.1<L>      26 Dec 2019 06:53    TPro  4.7<L>  /  Alb  2.6<L>  /  TBili  1.2  /  DBili  x   /  AST  19  /  ALT  23  /  AlkPhos  614<H>  12-26                  RADIOLOGY:

## 2019-12-28 ENCOUNTER — OUTPATIENT (OUTPATIENT)
Dept: OUTPATIENT SERVICES | Facility: HOSPITAL | Age: 84
LOS: 1 days | Discharge: HOME | End: 2019-12-28

## 2019-12-28 DIAGNOSIS — Z98.49 CATARACT EXTRACTION STATUS, UNSPECIFIED EYE: Chronic | ICD-10-CM

## 2019-12-28 DIAGNOSIS — Z98.890 OTHER SPECIFIED POSTPROCEDURAL STATES: Chronic | ICD-10-CM

## 2019-12-28 DIAGNOSIS — Z90.49 ACQUIRED ABSENCE OF OTHER SPECIFIED PARTS OF DIGESTIVE TRACT: Chronic | ICD-10-CM

## 2019-12-28 DIAGNOSIS — R79.9 ABNORMAL FINDING OF BLOOD CHEMISTRY, UNSPECIFIED: ICD-10-CM

## 2019-12-28 DIAGNOSIS — Z90.89 ACQUIRED ABSENCE OF OTHER ORGANS: Chronic | ICD-10-CM

## 2019-12-28 PROBLEM — C25.9 MALIGNANT NEOPLASM OF PANCREAS, UNSPECIFIED: Chronic | Status: ACTIVE | Noted: 2019-12-18

## 2020-01-05 DIAGNOSIS — R74.0 NONSPECIFIC ELEVATION OF LEVELS OF TRANSAMINASE AND LACTIC ACID DEHYDROGENASE [LDH]: ICD-10-CM

## 2020-01-05 DIAGNOSIS — Z85.038 PERSONAL HISTORY OF OTHER MALIGNANT NEOPLASM OF LARGE INTESTINE: ICD-10-CM

## 2020-01-05 DIAGNOSIS — Z87.891 PERSONAL HISTORY OF NICOTINE DEPENDENCE: ICD-10-CM

## 2020-01-05 DIAGNOSIS — Z90.49 ACQUIRED ABSENCE OF OTHER SPECIFIED PARTS OF DIGESTIVE TRACT: ICD-10-CM

## 2020-01-05 DIAGNOSIS — E46 UNSPECIFIED PROTEIN-CALORIE MALNUTRITION: ICD-10-CM

## 2020-01-05 DIAGNOSIS — Z79.01 LONG TERM (CURRENT) USE OF ANTICOAGULANTS: ICD-10-CM

## 2020-01-05 DIAGNOSIS — E87.6 HYPOKALEMIA: ICD-10-CM

## 2020-01-05 DIAGNOSIS — R65.10 SYSTEMIC INFLAMMATORY RESPONSE SYNDROME (SIRS) OF NON-INFECTIOUS ORIGIN WITHOUT ACUTE ORGAN DYSFUNCTION: ICD-10-CM

## 2020-01-05 DIAGNOSIS — K83.09 OTHER CHOLANGITIS: ICD-10-CM

## 2020-01-05 DIAGNOSIS — Z79.84 LONG TERM (CURRENT) USE OF ORAL HYPOGLYCEMIC DRUGS: ICD-10-CM

## 2020-01-05 DIAGNOSIS — E11.9 TYPE 2 DIABETES MELLITUS WITHOUT COMPLICATIONS: ICD-10-CM

## 2020-01-05 DIAGNOSIS — K83.1 OBSTRUCTION OF BILE DUCT: ICD-10-CM

## 2020-01-05 DIAGNOSIS — I48.91 UNSPECIFIED ATRIAL FIBRILLATION: ICD-10-CM

## 2020-01-05 DIAGNOSIS — I10 ESSENTIAL (PRIMARY) HYPERTENSION: ICD-10-CM

## 2020-01-05 DIAGNOSIS — E78.5 HYPERLIPIDEMIA, UNSPECIFIED: ICD-10-CM

## 2020-01-05 DIAGNOSIS — R33.9 RETENTION OF URINE, UNSPECIFIED: ICD-10-CM

## 2020-03-09 NOTE — ASU PATIENT PROFILE, ADULT - TEACHING/LEARNING OTHER LEARNERS
March 9, 2020      oLri Cool MD  [address] [place] [address]           [facility]  [address]  [place] [address]  Phone: [phone]  Fax: [phone]          Patient: Josseline Degroot   MR Number: [id number]   YOB: 1956   Date of Visit: 3/9/2020       Dear Dr. Lori Cool:    Thank you for referring Josseline Degroot to me for evaluation. Attached you will find relevant portions of my assessment and plan of care.    If you have questions, please do not hesitate to call me. I look forward to following Josseline Degroot along with you.    Sincerely,    Hao Roger MD    Enclosure  CC:  No Recipients    If you would like to receive this communication electronically, please contact [email or web address] or [phone] to request more information on [facility] Link access.    For providers and/or their staff who would like to refer a patient to [facility], please contact us through our one-stop-shop provider referral line, [facility], at [phone].    If you feel you have received this communication in error or would no longer like to receive these types of communications, please e-mail [email or web address]         
DAUGHTER

## 2022-01-01 NOTE — PROGRESS NOTE ADULT - SUBJECTIVE AND OBJECTIVE BOX
Problem: PAIN -   Goal: Displays adequate comfort level or baseline comfort level  Description: INTERVENTIONS:  - Perform pain scoring using age-appropriate tool with hands-on care as needed    Notify physician/AP of high pain scores not responsive to comfort measures  - Administer analgesics based on type and severity of pain and evaluate response  - Sucrose analgesia per protocol for brief minor painful procedures  - Teach parents interventions for comforting infant  Outcome: Adequate for Discharge     Problem: SAFETY -   Goal: Patient will remain free from falls  Description: INTERVENTIONS:  - Instruct family/caregiver on patient safety  - Keep incubator doors and portholes closed when unattended  - Keep radiant warmer side rails and crib rails up when unattended  - Based on caregiver fall risk screen, instruct family/caregiver to ask for assistance with transferring infant if caregiver noted to have fall risk factors  Outcome: Adequate for Discharge     Problem: DISCHARGE PLANNING  Goal: Discharge to home or other facility with appropriate resources  Description: INTERVENTIONS:  - Identify barriers to discharge w/patient and caregiver  - Arrange for needed discharge resources and transportation as appropriate  - Identify discharge learning needs (meds, wound care, etc )  - Arrange for interpretive services to assist at discharge as needed  - Refer to Case Management Department for coordinating discharge planning if the patient needs post-hospital services based on physician/advanced practitioner order or complex needs related to functional status, cognitive ability, or social support system  Outcome: Adequate for Discharge ZHAO KUMAR  [facility] T2-3A 016 B ([facility] T2-3A)            Patient was evaluated and examined  by bedside, no new complains                REVIEW OF SYSTEMS:  please see pertinent positives mentioned above, all other 12 ROS negative      T(C): , Max: 36.2 (12-25-19 @ 21:30)  HR: 87 (12-26-19 @ 04:36)  BP: 114/57 (12-26-19 @ 04:36)  RR: 18 (12-26-19 @ 04:36)  SpO2: --  CAPILLARY BLOOD GLUCOSE      POCT Blood Glucose.: 142 mg/dL (26 Dec 2019 11:09)  POCT Blood Glucose.: 138 mg/dL (26 Dec 2019 07:54)  POCT Blood Glucose.: 118 mg/dL (25 Dec 2019 21:51)  POCT Blood Glucose.: 134 mg/dL (25 Dec 2019 17:01)      PHYSICAL EXAM:  General: NAD, AAOX3, patient is laying comfortably in bed  HEENT: AT, NC, left under eye bruise, Supple, NO JVD, NO CB  Lungs: CTA B/L, no wheezing, no rhonchi  chest: left sided ribcage area pain.  CVS: normal S1, S2, RRR, NO M/G/R  Abdomen: soft, bowel sounds present, non-tender, mid abdominal hernia with palpable mass, non-distended, right upper quadrant billiary drain present.  Extremities: no edema, no clubbing, no cyanosis, positive peripheral pulses b/l  Neuro: no acute focal neurological deficits, generalized body weakness  Skin: as above.        LAB  CBC  Date: 12-26-19 @ 06:53  Mean cell Qehadrvbpw30.5  Mean cell Hemoglobin Conc34.5  Mean cell Volum 94.2  Platelet count-Automate 241  RBC Count 3.60  Red Cell Distrib Width15.6  WBC Count4.96  % Albumin, Urine--  Hematocrit 33.9  Hemoglobin 11.7  CBC  Date: 12-25-19 @ 06:54  Mean cell Jtpssavpzb11.7  Mean cell Hemoglobin Conc34.7  Mean cell Volum 94.1  Platelet count-Automate 206  RBC Count 3.55  Red Cell Distrib Width15.7  WBC Count5.56  % Albumin, Urine--  Hematocrit 33.4  Hemoglobin 11.6  CBC  Date: 12-24-19 @ 06:23  Mean cell Xtmkmzbqbu72.8  Mean cell Hemoglobin Conc36.0  Mean cell Volum 91.2  Platelet count-Automate 174  RBC Count 3.75  Red Cell Distrib Width15.2  WBC Count6.09  % Albumin, Urine--  Hematocrit 34.2  Hemoglobin 12.3  CBC  Date: 12-23-19 @ 05:36  Mean cell Mcvxsjqbuq90.2  Mean cell Hemoglobin Conc34.7  Mean cell Volum 93.0  Platelet count-Automate 173  RBC Count 3.69  Red Cell Distrib Width15.0  WBC Count5.57  % Albumin, Urine--  Hematocrit 34.3  Hemoglobin 11.9  CBC  Date: 12-22-19 @ 06:30  Mean cell Nyupqbcvfc16.8  Mean cell Hemoglobin Conc35.6  Mean cell Volum 92.0  Platelet count-Automate 143  RBC Count 3.48  Red Cell Distrib Width15.1  WBC Count4.74  % Albumin, Urine--  Hematocrit 32.0  Hemoglobin 11.4  CBC  Date: 12-20-19 @ 04:30  Mean cell Fdejngqrmk43.3  Mean cell Hemoglobin Conc34.6  Mean cell Volum 93.4  Platelet count-Automate 153  RBC Count 3.47  Red Cell Distrib Width15.2  WBC Count4.80  % Albumin, Urine--  Hematocrit 32.4  Hemoglobin 11.2    [facility]  12-26-19 @ 06:53  Blood Gas Arterial-Calcium,Ionized--  Blood Urea Nitrogen, Serum 7 mg/dL<L> [10 - 20]  Carbon Dioxide, Serum19 mmol/L [17 - 32]  Chloride, Serum98 mmol/L [98 - 110]  Creatinie, Serum<0.5 mg/dL<L> [0.7 - 1.5]  Glucose, Fttlt596 mg/dL<H> [70 - 99]  Potassium, Serum4.2 mmol/L [3.5 - 5.0]  Sodium, Serum 132 mmol/L<L> [135 - 146]  [facility]  12-25-19 @ 06:54  Blood Gas Arterial-Calcium,Ionized--  Blood Urea Nitrogen, Serum 6 mg/dL<L> [10 - 20]  Carbon Dioxide, Serum19 mmol/L [17 - 32]  Chloride, Serum95 mmol/L<L> [98 - 110]  Creatinie, Serum<0.5 mg/dL<L> [0.7 - 1.5]  Glucose, Emxuk005 mg/dL<H> [70 - 99]  Potassium, Serum4.5 mmol/L [3.5 - 5.0]  Sodium, Serum 129 mmol/L<L> [135 - 146]  [facility]  12-24-19 @ 06:23  Blood Gas Arterial-Calcium,Ionized--  Blood Urea Nitrogen, Serum 4 mg/dL<L> [10 - 20]  Carbon Dioxide, Serum21 mmol/L [17 - 32]  Chloride, Serum95 mmol/L<L> [98 - 110]  Creatinie, Serum<0.5 mg/dL<L> [0.7 - 1.5]  Glucose, Tjxrq673 mg/dL<H> [70 - 99]  Potassium, Serum4.6 mmol/L [3.5 - 5.0]  Sodium, Serum 128 mmol/L<L> [135 - 146]  [facility]  12-23-19 @ 05:36  Blood Gas Arterial-Calcium,Ionized--  Blood Urea Nitrogen, Serum 4 mg/dL<L> [10 - 20]  Carbon Dioxide, Serum20 mmol/L [17 - 32]  Chloride, Serum95 mmol/L<L> [98 - 110]  Creatinie, Serum<0.5 mg/dL<L> [0.7 - 1.5]  Glucose, Jsgoj341 mg/dL<H> [70 - 99]  Potassium, Serum3.1 mmol/L<L> [3.5 - 5.0]  Sodium, Serum 130 mmol/L<L> [135 - 146]  [facility]  12-22-19 @ 06:30  Blood Gas Arterial-Calcium,Ionized--  Blood Urea Nitrogen, Serum 5 mg/dL<L> [10 - 20]  Carbon Dioxide, Serum20 mmol/L [17 - 32]  Chloride, Serum95 mmol/L<L> [98 - 110]  Creatinie, Serum<0.5 mg/dL<L> [0.7 - 1.5]  Glucose, Humpf612 mg/dL<H> [70 - 99]  Potassium, Serum2.8 mmol/L<L> [3.5 - 5.0]  Sodium, Serum 130 mmol/L<L> [135 - 146]              Microbiology:    Culture - Urine (collected 12-22-19 @ 23:04)  Source: .Urine Catheterized  Final Report (12-24-19 @ 07:00):    No growth    Culture - Acid Fast (collected 12-20-19 @ 10:00)    Culture - Fungal, Body Fluid (collected 12-20-19 @ 10:00)  Source: Bile None  Preliminary Report (12-23-19 @ 08:58):    Testing in progress    Culture - Body Fluid with Gram Stain (collected 12-20-19 @ 10:00)  Source: Bile None  Gram Stain (12-20-19 @ 22:26):    No polymorphonuclear cells seen per low power field    No organisms seen per oil power field  Final Report (12-26-19 @ 00:04):    No growth at 5 days    Culture - Blood (collected 12-20-19 @ 04:30)  Source: .Blood Blood  Final Report (12-25-19 @ 14:00):    No growth at 5 days.    Culture - Blood (collected 12-19-19 @ 05:48)  Source: .Blood None  Final Report (12-24-19 @ 18:00):    No growth at 5 days.          Medications:  apixaban 5 milliGRAM(s) Oral two times a day  atorvastatin 20 milliGRAM(s) Oral at bedtime  chlorhexidine 4% Liquid 1 Application(s) Topical <User Schedule>  diltiazem    Tablet 120 milliGRAM(s) Oral three times a day  dorzolamide 2% Ophthalmic Solution 1 Drop(s) Left EYE three times a day  FLUoxetine 20 milliGRAM(s) Oral daily  ibuprofen  Tablet. 400 milliGRAM(s) Oral once PRN  influenza   Vaccine 0.5 milliLiter(s) IntraMuscular once  latanoprost 0.005% Ophthalmic Solution 1 Drop(s) Right EYE at bedtime  lisinopril 40 milliGRAM(s) Oral daily  melatonin 3 milliGRAM(s) Oral at bedtime  morphine  - Injectable 2 milliGRAM(s) IV Push every 6 hours PRN  ondansetron Injectable 4 milliGRAM(s) IV Push every 8 hours  senna 2 Tablet(s) Oral daily  simethicone 80 milliGRAM(s) Chew two times a day PRN        Assessment and Plan:  84y old Female w/ pmhx of pancreatic CA dx last month on chemotherapy (gemcitabine and abraxane), a.fib on eliquis, DM, HTN, DLD, and colon CA s/p resection who presents 1 week duration of worsening, crampy, RUQ pain  with associated bilious vomiting, decreased PO intake, and chill, but no fevers. She was admitted to medicine with the primary diagnosis of Transaminitis 2/2 to obstruction by pancreatic mass. patient is s/p Placement of an 8F external-internal biliary drain on 12/20/19 by IR    #Transaminitis 2/2 to obstruction  - pancreatic cancer with elevated LFTs at outpt onc office  -Patient is s/p PTC w/ IR  biliary catheter placement, has external drain and s/p brush biopsy on 12/20/19. clinically has improved.  - LFTs improving.   -advance diet as tolerated     since  brush biopsy results- did not yield good specimen . planned for another brush biopsy either by IR or by [facility]         -CT abdomen showed 10.6 x 8.4 cm mass involving the 10.6 x 8.4 cm pancreatic head and body w/marked intrahepatic bile duct dilatation and dilatation of the common hepatic duct down to the level of the pancreatic mass    #) Acute urinary retention post d/c moreno, due to multiple episodes of urinary retention- reinserted moreno.       #) Suspected cholangitis in pt with pancreatic CA vs tumor related biliary obstruction   - SIRS criteria met on admission. No infection found. so NOT SEPSIS.  - Currently afebrile, vitals wnl  - blood cultures NGTD.   Per ID, ok to d/c abx.       #Atrial fibrillation  -c/w cardizem   -restarted on Eliquis    #Pancreatic cancer  -On gemcitabine and Abraxane  -s/p 1 cycle of chemo on 12/12  -Hem onc following,  Got PICC for chemo on 12/23.       #Hypertension  -c/w lisinopril, cardizem    #DM   -Monitor FS and start on insulin if FS > 200    #DLD  -c/w atorvastatin    #HYpokelamiea: Repleted. monitor.      #diet: full liquids  #DVT ppx: SCD   #GI ppx: none  #Dispo: Probable SNF in 24 hours.    #Progress Note Handoff: advance diet slowly as tolerated, reinserted moreno today, f/up IR for another brush biopsy ? tomorrow. hold tonight's dose of eliquis if brush biopsy will be planned for tomorrow.  Family discussion: yes Disposition: SNF in 48 hours

## 2023-07-20 NOTE — PROGRESS NOTE ADULT - ASSESSMENT
Patient seen and examined independently. I personally had a face-to-face encounter with the patient, examined the patient myself and reviewed the plan of care with the housestaff. Agree with Resident's note but my note supersedes the resident's note in matters hereby listed.     84y old Female w/ pmhx of pancreatic CA dx last month on chemotherapy (gemcitabine and abraxane), a.fib on eliquis, DM, HTN, DLD, and colon CA s/p resection who presents 1 week duration of worsening, crampy, RUQ pain  with associated bilious vomiting, decreased PO intake, and chill, but no fevers. She is currently admitted to medicine with the primary diagnosis of Transaminitis 2/2 to obstruction by pancreatic mass. patient is s/p Placement of an 8F external-internal biliary drain on 12/20/19 by IR    #Transaminitis 2/2 to obstruction  - pancreatic cancer with elevated LFTs at outpt onc office  -Patient is s/p PTC w/ IR  biliary catheter placement, has external drain and s/p brush biopsy on 12/20/19. subjectively better.  - LFTs improving.   Medically stable for d/c  But could not walk with PT. try again tomorrow and plan dispo to maybe STR?     Outpatient onc f/u after brush biopsy results. IF no more brush biopsy planned, IR will do internalization of drain outpatient.   Onc will decide outpatient if and when she'll be continuing chemo. Got a PICC line here.       -CT abdomen showed 10.6 x 8.4 cm mass involving the 10.6 x 8.4 cm pancreatic head and body w/marked intrahepatic bile duct dilatation and dilatation of the common hepatic duct down to the level of the pancreatic mass      #) Suspected cholangitis in pt with pancreatic CA vs tumor related biliary obstruction   - SIRS criteria met on admission. No infection found. so NOT SEPSIS.  - Currently afebrile, vitals wnl  - blood cultures NGTD.   Per ID, ok to d/c abx.       #Atrial fibrillation  -c/w cardizem 120mg TID PO  - change from Heparin gtt back to her eliquis on 12/23 evening.     #Pancreatic cancer  -On gemcitabine and Abraxane  -s/p 1 cycle of chemo on 12/12  -Hem onc following,  Got PICC for chemo on 12/23.       #Hypertension  -c/w lisinopril, cardizem    #DM   -Monitor FS and start on insulin if FS > 200    #DLD  -c/w atorvastatin    #HYpokelamiea: Repleted. monitor.    PATIENT DOES NOT FULFIL MALNUTRITION CRITERIA    #diet: full liquids  #DVT ppx: SCD   #GI ppx: none  #Dispo: Probable SNF soon. Recall PT. High Dose Vitamin A Counseling: Side effects reviewed, pt to contact office should one occur.

## 2023-10-01 PROBLEM — K86.89 PANCREATIC MASS: Status: RESOLVED | Noted: 2019-11-02 | Resolved: 2023-10-01

## 2023-10-01 PROBLEM — K86.89 PANCREATIC MASS: Status: RESOLVED | Noted: 2019-10-20 | Resolved: 2023-10-01

## 2024-05-24 NOTE — PROCEDURE NOTE - NSPOSTPRCRAD_GEN_A_CORE
Called patient and scheduled appointment for cough evaluation.  
Please inform - need assessment for cough -   
line in appropriate postion/line adjusted to depth of insertion/fluoroscopy/chest radiograph/depth of insertion

## 2024-06-11 NOTE — ED PROVIDER NOTE - ADMIT DISPOSITION PRESENT ON ADMISSION SEPSIS
(25 minutes):    Exercises per grid below to improve mobility, strength, and balance.  Required minimal verbal cues to promote proper body alignment.  Progressed repetitions as indicated.   Date:  6/3/2024 Date:  6/11/2024 Date:     Activity/Exercise Parameters Parameters Parameters   Toe scrunches 20 reps 20 reps    Left ankle-plantarflexion, dorsiflexion, eversion, and inversion Yellow theraband Red Theraband  2x10 reps    Nustep  Level 5 x 6 minutes    Ankle pumps  20 reps    Ankle inversion/eversion  20 reps                  MANUAL THERAPY: (20 minutes):   Soft tissue mobilization and densensitization  was utilized and necessary because of the patient's painful spasm and hypersensitivity .   Trigger point release along left gastroc.  Skin intact after treatment.       Treatment/Session Summary:    Treatment Assessment:   Patient has less swelling in left lower extremity.  Patient progressing well with therapy.     Communication/Consultation:  None today  Equipment provided today:  HEP and Theraband  Recommendations/Intent for next treatment session: Next visit will focus on range of motion, strengthening, manual therapy, and modalities as needed.    >Total Treatment Billable Duration:  45 minutes   Time In: 1015  Time Out: 1100    MOLLY HOPE PT         Charge Capture  Events  Meludia Portal  Appt Desk  Attendance Report     Future Appointments   Date Time Provider Department Center   6/14/2024 10:15 AM Molly Hope, PT [facility]   6/18/2024 10:15 AM Molly Hope, PT [facility]   6/21/2024 10:15 AM Molly Hope, PT [facility]   7/8/2024 10:30 AM Molly Hope, PT [facility]   7/11/2024 10:30 AM Molly Hope, PT [facility]   7/15/2024 10:40 AM Melissa Gomez MD [id number]   7/16/2024 10:30 AM Molly Hope, PT [facility]   7/18/2024 10:30 AM Molly Hope, PT [facility]   7/30/2024 10:30 AM Molly Hope, PT [facility]   8/1/2024 10:30 AM Molly Hope, PT  No

## 2024-10-09 NOTE — ASU PREOP CHECKLIST - SITE MARKED BY ANESTHESIOLOGIST
Render Post-Care Instructions In Note?: yes Post-Care Instructions: I reviewed with the patient in detail post-care instructions. Patient is to wear sunprotection, and avoid picking at any of the treated lesions. Pt may apply Vaseline to crusted or scabbing areas. Duration Of Freeze Thaw-Cycle (Seconds): 3 Render Note In Bullet Format When Appropriate: No Consent: The patient's consent was obtained including but not limited to risks of crusting, scabbing, blistering, scarring, darker or lighter pigmentary change, recurrence, incomplete removal and infection. Detail Level: Detailed Number Of Freeze-Thaw Cycles: 1 freeze-thaw cycle n/a

## 2024-11-14 NOTE — H&P ADULT - NSHPPOADEEPVENOUSTHROMB_GEN_A_CORE
November 14, 2024     Patient: Luisa Angeles   YOB: 2007   Date of Visit: 11/14/2024       To Whom it May Concern:    Luisa Angeles was seen in my clinic on 11/14/2024.     Recommend transfer to medical pod due to pain and difficulty with mobilization while fracture continues to heal  Physical therapy referral placed for quadriceps and hip abductor strengthening and hamstring stretching as well as gait training  Naproxen 500 mg twice daily scheduled for atrial prophylaxis as well as for pain control.  Patient instructed to take with food.    If you have any questions or concerns, please don't hesitate to call.         Sincerely,          Anjana Hidalgo MD        CC: No Recipients   no